# Patient Record
Sex: MALE | Race: WHITE | NOT HISPANIC OR LATINO | Employment: OTHER | URBAN - METROPOLITAN AREA
[De-identification: names, ages, dates, MRNs, and addresses within clinical notes are randomized per-mention and may not be internally consistent; named-entity substitution may affect disease eponyms.]

---

## 2017-03-31 ENCOUNTER — HOSPITAL ENCOUNTER (OUTPATIENT)
Dept: PREADMISSION TESTING | Facility: HOSPITAL | Age: 67
Discharge: HOME | End: 2017-03-31
Attending: ORTHOPAEDIC SURGERY | Admitting: ORTHOPAEDIC SURGERY

## 2017-03-31 LAB
ANION GAP SERPL CALC-SCNC: 14.9 MMOL/L (ref 10–20)
BASOPHILS # BLD AUTO: 0.1 10*3/UL (ref 0–0.2)
BASOPHILS NFR BLD AUTO: 1 % (ref 0–2)
BUN SERPL-MCNC: 19 MG/DL (ref 8–20)
BUN/CREAT SERPL: 17.3 (ref 6.2–20.3)
CALCIUM SERPL-MCNC: 9.6 MG/DL (ref 8.9–10.3)
CHLORIDE SERPL-SCNC: 106 MMOL/L (ref 101–111)
CONV CO2: 25 MMOL/L (ref 22–32)
CREAT UR-MCNC: 1.1 MG/DL (ref 0.7–1.2)
DIFFERENTIAL METHOD BLD: (no result)
EOSINOPHIL # BLD AUTO: 0.1 10*3/UL (ref 0–0.3)
EOSINOPHIL # BLD AUTO: 1 % (ref 0–3)
ERYTHROCYTE [DISTWIDTH] IN BLOOD BY AUTOMATED COUNT: 13 % (ref 11.5–14.5)
GLUCOSE SERPL-MCNC: 95 MG/DL (ref 65–99)
HCT VFR BLD AUTO: 39.7 % (ref 40–54)
HGB BLD-MCNC: 13.7 G/DL (ref 14–18)
LYMPHOCYTES # BLD AUTO: 1.5 10*3/UL (ref 0.8–4.8)
LYMPHOCYTES NFR BLD AUTO: 23 % (ref 18–42)
MCH RBC QN AUTO: 32.5 PG (ref 26–32)
MCHC RBC AUTO-ENTMCNC: 34.6 G/DL (ref 32–36)
MCV RBC AUTO: 94.1 FL (ref 80–94)
MONOCYTES # BLD AUTO: 0.4 10*3/UL (ref 0.1–1.3)
MONOCYTES NFR BLD AUTO: 6 % (ref 2–11)
NEUTROPHILS # BLD AUTO: 4.6 10*3/UL (ref 2.3–8.6)
NEUTROPHILS NFR BLD AUTO: 69 % (ref 50–75)
NRBC BLD AUTO-RTO: 0 /100{WBCS}
NRBC/RBC NFR BLD MANUAL: 0 10*3/UL
PLATELET # BLD AUTO: 161 10*3/UL (ref 150–450)
PMV BLD AUTO: 8 FL (ref 7.4–10.4)
POTASSIUM SERPL-SCNC: 4.9 MMOL/L (ref 3.6–5.1)
RBC # BLD AUTO: 4.22 10*6/UL (ref 4.6–6)
SODIUM SERPL-SCNC: 141 MMOL/L (ref 136–144)
WBC # BLD AUTO: 6.6 10*3/UL (ref 4.5–11.5)

## 2017-05-04 ENCOUNTER — HOSPITAL ENCOUNTER (OUTPATIENT)
Dept: PREADMISSION TESTING | Facility: HOSPITAL | Age: 67
Discharge: HOME | End: 2017-05-04
Attending: ORTHOPAEDIC SURGERY | Admitting: ORTHOPAEDIC SURGERY

## 2017-05-04 LAB
ANION GAP SERPL CALC-SCNC: 13.3 MMOL/L (ref 10–20)
BASOPHILS # BLD AUTO: 0 10*3/UL (ref 0–0.2)
BASOPHILS NFR BLD AUTO: 1 % (ref 0–2)
BUN SERPL-MCNC: 22 MG/DL (ref 8–20)
BUN/CREAT SERPL: 20 (ref 6.2–20.3)
CALCIUM SERPL-MCNC: 9.5 MG/DL (ref 8.9–10.3)
CHLORIDE SERPL-SCNC: 108 MMOL/L (ref 101–111)
CONV CO2: 23 MMOL/L (ref 22–32)
CREAT UR-MCNC: 1.1 MG/DL (ref 0.7–1.2)
DIFFERENTIAL METHOD BLD: (no result)
EOSINOPHIL # BLD AUTO: 0.1 10*3/UL (ref 0–0.3)
EOSINOPHIL # BLD AUTO: 2 % (ref 0–3)
ERYTHROCYTE [DISTWIDTH] IN BLOOD BY AUTOMATED COUNT: 12.8 % (ref 11.5–14.5)
GLUCOSE SERPL-MCNC: 96 MG/DL (ref 65–99)
HCT VFR BLD AUTO: 40.2 % (ref 40–54)
HGB BLD-MCNC: 13.7 G/DL (ref 14–18)
LYMPHOCYTES # BLD AUTO: 1.6 10*3/UL (ref 0.8–4.8)
LYMPHOCYTES NFR BLD AUTO: 27 % (ref 18–42)
MCH RBC QN AUTO: 32 PG (ref 26–32)
MCHC RBC AUTO-ENTMCNC: 34 G/DL (ref 32–36)
MCV RBC AUTO: 94 FL (ref 80–94)
MONOCYTES # BLD AUTO: 0.5 10*3/UL (ref 0.1–1.3)
MONOCYTES NFR BLD AUTO: 8 % (ref 2–11)
NEUTROPHILS # BLD AUTO: 3.7 10*3/UL (ref 2.3–8.6)
NEUTROPHILS NFR BLD AUTO: 62 % (ref 50–75)
NRBC BLD AUTO-RTO: 0 /100{WBCS}
NRBC/RBC NFR BLD MANUAL: 0 10*3/UL
PLATELET # BLD AUTO: 185 10*3/UL (ref 150–450)
PMV BLD AUTO: 7.7 FL (ref 7.4–10.4)
POTASSIUM SERPL-SCNC: 4.3 MMOL/L (ref 3.6–5.1)
RBC # BLD AUTO: 4.28 10*6/UL (ref 4.6–6)
SODIUM SERPL-SCNC: 140 MMOL/L (ref 136–144)
WBC # BLD AUTO: 5.9 10*3/UL (ref 4.5–11.5)

## 2017-05-12 ENCOUNTER — HOSPITAL ENCOUNTER (OUTPATIENT)
Dept: OTHER | Facility: HOSPITAL | Age: 67
Setting detail: SPECIMEN
Discharge: HOME | End: 2017-05-12
Attending: ORTHOPAEDIC SURGERY | Admitting: ORTHOPAEDIC SURGERY

## 2017-06-19 ENCOUNTER — HOSPITAL ENCOUNTER (OUTPATIENT)
Dept: FAMILY MEDICINE CLINIC | Facility: CLINIC | Age: 67
Setting detail: SPECIMEN
Discharge: HOME | End: 2017-06-19
Attending: FAMILY MEDICINE | Admitting: FAMILY MEDICINE

## 2017-07-03 ENCOUNTER — HOSPITAL ENCOUNTER (OUTPATIENT)
Dept: LAB | Facility: HOSPITAL | Age: 67
Discharge: HOME | End: 2017-07-03
Attending: FAMILY MEDICINE | Admitting: FAMILY MEDICINE

## 2017-07-03 LAB
CHOLEST SERPL-MCNC: 212 MG/DL
CHOLEST/HDLC SERPL: 3.9 {RATIO}
CONV LDL CHOLESTEROL DIRECT: 134 MG/DL (ref 0–100)
HDLC SERPL-MCNC: 54 MG/DL
LDLC/HDLC SERPL: 2.5 {RATIO}
LIPID INTERPRETATION: ABNORMAL
TRIGL SERPL-MCNC: 97 MG/DL
TSH SERPL-ACNC: 0.93 UIU/ML (ref 0.34–5.6)
VLDLC SERPL CALC-MCNC: 23.8 MG/DL

## 2017-10-18 ENCOUNTER — HOSPITAL ENCOUNTER (OUTPATIENT)
Dept: MRI IMAGING | Facility: HOSPITAL | Age: 67
Discharge: HOME | End: 2017-10-18
Attending: ORTHOPAEDIC SURGERY | Admitting: ORTHOPAEDIC SURGERY

## 2018-06-25 ENCOUNTER — HOSPITAL ENCOUNTER (OUTPATIENT)
Dept: FAMILY MEDICINE CLINIC | Facility: CLINIC | Age: 68
Setting detail: SPECIMEN
Discharge: HOME | End: 2018-06-25
Attending: FAMILY MEDICINE | Admitting: FAMILY MEDICINE

## 2018-06-25 LAB
CHOLEST SERPL-MCNC: 261 MG/DL
CHOLEST/HDLC SERPL: 4 {RATIO}
CONV LDL CHOLESTEROL DIRECT: 173 MG/DL (ref 0–100)
HDLC SERPL-MCNC: 65 MG/DL
LDLC/HDLC SERPL: 2.7 {RATIO}
LIPID INTERPRETATION: ABNORMAL
TRIGL SERPL-MCNC: 68 MG/DL
VLDLC SERPL CALC-MCNC: 22.8 MG/DL

## 2018-07-26 ENCOUNTER — HOSPITAL ENCOUNTER (OUTPATIENT)
Dept: OTHER | Facility: HOSPITAL | Age: 68
Discharge: HOME | End: 2018-07-26
Attending: ORTHOPAEDIC SURGERY | Admitting: ORTHOPAEDIC SURGERY

## 2018-07-26 LAB
ANION GAP SERPL CALC-SCNC: 9.1 MMOL/L (ref 10–20)
BASOPHILS # BLD AUTO: 0.1 10*3/UL (ref 0–0.2)
BASOPHILS NFR BLD AUTO: 1 % (ref 0–2)
BUN SERPL-MCNC: 15 MG/DL (ref 8–20)
BUN/CREAT SERPL: 12.5 (ref 6.2–20.3)
CALCIUM SERPL-MCNC: 9.5 MG/DL (ref 8.9–10.3)
CHLORIDE SERPL-SCNC: 107 MMOL/L (ref 101–111)
CONV CO2: 28 MMOL/L (ref 22–32)
CREAT UR-MCNC: 1.2 MG/DL (ref 0.7–1.2)
DIFFERENTIAL METHOD BLD: (no result)
EOSINOPHIL # BLD AUTO: 0.1 10*3/UL (ref 0–0.3)
EOSINOPHIL # BLD AUTO: 3 % (ref 0–3)
ERYTHROCYTE [DISTWIDTH] IN BLOOD BY AUTOMATED COUNT: 12.8 % (ref 11.5–14.5)
GLUCOSE SERPL-MCNC: 90 MG/DL (ref 65–99)
HCT VFR BLD AUTO: 40 % (ref 40–54)
HGB BLD-MCNC: 13.6 G/DL (ref 14–18)
LYMPHOCYTES # BLD AUTO: 1.3 10*3/UL (ref 0.8–4.8)
LYMPHOCYTES NFR BLD AUTO: 25 % (ref 18–42)
MCH RBC QN AUTO: 32.1 PG (ref 26–32)
MCHC RBC AUTO-ENTMCNC: 34.1 G/DL (ref 32–36)
MCV RBC AUTO: 94 FL (ref 80–94)
MONOCYTES # BLD AUTO: 0.4 10*3/UL (ref 0.1–1.3)
MONOCYTES NFR BLD AUTO: 8 % (ref 2–11)
NEUTROPHILS # BLD AUTO: 3.2 10*3/UL (ref 2.3–8.6)
NEUTROPHILS NFR BLD AUTO: 63 % (ref 50–75)
NRBC BLD AUTO-RTO: 0 /100{WBCS}
NRBC/RBC NFR BLD MANUAL: 0 10*3/UL
PLATELET # BLD AUTO: 201 10*3/UL (ref 150–450)
PMV BLD AUTO: 7.4 FL (ref 7.4–10.4)
POTASSIUM SERPL-SCNC: 4.1 MMOL/L (ref 3.6–5.1)
RBC # BLD AUTO: 4.26 10*6/UL (ref 4.6–6)
SODIUM SERPL-SCNC: 140 MMOL/L (ref 136–144)
WBC # BLD AUTO: 5.1 10*3/UL (ref 4.5–11.5)

## 2018-12-27 ENCOUNTER — HOSPITAL ENCOUNTER (OUTPATIENT)
Dept: MRI IMAGING | Facility: HOSPITAL | Age: 68
Discharge: HOME | End: 2018-12-27
Attending: NEUROLOGICAL SURGERY | Admitting: NEUROLOGICAL SURGERY

## 2019-06-10 ENCOUNTER — HOSPITAL ENCOUNTER (OUTPATIENT)
Dept: FAMILY MEDICINE CLINIC | Facility: CLINIC | Age: 69
Setting detail: SPECIMEN
Discharge: HOME | End: 2019-06-10
Attending: FAMILY MEDICINE | Admitting: FAMILY MEDICINE

## 2019-06-10 ENCOUNTER — CONVERSION ENCOUNTER (OUTPATIENT)
Dept: FAMILY MEDICINE CLINIC | Facility: CLINIC | Age: 69
End: 2019-06-10

## 2019-06-10 LAB
CHOLEST SERPL-MCNC: 218 MG/DL
CHOLEST/HDLC SERPL: 3.5 {RATIO}
CONV LDL CHOLESTEROL DIRECT: 147 MG/DL (ref 0–100)
HDLC SERPL-MCNC: 62 MG/DL
LDLC/HDLC SERPL: 2.4 {RATIO}
LIPID INTERPRETATION: ABNORMAL
TRIGL SERPL-MCNC: 58 MG/DL
TSH SERPL-ACNC: 1.21 UIU/ML (ref 0.34–5.6)
VLDLC SERPL CALC-MCNC: 8.9 MG/DL

## 2019-06-12 VITALS
OXYGEN SATURATION: 96 % | BODY MASS INDEX: 31.1 KG/M2 | RESPIRATION RATE: 16 BRPM | WEIGHT: 229.6 LBS | HEART RATE: 66 BPM | SYSTOLIC BLOOD PRESSURE: 128 MMHG | DIASTOLIC BLOOD PRESSURE: 79 MMHG | HEIGHT: 72 IN
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
## 2019-06-24 PROBLEM — M25.519 ACROMIOCLAVICULAR JOINT PAIN: Status: ACTIVE | Noted: 2018-06-25

## 2019-06-24 PROBLEM — M65.819 OTHER SYNOVITIS AND TENOSYNOVITIS, UNSPECIFIED SHOULDER: Status: ACTIVE | Noted: 2017-12-18

## 2019-06-24 PROBLEM — D23.9 DERMATOFIBROMA: Status: ACTIVE | Noted: 2019-06-10

## 2019-06-24 PROBLEM — M54.12 CERVICAL RADICULITIS: Status: ACTIVE | Noted: 2018-12-17

## 2019-06-24 PROBLEM — M75.01 ADHESIVE CAPSULITIS OF RIGHT SHOULDER: Status: ACTIVE | Noted: 2018-04-16

## 2019-06-24 RX ORDER — SILDENAFIL 100 MG/1
TABLET, FILM COATED ORAL
Refills: 5 | COMMUNITY
Start: 2019-03-18 | End: 2020-02-04

## 2019-06-24 RX ORDER — BUDESONIDE AND FORMOTEROL FUMARATE DIHYDRATE 160; 4.5 UG/1; UG/1
2 AEROSOL RESPIRATORY (INHALATION) 2 TIMES DAILY
Refills: 3 | COMMUNITY
Start: 2019-05-03 | End: 2019-07-18 | Stop reason: SDUPTHER

## 2019-06-24 RX ORDER — MONTELUKAST SODIUM 10 MG/1
10 TABLET ORAL DAILY
Refills: 2 | COMMUNITY
Start: 2019-05-03 | End: 2019-11-30 | Stop reason: SDUPTHER

## 2019-06-24 RX ORDER — NAPROXEN 500 MG/1
TABLET ORAL EVERY 12 HOURS
COMMUNITY
Start: 2018-06-25 | End: 2019-09-25 | Stop reason: SDUPTHER

## 2019-06-24 RX ORDER — ALBUTEROL SULFATE 90 UG/1
AEROSOL, METERED RESPIRATORY (INHALATION)
COMMUNITY
Start: 2012-06-12 | End: 2021-03-12 | Stop reason: SDUPTHER

## 2019-06-25 ENCOUNTER — OFFICE VISIT (OUTPATIENT)
Dept: SURGERY | Facility: CLINIC | Age: 69
End: 2019-06-25

## 2019-06-25 VITALS
OXYGEN SATURATION: 98 % | DIASTOLIC BLOOD PRESSURE: 82 MMHG | SYSTOLIC BLOOD PRESSURE: 130 MMHG | HEART RATE: 66 BPM | TEMPERATURE: 97.8 F | HEIGHT: 72 IN | BODY MASS INDEX: 30.23 KG/M2 | WEIGHT: 223.2 LBS

## 2019-06-25 DIAGNOSIS — L98.9 DISORDER OF SKIN OR SUBCUTANEOUS TISSUE: Primary | ICD-10-CM

## 2019-06-25 PROCEDURE — 99202 OFFICE O/P NEW SF 15 MIN: CPT | Performed by: SURGERY

## 2019-06-25 RX ORDER — SODIUM CHLORIDE 9 MG/ML
100 INJECTION, SOLUTION INTRAVENOUS CONTINUOUS
Status: CANCELLED | OUTPATIENT
Start: 2019-06-25

## 2019-06-25 NOTE — PROGRESS NOTES
"Subjective   Jim Malik is a 69 y.o. male.   Chief Complaint   Patient presents with   • Skin Lesion     scalp and left lower extremity     /82 (BP Location: Right arm, Patient Position: Sitting, Cuff Size: Small Adult)   Pulse 66   Temp 97.8 °F (36.6 °C) (Oral)   Ht 182.9 cm (72\")   Wt 101 kg (223 lb 3.2 oz)   SpO2 98%   BMI 30.27 kg/m²      HISTORY OF PRESENT ILLNESS:  69-year-old gentleman who has been referred to me from his primary care provider to evaluate and remove several skin lesions.  He said that on his left lower extremity he had a 2-week period where a new mole developed it turned red gradually improved but now there is a new skin lesion where there was none before.  He says this is never occurred previously.  He has no known history of skin cancer.  He was also found to have 2 spots on his posterior scalp which were suspicious for skin cancer.  Because of these reasons he has been referred to me.  He denies any symptoms at this location.      Outpatient Encounter Medications as of 6/25/2019   Medication Sig Dispense Refill   • montelukast (SINGULAIR) 10 MG tablet Take 10 mg by mouth Daily.  2   • SYMBICORT 160-4.5 MCG/ACT inhaler Inhale 2 puffs 2 (Two) Times a Day.  3   • albuterol sulfate HFA (PROVENTIL HFA) 108 (90 Base) MCG/ACT inhaler PROVENTIL  (90 Base) MCG/ACT AERS     • naproxen (NAPROSYN) 500 MG tablet Every 12 (Twelve) Hours.     • sildenafil (VIAGRA) 100 MG tablet TAKE 1/2 - 1 TABLET BY MOUTH 1 HOUR PRIOR TO INTERCOURSE  5     No facility-administered encounter medications on file as of 6/25/2019.          The following portions of the patient's history were reviewed and updated as appropriate: allergies, current medications, past family history, past medical history, past social history, past surgical history and problem list.    A complete review of systems has been obtained is positive for changes in moles but otherwise negative  Objective   Physical Exam "   Constitutional: He is oriented to person, place, and time. He appears well-developed and well-nourished.   HENT:   Head: Normocephalic and atraumatic.   Eyes: Conjunctivae and EOM are normal.   Neck: No tracheal deviation present.   Cardiovascular: Normal rate and regular rhythm.   Pulmonary/Chest: Effort normal and breath sounds normal. No stridor.   Abdominal: Soft. He exhibits no distension. There is no tenderness. There is no rebound and no guarding.   Musculoskeletal: He exhibits no edema or tenderness.   Lymphadenopathy:     He has no cervical adenopathy.   Neurological: He is oriented to person, place, and time. No cranial nerve deficit.   Skin: Skin is warm and dry.   On the superior posterior scalp there are 2 skin lesions approximately 5 mm in greatest dimension which appear slightly ulcerated.  On the left lower extremity there is about a 7 mm hyperpigmented skin lesion which he says is new and started out as a red raised spot.   Psychiatric: He has a normal mood and affect. His behavior is normal.         Assessment/Plan   Jim was seen today for skin lesion.    Diagnoses and all orders for this visit:    Disorder of skin or subcutaneous tissue  -     Case Request; Standing  -     sodium chloride 0.9 % infusion  -     ceFAZolin (ANCEF) 2 g in sodium chloride 0.9 % 100 mL IVPB  -     Case Request    Other orders  -     Follow Anesthesia Guidelines / Standing Orders; Future  -     Provide NPO Instructions to Patient; Future  -     Follow Anesthesia Guidelines / Standing Orders; Standing  -     Verify NPO Status; Standing  -     Obtain Informed Consent; Standing  -     Clip Operative Site; Standing  -     Have Patient Void Prior to Procedure; Standing  -     Instructions on Coughing, Deep Breathing & Incentive Spirometry; Standing  -     Oxygen Therapy- Nasal Cannula; Titrate for SPO2: 90% - 95%; Standing  -     Notify Physician - Standard; Standing      Patient referred for evaluation of several skin  lesions.  He was trying to see dermatology for removal but he could not see them for 4 months.  I discussed with him the risks and benefits of surgical removal in the operating room including the 2 posterior scalp lesions in the left lower extremity lesion. after this discussion he does understand and wishes to proceed.    Stephen Zamora MD  6/25/2019  8:43 AM

## 2019-06-26 ENCOUNTER — APPOINTMENT (OUTPATIENT)
Dept: PREADMISSION TESTING | Facility: HOSPITAL | Age: 69
End: 2019-06-26

## 2019-06-26 VITALS
BODY MASS INDEX: 31.48 KG/M2 | HEART RATE: 87 BPM | DIASTOLIC BLOOD PRESSURE: 77 MMHG | HEIGHT: 72 IN | OXYGEN SATURATION: 95 % | WEIGHT: 232.38 LBS | SYSTOLIC BLOOD PRESSURE: 120 MMHG

## 2019-06-26 LAB
BASOPHILS # BLD AUTO: 0.1 10*3/MM3 (ref 0–0.2)
BASOPHILS NFR BLD AUTO: 1 % (ref 0–1.5)
DEPRECATED RDW RBC AUTO: 42.9 FL (ref 37–54)
EOSINOPHIL # BLD AUTO: 0.1 10*3/MM3 (ref 0–0.4)
EOSINOPHIL NFR BLD AUTO: 2.1 % (ref 0.3–6.2)
ERYTHROCYTE [DISTWIDTH] IN BLOOD BY AUTOMATED COUNT: 12.8 % (ref 12.3–15.4)
HCT VFR BLD AUTO: 40.5 % (ref 37.5–51)
HGB BLD-MCNC: 13.8 G/DL (ref 13–17.7)
LYMPHOCYTES # BLD AUTO: 1.7 10*3/MM3 (ref 0.7–3.1)
LYMPHOCYTES NFR BLD AUTO: 29.8 % (ref 19.6–45.3)
MCH RBC QN AUTO: 32.8 PG (ref 26.6–33)
MCHC RBC AUTO-ENTMCNC: 34.1 G/DL (ref 31.5–35.7)
MCV RBC AUTO: 96.3 FL (ref 79–97)
MONOCYTES # BLD AUTO: 0.5 10*3/MM3 (ref 0.1–0.9)
MONOCYTES NFR BLD AUTO: 8.8 % (ref 5–12)
NEUTROPHILS # BLD AUTO: 3.2 10*3/MM3 (ref 1.7–7)
NEUTROPHILS NFR BLD AUTO: 58.3 % (ref 42.7–76)
NRBC BLD AUTO-RTO: 0.1 /100 WBC (ref 0–0.2)
PLATELET # BLD AUTO: 196 10*3/MM3 (ref 140–450)
PMV BLD AUTO: 7.8 FL (ref 6–12)
RBC # BLD AUTO: 4.21 10*6/MM3 (ref 4.14–5.8)
WBC NRBC COR # BLD: 5.6 10*3/MM3 (ref 3.4–10.8)

## 2019-06-26 PROCEDURE — 93010 ELECTROCARDIOGRAM REPORT: CPT | Performed by: INTERNAL MEDICINE

## 2019-06-26 PROCEDURE — 85025 COMPLETE CBC W/AUTO DIFF WBC: CPT | Performed by: SURGERY

## 2019-06-26 PROCEDURE — 93005 ELECTROCARDIOGRAM TRACING: CPT

## 2019-06-26 PROCEDURE — 36415 COLL VENOUS BLD VENIPUNCTURE: CPT

## 2019-07-08 ENCOUNTER — ANESTHESIA (OUTPATIENT)
Dept: PERIOP | Facility: HOSPITAL | Age: 69
End: 2019-07-08

## 2019-07-08 ENCOUNTER — ANESTHESIA EVENT (OUTPATIENT)
Dept: PERIOP | Facility: HOSPITAL | Age: 69
End: 2019-07-08

## 2019-07-08 ENCOUNTER — HOSPITAL ENCOUNTER (OUTPATIENT)
Facility: HOSPITAL | Age: 69
Setting detail: HOSPITAL OUTPATIENT SURGERY
Discharge: HOME | End: 2019-07-08
Attending: SURGERY | Admitting: SURGERY

## 2019-07-08 VITALS
BODY MASS INDEX: 30.52 KG/M2 | DIASTOLIC BLOOD PRESSURE: 89 MMHG | HEART RATE: 57 BPM | WEIGHT: 225.31 LBS | OXYGEN SATURATION: 100 % | RESPIRATION RATE: 18 BRPM | SYSTOLIC BLOOD PRESSURE: 139 MMHG | HEIGHT: 72 IN | TEMPERATURE: 97.5 F

## 2019-07-08 DIAGNOSIS — L98.9 DISORDER OF SKIN OR SUBCUTANEOUS TISSUE: ICD-10-CM

## 2019-07-08 PROCEDURE — 11402 EXC TR-EXT B9+MARG 1.1-2 CM: CPT | Performed by: SURGERY

## 2019-07-08 PROCEDURE — 25010000002 PROPOFOL 200 MG/20ML EMULSION: Performed by: ANESTHESIOLOGY

## 2019-07-08 PROCEDURE — 11421 EXC H-F-NK-SP B9+MARG 0.6-1: CPT | Performed by: SURGERY

## 2019-07-08 PROCEDURE — 25010000002 KETOROLAC TROMETHAMINE PER 15 MG: Performed by: ANESTHESIOLOGY

## 2019-07-08 PROCEDURE — 88305 TISSUE EXAM BY PATHOLOGIST: CPT | Performed by: SURGERY

## 2019-07-08 PROCEDURE — 94799 UNLISTED PULMONARY SVC/PX: CPT

## 2019-07-08 PROCEDURE — 25010000002 ONDANSETRON PER 1 MG: Performed by: ANESTHESIOLOGY

## 2019-07-08 PROCEDURE — 11621 EXC S/N/H/F/G MAL+MRG 0.6-1: CPT | Performed by: SURGERY

## 2019-07-08 PROCEDURE — 25010000002 PROPOFOL 500 MG/50ML EMULSION: Performed by: ANESTHESIOLOGY

## 2019-07-08 PROCEDURE — 25010000002 FENTANYL CITRATE (PF) 100 MCG/2ML SOLUTION: Performed by: ANESTHESIOLOGY

## 2019-07-08 RX ORDER — HYDROMORPHONE HCL 110MG/55ML
0.5 PATIENT CONTROLLED ANALGESIA SYRINGE INTRAVENOUS
Status: DISCONTINUED | OUTPATIENT
Start: 2019-07-08 | End: 2019-07-08 | Stop reason: HOSPADM

## 2019-07-08 RX ORDER — ACETAMINOPHEN 325 MG/1
650 TABLET ORAL ONCE AS NEEDED
Status: DISCONTINUED | OUTPATIENT
Start: 2019-07-08 | End: 2019-07-08 | Stop reason: HOSPADM

## 2019-07-08 RX ORDER — ONDANSETRON 2 MG/ML
4 INJECTION INTRAMUSCULAR; INTRAVENOUS ONCE AS NEEDED
Status: DISCONTINUED | OUTPATIENT
Start: 2019-07-08 | End: 2019-07-08 | Stop reason: HOSPADM

## 2019-07-08 RX ORDER — MIDAZOLAM HYDROCHLORIDE 1 MG/ML
2 INJECTION INTRAMUSCULAR; INTRAVENOUS
Status: DISCONTINUED | OUTPATIENT
Start: 2019-07-08 | End: 2019-07-08 | Stop reason: HOSPADM

## 2019-07-08 RX ORDER — ACETAMINOPHEN 650 MG/1
650 SUPPOSITORY RECTAL ONCE AS NEEDED
Status: DISCONTINUED | OUTPATIENT
Start: 2019-07-08 | End: 2019-07-08 | Stop reason: HOSPADM

## 2019-07-08 RX ORDER — EPHEDRINE SULFATE 50 MG/ML
5 INJECTION, SOLUTION INTRAVENOUS ONCE AS NEEDED
Status: DISCONTINUED | OUTPATIENT
Start: 2019-07-08 | End: 2019-07-08 | Stop reason: HOSPADM

## 2019-07-08 RX ORDER — LIDOCAINE HYDROCHLORIDE 10 MG/ML
0.5 INJECTION, SOLUTION EPIDURAL; INFILTRATION; INTRACAUDAL; PERINEURAL ONCE AS NEEDED
Status: DISCONTINUED | OUTPATIENT
Start: 2019-07-08 | End: 2019-07-08 | Stop reason: HOSPADM

## 2019-07-08 RX ORDER — NALOXONE HCL 0.4 MG/ML
0.2 VIAL (ML) INJECTION AS NEEDED
Status: DISCONTINUED | OUTPATIENT
Start: 2019-07-08 | End: 2019-07-08 | Stop reason: HOSPADM

## 2019-07-08 RX ORDER — HYDRALAZINE HYDROCHLORIDE 20 MG/ML
5 INJECTION INTRAMUSCULAR; INTRAVENOUS
Status: DISCONTINUED | OUTPATIENT
Start: 2019-07-08 | End: 2019-07-08 | Stop reason: HOSPADM

## 2019-07-08 RX ORDER — FENTANYL CITRATE 50 UG/ML
50 INJECTION, SOLUTION INTRAMUSCULAR; INTRAVENOUS
Status: DISCONTINUED | OUTPATIENT
Start: 2019-07-08 | End: 2019-07-08 | Stop reason: HOSPADM

## 2019-07-08 RX ORDER — PROPOFOL 10 MG/ML
INJECTION, EMULSION INTRAVENOUS CONTINUOUS PRN
Status: DISCONTINUED | OUTPATIENT
Start: 2019-07-08 | End: 2019-07-08 | Stop reason: SURG

## 2019-07-08 RX ORDER — DIPHENHYDRAMINE HCL 25 MG
25 TABLET ORAL
Status: DISCONTINUED | OUTPATIENT
Start: 2019-07-08 | End: 2019-07-08 | Stop reason: HOSPADM

## 2019-07-08 RX ORDER — SODIUM CHLORIDE 9 MG/ML
100 INJECTION, SOLUTION INTRAVENOUS CONTINUOUS
Status: DISCONTINUED | OUTPATIENT
Start: 2019-07-08 | End: 2019-07-08 | Stop reason: HOSPADM

## 2019-07-08 RX ORDER — LABETALOL HYDROCHLORIDE 5 MG/ML
5 INJECTION, SOLUTION INTRAVENOUS
Status: DISCONTINUED | OUTPATIENT
Start: 2019-07-08 | End: 2019-07-08 | Stop reason: HOSPADM

## 2019-07-08 RX ORDER — SODIUM CHLORIDE, SODIUM LACTATE, POTASSIUM CHLORIDE, CALCIUM CHLORIDE 600; 310; 30; 20 MG/100ML; MG/100ML; MG/100ML; MG/100ML
9 INJECTION, SOLUTION INTRAVENOUS CONTINUOUS
Status: DISCONTINUED | OUTPATIENT
Start: 2019-07-08 | End: 2019-07-08 | Stop reason: HOSPADM

## 2019-07-08 RX ORDER — SODIUM CHLORIDE 0.9 % (FLUSH) 0.9 %
1-10 SYRINGE (ML) INJECTION AS NEEDED
Status: DISCONTINUED | OUTPATIENT
Start: 2019-07-08 | End: 2019-07-08 | Stop reason: HOSPADM

## 2019-07-08 RX ORDER — PROMETHAZINE HYDROCHLORIDE 25 MG/1
25 TABLET ORAL ONCE AS NEEDED
Status: DISCONTINUED | OUTPATIENT
Start: 2019-07-08 | End: 2019-07-08 | Stop reason: HOSPADM

## 2019-07-08 RX ORDER — FLUMAZENIL 0.1 MG/ML
0.2 INJECTION INTRAVENOUS AS NEEDED
Status: DISCONTINUED | OUTPATIENT
Start: 2019-07-08 | End: 2019-07-08 | Stop reason: HOSPADM

## 2019-07-08 RX ORDER — FENTANYL CITRATE 50 UG/ML
INJECTION, SOLUTION INTRAMUSCULAR; INTRAVENOUS AS NEEDED
Status: DISCONTINUED | OUTPATIENT
Start: 2019-07-08 | End: 2019-07-08 | Stop reason: SURG

## 2019-07-08 RX ORDER — KETOROLAC TROMETHAMINE 15 MG/ML
INJECTION, SOLUTION INTRAMUSCULAR; INTRAVENOUS AS NEEDED
Status: DISCONTINUED | OUTPATIENT
Start: 2019-07-08 | End: 2019-07-08 | Stop reason: SURG

## 2019-07-08 RX ORDER — PROMETHAZINE HYDROCHLORIDE 25 MG/ML
12.5 INJECTION, SOLUTION INTRAMUSCULAR; INTRAVENOUS ONCE AS NEEDED
Status: DISCONTINUED | OUTPATIENT
Start: 2019-07-08 | End: 2019-07-08 | Stop reason: HOSPADM

## 2019-07-08 RX ORDER — PROMETHAZINE HYDROCHLORIDE 25 MG/1
25 SUPPOSITORY RECTAL ONCE AS NEEDED
Status: DISCONTINUED | OUTPATIENT
Start: 2019-07-08 | End: 2019-07-08 | Stop reason: HOSPADM

## 2019-07-08 RX ORDER — PROPOFOL 10 MG/ML
INJECTION, EMULSION INTRAVENOUS AS NEEDED
Status: DISCONTINUED | OUTPATIENT
Start: 2019-07-08 | End: 2019-07-08 | Stop reason: SURG

## 2019-07-08 RX ORDER — LIDOCAINE HYDROCHLORIDE AND EPINEPHRINE 10; 10 MG/ML; UG/ML
INJECTION, SOLUTION INFILTRATION; PERINEURAL AS NEEDED
Status: DISCONTINUED | OUTPATIENT
Start: 2019-07-08 | End: 2019-07-08 | Stop reason: HOSPADM

## 2019-07-08 RX ORDER — DIPHENHYDRAMINE HYDROCHLORIDE 50 MG/ML
12.5 INJECTION INTRAMUSCULAR; INTRAVENOUS
Status: DISCONTINUED | OUTPATIENT
Start: 2019-07-08 | End: 2019-07-08 | Stop reason: HOSPADM

## 2019-07-08 RX ORDER — ONDANSETRON 2 MG/ML
INJECTION INTRAMUSCULAR; INTRAVENOUS AS NEEDED
Status: DISCONTINUED | OUTPATIENT
Start: 2019-07-08 | End: 2019-07-08 | Stop reason: SURG

## 2019-07-08 RX ORDER — HYDROCODONE BITARTRATE AND ACETAMINOPHEN 5; 325 MG/1; MG/1
1 TABLET ORAL EVERY 4 HOURS PRN
Qty: 6 TABLET | Refills: 0 | Status: SHIPPED | OUTPATIENT
Start: 2019-07-08 | End: 2019-07-15 | Stop reason: HOSPADM

## 2019-07-08 RX ADMIN — PROPOFOL 50 MG: 10 INJECTION, EMULSION INTRAVENOUS at 11:10

## 2019-07-08 RX ADMIN — CEFAZOLIN SODIUM 2 G: 1 INJECTION, POWDER, FOR SOLUTION INTRAMUSCULAR; INTRAVENOUS at 11:10

## 2019-07-08 RX ADMIN — ONDANSETRON 4 MG: 2 INJECTION INTRAMUSCULAR; INTRAVENOUS at 11:25

## 2019-07-08 RX ADMIN — FENTANYL CITRATE 50 MCG: 50 INJECTION, SOLUTION INTRAMUSCULAR; INTRAVENOUS at 11:10

## 2019-07-08 RX ADMIN — SODIUM CHLORIDE, SODIUM LACTATE, POTASSIUM CHLORIDE, AND CALCIUM CHLORIDE 9 ML/HR: 600; 310; 30; 20 INJECTION, SOLUTION INTRAVENOUS at 09:24

## 2019-07-08 RX ADMIN — FENTANYL CITRATE 25 MCG: 50 INJECTION, SOLUTION INTRAMUSCULAR; INTRAVENOUS at 11:20

## 2019-07-08 RX ADMIN — FENTANYL CITRATE 25 MCG: 50 INJECTION, SOLUTION INTRAMUSCULAR; INTRAVENOUS at 11:15

## 2019-07-08 RX ADMIN — PROPOFOL 50 MCG/KG/MIN: 10 INJECTION, EMULSION INTRAVENOUS at 11:10

## 2019-07-08 RX ADMIN — KETOROLAC TROMETHAMINE 15 MG: 15 INJECTION, SOLUTION INTRAMUSCULAR; INTRAVENOUS at 11:25

## 2019-07-08 NOTE — ANESTHESIA PREPROCEDURE EVALUATION
Anesthesia Evaluation     Patient summary reviewed   NPO Solid Status: > 8 hours  NPO Liquid Status: > 8 hours           Airway   Mallampati: II  TM distance: >3 FB  Neck ROM: full  No difficulty expected  Dental      Pulmonary - normal exam   (+) asthma,   Cardiovascular - normal exam        Neuro/Psych  GI/Hepatic/Renal/Endo    (+) obesity,       Musculoskeletal     Abdominal   (+) obese,    Substance History      OB/GYN          Other        ROS/Med Hx Other: NPO  Recent labs reviewed  EKG NSR                  Anesthesia Plan    ASA 2     MAC     intravenous induction   Anesthetic plan, all risks, benefits, and alternatives have been provided, discussed and informed consent has been obtained with: patient.

## 2019-07-08 NOTE — OP NOTE
Operative Report:    Patient Name:  Jim Malik  YOB: 1950    Date of Surgery:  7/8/2019     Indications: 69-year-old gentleman referred to me for removal of several suspicious skin lesions after evaluation by his primary care provider.  We discussed the risks and benefits of removal he understood and was willing to proceed.    Pre-op Diagnosis:   Disorder of skin or subcutaneous tissue [L98.9]       Post-Op Diagnosis Codes:     * Disorder of skin or subcutaneous tissue [L98.9]    Procedure/CPT® Codes:      Procedure(s):  Excision of left lower extremity atypical nevus total excised diameter 17 mm  Excision of left posterior scalp lesion total excised diameter 9 mm  Excision of central posterior scalp lesion total excised diameter 7 mm    Staff:  Surgeon(s):  Stephen Zamora MD         Anesthesia: Sedation    Estimated Blood Loss: 10    Implants:    Nothing was implanted during the procedure    Specimen:          Specimens     ID Source Type Tests Collected By Collected At Frozen?      A Leg, Left Tissue · TISSUE PATHOLOGY EXAM   Stephen Zamora MD 7/8/19 1117 No     Description: skin lesion left lower leg    B Scalp Skin · TISSUE PATHOLOGY EXAM   Stephen Zamora MD 7/8/19 1129 No     Description: left posterior scalp lesion    C Scalp Skin · TISSUE PATHOLOGY EXAM   Stephen Zamora MD 7/8/19 1130 No     Description: posterior center scalp lesion              Findings: Left lower extremity atypical nevus 7 mm greatest dimension 17 mm total excised diameter.  Left posterior scalp lesion 5 mm greatest diameter 9 mm total excised diameter.  Central posterior scalp lesion 3 mm greatest dimension 7 mm total excised diameter    Complications: Patient reached up under the drapes and contaminated the skin during the operation.  the skin was re-cleansed using a chlorhexidine scrub.     Description of Procedure: Patient was taken to the operating room and placed on the table in the right lateral decubitus  position under monitored sedation.  His left lower extremity and posterior scalp were prepped and draped normal sterile fashion.  A timeout was performed identifying the correct patient procedure site of operation.  I began the operation by marking a border around the left lower extremity skin lesion to achieve a 5 mm margin.  The skin was infiltrated using 1% lidocaine with epinephrine and a elliptical incision was made with a 15 blade scalpel.  The specimen was removed and sent to pathology for review and the wound was closed using interrupted 3-0 Prolene sutures.  I then turned my attention to the scalp where the left posterior scalp lesion was 5 mm in greatest dimension.  This was infiltrated using 1% lidocaine with epinephrine and the 15 blade scalpel was used to excise this lesion with a 2 mm margin total excised diameter 9 mm.  I then turned my attention to the posterior central scalp where there was a broad area approximately 4 to 5 cm of scaling but no clearly defined skin lesion.  I then proceeded to perform a 7 mm wide incisional biopsy at the area that had the most scaling.  This was previously discussed with him as the plan.  The specimens were sent to pathology for review.   I then closed the skin of the scalp wounds using interrupted 3-0 Prolene sutures with good hemostasis.  Covaderm dressings were placed on the wounds.       Date: 7/8/2019  Time: 11:58 AM

## 2019-07-08 NOTE — ANESTHESIA POSTPROCEDURE EVALUATION
Patient: Jim Malik    Procedure Summary     Date:  07/08/19 Room / Location:  [facility] OR 13 / [facility] MAIN OR    Anesthesia Start:  1102 Anesthesia Stop:  1156    Procedure:  SKIN LESION EXCISION  Posterior scalp x2 and left lower extremity (N/A ) Diagnosis:       Disorder of skin or subcutaneous tissue      (Disorder of skin or subcutaneous tissue [L98.9])    Surgeon:  Stephen Zamora MD Provider:  Clifford Hernandez MD    Anesthesia Type:  MAC ASA Status:  2          Anesthesia Type: MAC  Last vitals  BP   139/89 (07/08/19 1224)   Temp   97.5 °F (36.4 °C) (07/08/19 1224)   Pulse   57 (07/08/19 1224)   Resp   18 (07/08/19 1224)     SpO2   100 % (07/08/19 1224)     Post Anesthesia Care and Evaluation    Patient location during evaluation: PHASE II  Patient participation: complete - patient participated  Level of consciousness: awake  Pain scale: See nurse's notes for pain score.  Pain management: adequate  Airway patency: patent  Anesthetic complications: No anesthetic complications  PONV Status: none  Cardiovascular status: acceptable  Respiratory status: acceptable  Hydration status: acceptable    Comments: Patient seen and examined postoperatively; vital signs stable; SpO2 greater than or equal to 90%; cardiopulmonary status stable; nausea/vomiting adequately controlled; pain adequately controlled; no apparent anesthesia complications; patient discharged from anesthesia care when discharge criteria were met

## 2019-07-09 LAB
LAB AP CASE REPORT: NORMAL
LAB AP DIAGNOSIS COMMENT: NORMAL
PATH REPORT.FINAL DX SPEC: NORMAL
PATH REPORT.GROSS SPEC: NORMAL

## 2019-07-10 ENCOUNTER — PREP FOR SURGERY (OUTPATIENT)
Dept: OTHER | Facility: HOSPITAL | Age: 69
End: 2019-07-10

## 2019-07-10 DIAGNOSIS — C44.41 BASAL CELL CARCINOMA, SCALP/NECK: Primary | ICD-10-CM

## 2019-07-10 RX ORDER — SODIUM CHLORIDE 0.9 % (FLUSH) 0.9 %
3-10 SYRINGE (ML) INJECTION AS NEEDED
Status: CANCELLED | OUTPATIENT
Start: 2019-07-10

## 2019-07-10 RX ORDER — SODIUM CHLORIDE 9 MG/ML
100 INJECTION, SOLUTION INTRAVENOUS CONTINUOUS
Status: CANCELLED | OUTPATIENT
Start: 2019-07-10

## 2019-07-10 RX ORDER — SODIUM CHLORIDE 0.9 % (FLUSH) 0.9 %
3 SYRINGE (ML) INJECTION EVERY 12 HOURS SCHEDULED
Status: CANCELLED | OUTPATIENT
Start: 2019-07-10

## 2019-07-11 PROBLEM — C44.41 BASAL CELL CARCINOMA, SCALP/NECK: Status: ACTIVE | Noted: 2019-07-11

## 2019-07-12 ENCOUNTER — ANESTHESIA EVENT (OUTPATIENT)
Dept: PERIOP | Facility: HOSPITAL | Age: 69
End: 2019-07-12

## 2019-07-12 ENCOUNTER — APPOINTMENT (OUTPATIENT)
Dept: PREADMISSION TESTING | Facility: HOSPITAL | Age: 69
End: 2019-07-12

## 2019-07-15 ENCOUNTER — ANESTHESIA (OUTPATIENT)
Dept: PERIOP | Facility: HOSPITAL | Age: 69
End: 2019-07-15

## 2019-07-15 ENCOUNTER — HOSPITAL ENCOUNTER (OUTPATIENT)
Facility: HOSPITAL | Age: 69
Setting detail: HOSPITAL OUTPATIENT SURGERY
Discharge: HOME | End: 2019-07-15
Attending: SURGERY | Admitting: SURGERY

## 2019-07-15 VITALS
RESPIRATION RATE: 18 BRPM | DIASTOLIC BLOOD PRESSURE: 88 MMHG | OXYGEN SATURATION: 96 % | SYSTOLIC BLOOD PRESSURE: 138 MMHG | HEART RATE: 71 BPM | TEMPERATURE: 97.5 F

## 2019-07-15 DIAGNOSIS — C44.41 BASAL CELL CARCINOMA, SCALP/NECK: ICD-10-CM

## 2019-07-15 PROCEDURE — 11624 EXC S/N/H/F/G MAL+MRG 3.1-4: CPT | Performed by: SURGERY

## 2019-07-15 PROCEDURE — 88331 PATH CONSLTJ SURG 1 BLK 1SPC: CPT | Performed by: SURGERY

## 2019-07-15 PROCEDURE — 25010000002 PROPOFOL 200 MG/20ML EMULSION: Performed by: ANESTHESIOLOGIST ASSISTANT

## 2019-07-15 PROCEDURE — 25010000002 DEXAMETHASONE PER 1 MG: Performed by: ANESTHESIOLOGIST ASSISTANT

## 2019-07-15 PROCEDURE — 88305 TISSUE EXAM BY PATHOLOGIST: CPT | Performed by: SURGERY

## 2019-07-15 PROCEDURE — 25010000002 KETOROLAC TROMETHAMINE PER 15 MG: Performed by: ANESTHESIOLOGIST ASSISTANT

## 2019-07-15 PROCEDURE — 25010000002 FENTANYL CITRATE (PF) 100 MCG/2ML SOLUTION: Performed by: ANESTHESIOLOGIST ASSISTANT

## 2019-07-15 PROCEDURE — 25010000002 ONDANSETRON PER 1 MG: Performed by: ANESTHESIOLOGIST ASSISTANT

## 2019-07-15 RX ORDER — PROPOFOL 10 MG/ML
INJECTION, EMULSION INTRAVENOUS AS NEEDED
Status: DISCONTINUED | OUTPATIENT
Start: 2019-07-15 | End: 2019-07-15 | Stop reason: SURG

## 2019-07-15 RX ORDER — LORAZEPAM 2 MG/ML
1 INJECTION INTRAMUSCULAR
Status: DISCONTINUED | OUTPATIENT
Start: 2019-07-15 | End: 2019-07-15 | Stop reason: HOSPADM

## 2019-07-15 RX ORDER — BUPIVACAINE HYDROCHLORIDE AND EPINEPHRINE 2.5; 5 MG/ML; UG/ML
INJECTION, SOLUTION EPIDURAL; INFILTRATION; INTRACAUDAL; PERINEURAL AS NEEDED
Status: DISCONTINUED | OUTPATIENT
Start: 2019-07-15 | End: 2019-07-15 | Stop reason: HOSPADM

## 2019-07-15 RX ORDER — ONDANSETRON 2 MG/ML
INJECTION INTRAMUSCULAR; INTRAVENOUS AS NEEDED
Status: DISCONTINUED | OUTPATIENT
Start: 2019-07-15 | End: 2019-07-15 | Stop reason: SURG

## 2019-07-15 RX ORDER — HYDROCODONE BITARTRATE AND ACETAMINOPHEN 5; 325 MG/1; MG/1
1 TABLET ORAL ONCE AS NEEDED
Qty: 6 TABLET | Refills: 0 | Status: SHIPPED | OUTPATIENT
Start: 2019-07-15 | End: 2020-06-15

## 2019-07-15 RX ORDER — SODIUM CHLORIDE 0.9 % (FLUSH) 0.9 %
3-10 SYRINGE (ML) INJECTION AS NEEDED
Status: DISCONTINUED | OUTPATIENT
Start: 2019-07-15 | End: 2019-07-15 | Stop reason: HOSPADM

## 2019-07-15 RX ORDER — IPRATROPIUM BROMIDE AND ALBUTEROL SULFATE 2.5; .5 MG/3ML; MG/3ML
3 SOLUTION RESPIRATORY (INHALATION) ONCE AS NEEDED
Status: DISCONTINUED | OUTPATIENT
Start: 2019-07-15 | End: 2019-07-15 | Stop reason: HOSPADM

## 2019-07-15 RX ORDER — PROMETHAZINE HYDROCHLORIDE 25 MG/1
25 SUPPOSITORY RECTAL ONCE AS NEEDED
Status: DISCONTINUED | OUTPATIENT
Start: 2019-07-15 | End: 2019-07-15 | Stop reason: HOSPADM

## 2019-07-15 RX ORDER — NALOXONE HCL 0.4 MG/ML
0.4 VIAL (ML) INJECTION AS NEEDED
Status: DISCONTINUED | OUTPATIENT
Start: 2019-07-15 | End: 2019-07-15 | Stop reason: HOSPADM

## 2019-07-15 RX ORDER — KETOROLAC TROMETHAMINE 30 MG/ML
INJECTION, SOLUTION INTRAMUSCULAR; INTRAVENOUS AS NEEDED
Status: DISCONTINUED | OUTPATIENT
Start: 2019-07-15 | End: 2019-07-15 | Stop reason: SURG

## 2019-07-15 RX ORDER — HYDROCODONE BITARTRATE AND ACETAMINOPHEN 5; 325 MG/1; MG/1
1 TABLET ORAL ONCE AS NEEDED
Status: DISCONTINUED | OUTPATIENT
Start: 2019-07-15 | End: 2019-07-15 | Stop reason: HOSPADM

## 2019-07-15 RX ORDER — PHENYLEPHRINE HCL IN 0.9% NACL 0.5 MG/5ML
SYRINGE (ML) INTRAVENOUS AS NEEDED
Status: DISCONTINUED | OUTPATIENT
Start: 2019-07-15 | End: 2019-07-15 | Stop reason: SURG

## 2019-07-15 RX ORDER — GINSENG 100 MG
CAPSULE ORAL AS NEEDED
Status: DISCONTINUED | OUTPATIENT
Start: 2019-07-15 | End: 2019-07-15 | Stop reason: HOSPADM

## 2019-07-15 RX ORDER — MEPERIDINE HYDROCHLORIDE 25 MG/ML
12.5 INJECTION INTRAMUSCULAR; INTRAVENOUS; SUBCUTANEOUS
Status: DISCONTINUED | OUTPATIENT
Start: 2019-07-15 | End: 2019-07-15 | Stop reason: HOSPADM

## 2019-07-15 RX ORDER — LABETALOL HYDROCHLORIDE 5 MG/ML
5 INJECTION, SOLUTION INTRAVENOUS
Status: DISCONTINUED | OUTPATIENT
Start: 2019-07-15 | End: 2019-07-15 | Stop reason: HOSPADM

## 2019-07-15 RX ORDER — PROMETHAZINE HYDROCHLORIDE 25 MG/1
25 TABLET ORAL ONCE AS NEEDED
Status: DISCONTINUED | OUTPATIENT
Start: 2019-07-15 | End: 2019-07-15 | Stop reason: HOSPADM

## 2019-07-15 RX ORDER — DEXAMETHASONE SODIUM PHOSPHATE 4 MG/ML
INJECTION, SOLUTION INTRA-ARTICULAR; INTRALESIONAL; INTRAMUSCULAR; INTRAVENOUS; SOFT TISSUE AS NEEDED
Status: DISCONTINUED | OUTPATIENT
Start: 2019-07-15 | End: 2019-07-15 | Stop reason: SURG

## 2019-07-15 RX ORDER — SODIUM CHLORIDE 0.9 % (FLUSH) 0.9 %
3 SYRINGE (ML) INJECTION EVERY 12 HOURS SCHEDULED
Status: DISCONTINUED | OUTPATIENT
Start: 2019-07-15 | End: 2019-07-15 | Stop reason: HOSPADM

## 2019-07-15 RX ORDER — EPHEDRINE SULFATE/0.9% NACL/PF 25 MG/5 ML
SYRINGE (ML) INTRAVENOUS AS NEEDED
Status: DISCONTINUED | OUTPATIENT
Start: 2019-07-15 | End: 2019-07-15 | Stop reason: SURG

## 2019-07-15 RX ORDER — HYDRALAZINE HYDROCHLORIDE 20 MG/ML
5 INJECTION INTRAMUSCULAR; INTRAVENOUS
Status: DISCONTINUED | OUTPATIENT
Start: 2019-07-15 | End: 2019-07-15 | Stop reason: HOSPADM

## 2019-07-15 RX ORDER — LIDOCAINE HYDROCHLORIDE 10 MG/ML
INJECTION, SOLUTION EPIDURAL; INFILTRATION; INTRACAUDAL; PERINEURAL AS NEEDED
Status: DISCONTINUED | OUTPATIENT
Start: 2019-07-15 | End: 2019-07-15 | Stop reason: SURG

## 2019-07-15 RX ORDER — HYDROMORPHONE HCL 110MG/55ML
0.5 PATIENT CONTROLLED ANALGESIA SYRINGE INTRAVENOUS
Status: DISCONTINUED | OUTPATIENT
Start: 2019-07-15 | End: 2019-07-15 | Stop reason: HOSPADM

## 2019-07-15 RX ORDER — FENTANYL CITRATE 50 UG/ML
INJECTION, SOLUTION INTRAMUSCULAR; INTRAVENOUS AS NEEDED
Status: DISCONTINUED | OUTPATIENT
Start: 2019-07-15 | End: 2019-07-15 | Stop reason: SURG

## 2019-07-15 RX ORDER — SODIUM CHLORIDE, SODIUM LACTATE, POTASSIUM CHLORIDE, CALCIUM CHLORIDE 600; 310; 30; 20 MG/100ML; MG/100ML; MG/100ML; MG/100ML
9 INJECTION, SOLUTION INTRAVENOUS CONTINUOUS PRN
Status: DISCONTINUED | OUTPATIENT
Start: 2019-07-15 | End: 2019-07-15 | Stop reason: HOSPADM

## 2019-07-15 RX ORDER — PROMETHAZINE HYDROCHLORIDE 25 MG/ML
6.25 INJECTION, SOLUTION INTRAMUSCULAR; INTRAVENOUS ONCE AS NEEDED
Status: DISCONTINUED | OUTPATIENT
Start: 2019-07-15 | End: 2019-07-15 | Stop reason: HOSPADM

## 2019-07-15 RX ORDER — ONDANSETRON 2 MG/ML
4 INJECTION INTRAMUSCULAR; INTRAVENOUS ONCE AS NEEDED
Status: DISCONTINUED | OUTPATIENT
Start: 2019-07-15 | End: 2019-07-15 | Stop reason: HOSPADM

## 2019-07-15 RX ORDER — FLUMAZENIL 0.1 MG/ML
0.1 INJECTION INTRAVENOUS AS NEEDED
Status: DISCONTINUED | OUTPATIENT
Start: 2019-07-15 | End: 2019-07-15 | Stop reason: HOSPADM

## 2019-07-15 RX ORDER — SODIUM CHLORIDE 9 MG/ML
100 INJECTION, SOLUTION INTRAVENOUS CONTINUOUS
Status: DISCONTINUED | OUTPATIENT
Start: 2019-07-15 | End: 2019-07-15 | Stop reason: HOSPADM

## 2019-07-15 RX ORDER — DIPHENHYDRAMINE HYDROCHLORIDE 50 MG/ML
12.5 INJECTION INTRAMUSCULAR; INTRAVENOUS
Status: DISCONTINUED | OUTPATIENT
Start: 2019-07-15 | End: 2019-07-15 | Stop reason: HOSPADM

## 2019-07-15 RX ADMIN — KETOROLAC TROMETHAMINE 30 MG: 30 INJECTION, SOLUTION INTRAMUSCULAR at 14:48

## 2019-07-15 RX ADMIN — DEXAMETHASONE SODIUM PHOSPHATE 4 MG: 4 INJECTION, SOLUTION INTRAMUSCULAR; INTRAVENOUS at 14:30

## 2019-07-15 RX ADMIN — PHENYLEPHRINE HYDROCHLORIDE 100 MCG: 10 INJECTION INTRAVENOUS at 14:41

## 2019-07-15 RX ADMIN — SODIUM CHLORIDE, SODIUM LACTATE, POTASSIUM CHLORIDE, AND CALCIUM CHLORIDE 9 ML/HR: 600; 310; 30; 20 INJECTION, SOLUTION INTRAVENOUS at 12:33

## 2019-07-15 RX ADMIN — PHENYLEPHRINE HYDROCHLORIDE 100 MCG: 10 INJECTION INTRAVENOUS at 14:52

## 2019-07-15 RX ADMIN — PHENYLEPHRINE HYDROCHLORIDE 100 MCG: 10 INJECTION INTRAVENOUS at 14:57

## 2019-07-15 RX ADMIN — FENTANYL CITRATE 50 MCG: 50 INJECTION, SOLUTION INTRAMUSCULAR; INTRAVENOUS at 14:24

## 2019-07-15 RX ADMIN — LIDOCAINE HYDROCHLORIDE 40 MG: 10 INJECTION, SOLUTION EPIDURAL; INFILTRATION; INTRACAUDAL; PERINEURAL at 14:24

## 2019-07-15 RX ADMIN — PHENYLEPHRINE HYDROCHLORIDE 100 MCG: 10 INJECTION INTRAVENOUS at 14:48

## 2019-07-15 RX ADMIN — CEFAZOLIN SODIUM 2 G: 1 INJECTION, POWDER, FOR SOLUTION INTRAMUSCULAR; INTRAVENOUS at 14:28

## 2019-07-15 RX ADMIN — Medication 5 MG: at 14:57

## 2019-07-15 RX ADMIN — ONDANSETRON 4 MG: 2 INJECTION INTRAMUSCULAR; INTRAVENOUS at 14:48

## 2019-07-15 RX ADMIN — FENTANYL CITRATE 50 MCG: 50 INJECTION, SOLUTION INTRAMUSCULAR; INTRAVENOUS at 14:39

## 2019-07-15 RX ADMIN — PROPOFOL 120 MG: 10 INJECTION, EMULSION INTRAVENOUS at 14:24

## 2019-07-15 NOTE — ANESTHESIA PREPROCEDURE EVALUATION
Anesthesia Evaluation     Patient summary reviewed and Nursing notes reviewed   no history of anesthetic complications:  NPO Solid Status: > 8 hours  NPO Liquid Status: > 8 hours           Airway   Mallampati: II  TM distance: >3 FB  Neck ROM: full  No difficulty expected  Dental - normal exam     Pulmonary - normal exam   (+) asthma,   Cardiovascular - normal exam    (+) hyperlipidemia,       Neuro/Psych  GI/Hepatic/Renal/Endo    (+) obesity,       Musculoskeletal     Abdominal    Substance History      OB/GYN          Other   (+) arthritis   history of cancer    ROS/Med Hx Other: Allergies, skin cancer, neck pain, goiter                  Anesthesia Plan    ASA 2     general   (Patient identified; pre-operative vital signs, all relevant labs/studies, complete medical/surgical/anesthetic history, full medication list, full allergy list, and NPO status obtained/reviewed; physical assessment performed; anesthetic options, side effects, potential complications, risks, and benefits discussed; questions answered; written anesthesia consent obtained; patient cleared for procedure; anesthesia machine and equipment checked and functioning)  intravenous induction   Anesthetic plan, all risks, benefits, and alternatives have been provided, discussed and informed consent has been obtained with: patient.    Plan discussed with CAA.

## 2019-07-15 NOTE — INTERVAL H&P NOTE
The left posterior lateral scalp excision did turned out to be a basal cell carcinoma.  There were cancer cells extending to the undesignated margin.  I counseled him about the risks and benefits of proceeding back to the operating room for a reexcision.  He understood and was willing to proceed.  We talked about the possible closure techniques including primary closure healing by secondary intention or more complicated closure techniques like grafting if this turns out to require a large excision.  I am going to send a frozen section to ensure we get adequate margins at this operation.

## 2019-07-15 NOTE — ANESTHESIA POSTPROCEDURE EVALUATION
Patient: Jim Malik    Procedure Summary     Date:  07/15/19 Room / Location:  [facility] OR  / [facility] MAIN OR    Anesthesia Start:  1421 Anesthesia Stop:  1516    Procedure:  SKIN LESION EXCISION (Left ) Diagnosis:       Basal cell carcinoma, scalp/neck      (Basal cell carcinoma, scalp/neck [C44.41])    Surgeon:  Stephen Zamora MD Provider:  Wilberto Naidu MD    Anesthesia Type:  general ASA Status:  2          Anesthesia Type: general  Last vitals  BP   131/81 (07/15/19 1544)   Temp   97.3 °F (36.3 °C) (07/15/19 1513)   Pulse   68 (07/15/19 1544)   Resp   10 (07/15/19 1544)     SpO2   97 % (07/15/19 1544)     Post Anesthesia Care and Evaluation    Patient location during evaluation: PACU  Patient participation: complete - patient participated  Level of consciousness: awake  Pain scale: See nurse's notes for pain score.  Pain management: adequate  Airway patency: patent  Anesthetic complications: No anesthetic complications  PONV Status: none  Cardiovascular status: acceptable  Respiratory status: acceptable  Hydration status: acceptable    Comments: Patient seen and examined postoperatively; vital signs stable; SpO2 greater than or equal to 90%; cardiopulmonary status stable; nausea/vomiting adequately controlled; pain adequately controlled; no apparent anesthesia complications; patient discharged from anesthesia care when discharge criteria were met

## 2019-07-15 NOTE — OP NOTE
Operative Report:    Patient Name:  Jim Malik  YOB: 1950    Date of Surgery:  7/15/2019     Indications: 69-year-old man who recently underwent excision of multiple skin lesions last week including the left posterior scalp left central scalp and left lower extremity.  The left posterior scalp lesion did turn out to be a basal cell carcinoma and did have cancer cells at the peripheral margin.  I counseled him about the plan for a reexcision.  He understood and was willing to proceed.    Pre-op Diagnosis:   Basal cell carcinoma, scalp/neck [C44.41]       Post-Op Diagnosis Codes:     * Basal cell carcinoma, scalp/neck [C44.41]    Procedure/CPT® Codes:      Procedure(s):  Excision of left scalp basal cell carcinoma total reexcision diameter 3.5 cm    Staff:  Surgeon(s):  Stephen Zamora MD         Anesthesia: General    Estimated Blood Loss: 10 mL    Implants:    Nothing was implanted during the procedure    Specimen:          Specimens     ID Source Type Tests Collected By Collected At Frozen?      A Scalp Skin · TISSUE PATHOLOGY EXAM   Stephen Zamora MD 7/15/19 6521 Yes     Description: left posterior scalp re-excision basil cell carcinoma, short stitch anterior, long stitch left lateral    Comment: extention [id number], room 10               Findings: Intraoperative frozen section for margins were negative.      Complications: None    Description of Procedure: Patient was taken to the operating room placed on the table in the right lateral decubitus position under general anesthesia.  His left scalp was prepped and draped normal sterile fashion.  A timeout was performed identifying the correct patient procedure site of operation.  I began the operation by marking a 5 mm border around the previous incision that had healed quite well.  There was no skin abnormality to suggest residual basal cell carcinoma.  I then infiltrated the skin and subtendinous tissues with local anesthetic approximately 5 cc were  used.  I then made an elliptical incision with a 15 blade scalpel and dissected through the subtenons tissues with the Bovie.  The specimen was removed and had a total excised diameter of 3.5 cm.  It was labeled with the short stitch anterior long stitch left lateral and sent to pathology for frozen section review for margins.  I then used a Bovie to stop bleeding.  Subcutaneous flaps were raised medially and laterally approximately 1 cm.  I then used skin staples to close the wound.      Stephen Zamora MD     Date: 7/15/2019  Time: 3:18 PM

## 2019-07-16 LAB
LAB AP CASE REPORT: NORMAL
LAB AP CLINICAL INFORMATION: NORMAL
LAB AP DIAGNOSIS COMMENT: NORMAL
Lab: NORMAL
PATH REPORT.FINAL DX SPEC: NORMAL
PATH REPORT.GROSS SPEC: NORMAL

## 2019-07-19 RX ORDER — BUDESONIDE AND FORMOTEROL FUMARATE DIHYDRATE 160; 4.5 UG/1; UG/1
AEROSOL RESPIRATORY (INHALATION)
Qty: 10.2 INHALER | Refills: 3 | Status: SHIPPED | OUTPATIENT
Start: 2019-07-19 | End: 2020-04-14

## 2019-07-25 ENCOUNTER — OFFICE VISIT (OUTPATIENT)
Dept: SURGERY | Facility: CLINIC | Age: 69
End: 2019-07-25

## 2019-07-25 VITALS
BODY MASS INDEX: 31.13 KG/M2 | DIASTOLIC BLOOD PRESSURE: 77 MMHG | HEIGHT: 72 IN | HEART RATE: 78 BPM | TEMPERATURE: 97.5 F | SYSTOLIC BLOOD PRESSURE: 127 MMHG | OXYGEN SATURATION: 97 % | WEIGHT: 229.8 LBS

## 2019-07-25 DIAGNOSIS — C44.41 BASAL CELL CARCINOMA, SCALP/NECK: Primary | ICD-10-CM

## 2019-07-25 PROCEDURE — 99024 POSTOP FOLLOW-UP VISIT: CPT | Performed by: SURGERY

## 2019-07-25 NOTE — PROGRESS NOTES
"Subjective   Jim Malik is a 69 y.o. male.   69-year-old woman who is status post excision of basal cell cancer of his left scalp.  He had a positive margin initially and so had go back for a reexcision.  The new margin is completely excised.  He denies any issues with significant pain or drainage in the incision.    Objective   /77 (BP Location: Right arm, Patient Position: Sitting, Cuff Size: Adult)   Pulse 78   Temp 97.5 °F (36.4 °C) (Oral)   Ht 182.9 cm (72\")   Wt 104 kg (229 lb 12.8 oz)   SpO2 97%   BMI 31.17 kg/m²   Physical Exam   Constitutional: He appears well-developed and well-nourished.   HENT:   And left scalp there is a healing incision without erythema or drainage.  Staples are in place.  No evidence of infection.     Pathology reviewed.  Completely excised basal cell cancer from the left scalp.  Assessment/Plan   Jim was seen today for post-op.    Diagnoses and all orders for this visit:    Basal cell carcinoma, scalp/neck    Staples removed in office.  Can follow-up as needed.  He will need ongoing skin checks through his primary care doctor or a neurologist.    Stephen Zamora MD  7/25/2019  2:50 PM    "

## 2019-09-25 ENCOUNTER — TELEPHONE (OUTPATIENT)
Dept: FAMILY MEDICINE CLINIC | Facility: CLINIC | Age: 69
End: 2019-09-25

## 2019-09-25 RX ORDER — NAPROXEN 500 MG/1
500 TABLET ORAL 2 TIMES DAILY WITH MEALS
Qty: 180 TABLET | Refills: 1 | Status: SHIPPED | OUTPATIENT
Start: 2019-09-25 | End: 2020-10-26 | Stop reason: SDUPTHER

## 2019-11-30 RX ORDER — MONTELUKAST SODIUM 10 MG/1
TABLET ORAL
Qty: 30 TABLET | Refills: 8 | Status: SHIPPED | OUTPATIENT
Start: 2019-11-30 | End: 2021-01-08 | Stop reason: SDUPTHER

## 2019-12-11 ENCOUNTER — OFFICE VISIT (OUTPATIENT)
Dept: FAMILY MEDICINE CLINIC | Facility: CLINIC | Age: 69
End: 2019-12-11

## 2019-12-11 VITALS
HEART RATE: 71 BPM | RESPIRATION RATE: 16 BRPM | OXYGEN SATURATION: 98 % | WEIGHT: 229.4 LBS | HEIGHT: 72 IN | SYSTOLIC BLOOD PRESSURE: 146 MMHG | TEMPERATURE: 97.8 F | DIASTOLIC BLOOD PRESSURE: 83 MMHG | BODY MASS INDEX: 31.07 KG/M2

## 2019-12-11 DIAGNOSIS — Z00.00 ENCOUNTER FOR MEDICARE ANNUAL WELLNESS EXAM: Primary | ICD-10-CM

## 2019-12-11 PROBLEM — M75.01 ADHESIVE CAPSULITIS OF RIGHT SHOULDER: Status: RESOLVED | Noted: 2018-04-16 | Resolved: 2019-12-11

## 2019-12-11 PROBLEM — M65.819 OTHER SYNOVITIS AND TENOSYNOVITIS, UNSPECIFIED SHOULDER: Status: RESOLVED | Noted: 2017-12-18 | Resolved: 2019-12-11

## 2019-12-11 PROBLEM — D23.9 DERMATOFIBROMA: Status: RESOLVED | Noted: 2019-06-10 | Resolved: 2019-12-11

## 2019-12-11 PROBLEM — L98.9 DISORDER OF SKIN OR SUBCUTANEOUS TISSUE: Status: RESOLVED | Noted: 2019-06-25 | Resolved: 2019-12-11

## 2019-12-11 PROBLEM — M54.12 CERVICAL RADICULITIS: Status: RESOLVED | Noted: 2018-12-17 | Resolved: 2019-12-11

## 2019-12-11 PROBLEM — M25.519 ACROMIOCLAVICULAR JOINT PAIN: Status: RESOLVED | Noted: 2018-06-25 | Resolved: 2019-12-11

## 2019-12-11 PROCEDURE — G0438 PPPS, INITIAL VISIT: HCPCS | Performed by: FAMILY MEDICINE

## 2019-12-11 NOTE — PROGRESS NOTES
The ABCs of the Annual Wellness Visit  Initial Medicare Wellness Visit    Chief Complaint   Patient presents with   • Medicare Wellness-Initial Visit       Subjective   History of Present Illness:  Jim Malik is a 69 y.o. male who presents for an Initial Medicare Wellness Visit.    HEALTH RISK ASSESSMENT    Recent Hospitalizations:  No hospitalization(s) within the last year.    Current Medical Providers:  Patient Care Team:  Naeem Mehta Jr., MD as PCP - General (Family Medicine)  Naeem Mehta Jr., MD as PCP - Claims Attributed    Smoking Status:  Social History     Tobacco Use   Smoking Status Never Smoker   Smokeless Tobacco Never Used       Alcohol Consumption:  Social History     Substance and Sexual Activity   Alcohol Use Yes   • Alcohol/week: 1.0 standard drinks   • Types: 1 Cans of beer per week   • Frequency: Never       Depression Screen:   PHQ-2/PHQ-9 Depression Screening 12/11/2019   Little interest or pleasure in doing things 0   Feeling down, depressed, or hopeless 0   Trouble falling or staying asleep, or sleeping too much 0   Feeling tired or having little energy 0   Poor appetite or overeating 0   Feeling bad about yourself - or that you are a failure or have let yourself or your family down 0   Trouble concentrating on things, such as reading the newspaper or watching television 0   Moving or speaking so slowly that other people could have noticed. Or the opposite - being so fidgety or restless that you have been moving around a lot more than usual 0   Thoughts that you would be better off dead, or of hurting yourself in some way 0   Total Score 0       Fall Risk Screen:  STEADI Fall Risk Assessment was completed, and patient is at LOW risk for falls.Assessment completed on:12/11/2019    Health Habits and Functional and Cognitive Screening:  Functional & Cognitive Status 12/11/2019   Do you have difficulty preparing food and eating? No   Do you have difficulty bathing yourself, getting  dressed or grooming yourself? No   Do you have difficulty using the toilet? No   Do you have difficulty moving around from place to place? No   Do you have trouble with steps or getting out of a bed or a chair? No   Current Diet Well Balanced Diet   Dental Exam Up to date   Eye Exam Up to date   Exercise (times per week) 7 times per week   Current Exercise Activities Include Yard Work   Do you need help using the phone?  No   Are you deaf or do you have serious difficulty hearing?  No   Do you need help with transportation? No   Do you need help shopping? No   Do you need help preparing meals?  No   Do you need help with housework?  No   Do you need help with laundry? No   Do you need help taking your medications? No   Do you need help managing money? No   Do you ever drive or ride in a car without wearing a seat belt? No   Have you felt unusual stress, anger or loneliness in the last month? No   Who do you live with? Spouse   If you need help, do you have trouble finding someone available to you? No   Have you been bothered in the last four weeks by sexual problems? No   Do you have difficulty concentrating, remembering or making decisions? No         Does the patient have evidence of cognitive impairment? No    Asprin use counseling:Does not need ASA (and currently is not on it)    Age-appropriate Screening Schedule:  Refer to the list below for future screening recommendations based on patient's age, sex and/or medical conditions. Orders for these recommended tests are listed in the plan section. The patient has been provided with a written plan.    Health Maintenance   Topic Date Due   • TDAP/TD VACCINES (1 - Tdap) 02/09/1962   • ZOSTER VACCINE (1 of 2) 02/09/2001   • PNEUMOCOCCAL VACCINES (65+ LOW/MEDIUM RISK) (1 of 2 - PCV13) 02/09/2016   • COLONOSCOPY  06/13/2019   • INFLUENZA VACCINE  08/01/2019   • LIPID PANEL  06/10/2020          The following portions of the patient's history were reviewed and updated as  "appropriate: allergies, current medications, past family history, past medical history, past social history, past surgical history and problem list.    Outpatient Medications Prior to Visit   Medication Sig Dispense Refill   • albuterol sulfate HFA (PROVENTIL HFA) 108 (90 Base) MCG/ACT inhaler PROVENTIL  (90 Base) MCG/ACT AERS     • HYDROcodone-acetaminophen (NORCO) 5-325 MG per tablet Take 1 tablet by mouth 1 (One) Time As Needed for Moderate Pain  for up to 1 dose. 6 tablet 0   • montelukast (SINGULAIR) 10 MG tablet TAKE 1 TABLET BY MOUTH DAILY 30 tablet 8   • naproxen (NAPROSYN) 500 MG tablet Take 1 tablet by mouth 2 (Two) Times a Day With Meals. 180 tablet 1   • sildenafil (VIAGRA) 100 MG tablet TAKE 1/2 - 1 TABLET BY MOUTH 1 HOUR PRIOR TO INTERCOURSE  5   • SYMBICORT 160-4.5 MCG/ACT inhaler INHALE 2 PUFFS BY MOUTH TWICE A DAY 10.2 inhaler 3     No facility-administered medications prior to visit.        Patient Active Problem List   Diagnosis   • Multinodular goiter   • Hyperlipidemia   • Asthma   • Allergic rhinitis   • Basal cell carcinoma, scalp/neck       Advanced Care Planning:  Patient does not have an advance directive - information provided to the patient today    Review of Systems    Compared to one year ago, the patient feels his physical health is the same.  Compared to one year ago, the patient feels his mental health is the same.    Reviewed chart for potential of high risk medication in the elderly: yes  Reviewed chart for potential of harmful drug interactions in the elderly:yes    Objective         Vitals:    12/11/19 0922   BP: 146/83   BP Location: Left arm   Patient Position: Sitting   Cuff Size: Adult   Pulse: 71   Resp: 16   Temp: 97.8 °F (36.6 °C)   TempSrc: Oral   SpO2: 98%   Weight: 104 kg (229 lb 6.4 oz)   Height: 182.9 cm (72\")       Body mass index is 31.11 kg/m².  Discussed the patient's BMI with him. The BMI is in the acceptable range.    Physical Exam   Constitutional: He is " oriented to person, place, and time. He appears well-developed and well-nourished.   HENT:   Head: Normocephalic and atraumatic.   Eyes: Pupils are equal, round, and reactive to light. EOM are normal.   Neck: Neck supple.   Cardiovascular: Normal rate, regular rhythm, normal heart sounds and intact distal pulses.   Pulmonary/Chest: Effort normal and breath sounds normal.   Abdominal: Soft. Bowel sounds are normal.   Musculoskeletal: Normal range of motion.   Neurological: He is oriented to person, place, and time.   Skin: Skin is warm and dry.   Psychiatric: He has a normal mood and affect. His behavior is normal. Judgment and thought content normal.   Nursing note and vitals reviewed.            Assessment/Plan   Medicare Risks and Personalized Health Plan  CMS Preventative Services Quick Reference  Advance Directive Discussion  Immunizations Discussed/Encouraged (specific immunizations; Td, Influenza, Pneumococcal 23, Prevnar and Shingrix )    The above risks/problems have been discussed with the patient.  Pertinent information has been shared with the patient in the After Visit Summary.  Follow up plans and orders are seen below in the Assessment/Plan Section.    Diagnoses and all orders for this visit:    1. Encounter for Medicare annual wellness exam (Primary)      Follow Up:  Return in about 6 months (around 6/11/2020) for Recheck.     An After Visit Summary and PPPS were given to the patient.

## 2019-12-11 NOTE — PATIENT INSTRUCTIONS
Continue your current healthy lifestyle.    Follow up in the office in 6 months.    Have the immunizations done.    Create a living will.

## 2019-12-11 NOTE — PROGRESS NOTES
"Subjective   Jim Malik is a 69 y.o. male.     Chief Complaint   Patient presents with   • Medicare Wellness-Initial Visit       HPI  ***        The following portions of the patient's history were reviewed and updated as appropriate: allergies, current medications, past family history, past medical history, past social history, past surgical history and problem list.    Review of Systems    Objective     /83 (BP Location: Left arm, Patient Position: Sitting, Cuff Size: Adult)   Pulse 71   Temp 97.8 °F (36.6 °C) (Oral)   Resp 16   Ht 182.9 cm (72\")   Wt 104 kg (229 lb 6.4 oz)   SpO2 98%   BMI 31.11 kg/m²     Physical Exam      Assessment/Plan   There are no diagnoses linked to this encounter.  There are no Patient Instructions on file for this visit.    Naeem Mehta Jr., MD    12/11/19  "

## 2020-02-04 RX ORDER — SILDENAFIL 100 MG/1
TABLET, FILM COATED ORAL
Qty: 12 TABLET | Refills: 7 | Status: SHIPPED | OUTPATIENT
Start: 2020-02-04 | End: 2021-05-11

## 2020-04-14 RX ORDER — BUDESONIDE AND FORMOTEROL FUMARATE DIHYDRATE 160; 4.5 UG/1; UG/1
AEROSOL RESPIRATORY (INHALATION)
Qty: 10.2 INHALER | Refills: 3 | Status: SHIPPED | OUTPATIENT
Start: 2020-04-14 | End: 2022-01-18 | Stop reason: SDUPTHER

## 2020-05-07 ENCOUNTER — OFFICE VISIT (OUTPATIENT)
Dept: FAMILY MEDICINE CLINIC | Facility: CLINIC | Age: 70
End: 2020-05-07

## 2020-05-07 VITALS
BODY MASS INDEX: 31.15 KG/M2 | HEIGHT: 72 IN | DIASTOLIC BLOOD PRESSURE: 70 MMHG | HEART RATE: 87 BPM | SYSTOLIC BLOOD PRESSURE: 114 MMHG | RESPIRATION RATE: 20 BRPM | OXYGEN SATURATION: 98 % | WEIGHT: 230 LBS

## 2020-05-07 DIAGNOSIS — M65.322 TRIGGER INDEX FINGER OF LEFT HAND: ICD-10-CM

## 2020-05-07 DIAGNOSIS — M65.351 TRIGGER LITTLE FINGER OF RIGHT HAND: Primary | ICD-10-CM

## 2020-05-07 PROBLEM — M65.30 TRIGGER FINGER: Status: ACTIVE | Noted: 2020-05-07

## 2020-05-07 PROCEDURE — 99212 OFFICE O/P EST SF 10 MIN: CPT | Performed by: FAMILY MEDICINE

## 2020-05-07 PROCEDURE — 96372 THER/PROPH/DIAG INJ SC/IM: CPT | Performed by: FAMILY MEDICINE

## 2020-05-07 RX ORDER — TRIAMCINOLONE ACETONIDE 40 MG/ML
40 INJECTION, SUSPENSION INTRA-ARTICULAR; INTRAMUSCULAR
Status: COMPLETED | OUTPATIENT
Start: 2020-05-07 | End: 2020-05-07

## 2020-05-07 RX ADMIN — TRIAMCINOLONE ACETONIDE 40 MG: 40 INJECTION, SUSPENSION INTRA-ARTICULAR; INTRAMUSCULAR at 16:11

## 2020-05-07 RX ADMIN — TRIAMCINOLONE ACETONIDE 40 MG: 40 INJECTION, SUSPENSION INTRA-ARTICULAR; INTRAMUSCULAR at 16:09

## 2020-05-07 NOTE — PROGRESS NOTES
Procedure   Injection Tendon or Ligament  Date/Time: 5/7/2020 4:11 PM  Performed by: Naeem Mehta Jr., MD  Authorized by: Naeem Mehta Jr., MD   Preparation: Patient was prepped and draped in the usual sterile fashion.  Local anesthesia used: no    Anesthesia:  Local anesthesia used: no    Sedation:  Patient sedated: no    Patient tolerance: Patient tolerated the procedure well with no immediate complications  Comments: I injected the left index finger with 20 gm of Kenalog; he tolerated the procedure  Medications administered: 40 mg triamcinolone acetonide 40 MG/ML

## 2020-05-07 NOTE — PROGRESS NOTES
Procedure   Injection Tendon or Ligament  Date/Time: 5/7/2020 4:09 PM  Performed by: Naeem Mehta Jr., MD  Authorized by: Naeem Mehta Jr., MD   Preparation: Patient was prepped and draped in the usual sterile fashion.  Local anesthesia used: no    Anesthesia:  Local anesthesia used: no    Sedation:  Patient sedated: no    Patient tolerance: Patient tolerated the procedure well with no immediate complications  Comments: I injected the right little finger with 20 mgm Kenalog  Medications administered: 40 mg triamcinolone acetonide 40 MG/ML

## 2020-05-27 ENCOUNTER — TRANSCRIBE ORDERS (OUTPATIENT)
Dept: ADMINISTRATIVE | Facility: HOSPITAL | Age: 70
End: 2020-05-27

## 2020-05-27 DIAGNOSIS — M25.562 ACUTE PAIN OF LEFT KNEE: Primary | ICD-10-CM

## 2020-05-30 ENCOUNTER — HOSPITAL ENCOUNTER (OUTPATIENT)
Dept: MRI IMAGING | Facility: HOSPITAL | Age: 70
Discharge: HOME | End: 2020-05-30
Admitting: ORTHOPAEDIC SURGERY

## 2020-05-30 DIAGNOSIS — M25.562 ACUTE PAIN OF LEFT KNEE: ICD-10-CM

## 2020-05-30 PROCEDURE — 73721 MRI JNT OF LWR EXTRE W/O DYE: CPT

## 2020-06-02 ENCOUNTER — LAB (OUTPATIENT)
Dept: LAB | Facility: HOSPITAL | Age: 70
End: 2020-06-02

## 2020-06-02 ENCOUNTER — TRANSCRIBE ORDERS (OUTPATIENT)
Dept: ADMINISTRATIVE | Facility: HOSPITAL | Age: 70
End: 2020-06-02

## 2020-06-02 ENCOUNTER — HOSPITAL ENCOUNTER (OUTPATIENT)
Dept: CARDIOLOGY | Facility: HOSPITAL | Age: 70
Discharge: HOME | End: 2020-06-02
Admitting: ORTHOPAEDIC SURGERY

## 2020-06-02 ENCOUNTER — HOSPITAL ENCOUNTER (OUTPATIENT)
Dept: GENERAL RADIOLOGY | Facility: HOSPITAL | Age: 70
Discharge: HOME | End: 2020-06-02

## 2020-06-02 DIAGNOSIS — S83.207A TEAR OF MENISCUS OF LEFT KNEE, UNSPECIFIED MENISCUS, UNSPECIFIED TEAR TYPE, UNSPECIFIED WHETHER OLD OR CURRENT TEAR: ICD-10-CM

## 2020-06-02 DIAGNOSIS — S83.207A TEAR OF MENISCUS OF LEFT KNEE, UNSPECIFIED MENISCUS, UNSPECIFIED TEAR TYPE, UNSPECIFIED WHETHER OLD OR CURRENT TEAR: Primary | ICD-10-CM

## 2020-06-02 LAB
ANION GAP SERPL CALCULATED.3IONS-SCNC: 9.2 MMOL/L (ref 5–15)
BASOPHILS # BLD AUTO: 0.05 10*3/MM3 (ref 0–0.2)
BASOPHILS NFR BLD AUTO: 0.8 % (ref 0–1.5)
BUN BLD-MCNC: 20 MG/DL (ref 8–23)
BUN/CREAT SERPL: 15.6 (ref 7–25)
CALCIUM SPEC-SCNC: 9.6 MG/DL (ref 8.6–10.5)
CHLORIDE SERPL-SCNC: 103 MMOL/L (ref 98–107)
CO2 SERPL-SCNC: 28.8 MMOL/L (ref 22–29)
CREAT BLD-MCNC: 1.28 MG/DL (ref 0.76–1.27)
DEPRECATED RDW RBC AUTO: 42 FL (ref 37–54)
EOSINOPHIL # BLD AUTO: 0.24 10*3/MM3 (ref 0–0.4)
EOSINOPHIL NFR BLD AUTO: 3.8 % (ref 0.3–6.2)
ERYTHROCYTE [DISTWIDTH] IN BLOOD BY AUTOMATED COUNT: 12.2 % (ref 12.3–15.4)
GFR SERPL CREATININE-BSD FRML MDRD: 56 ML/MIN/1.73
GLUCOSE BLD-MCNC: 90 MG/DL (ref 65–99)
HCT VFR BLD AUTO: 41.8 % (ref 37.5–51)
HGB BLD-MCNC: 14 G/DL (ref 13–17.7)
IMM GRANULOCYTES # BLD AUTO: 0.01 10*3/MM3 (ref 0–0.05)
IMM GRANULOCYTES NFR BLD AUTO: 0.2 % (ref 0–0.5)
LYMPHOCYTES # BLD AUTO: 1.56 10*3/MM3 (ref 0.7–3.1)
LYMPHOCYTES NFR BLD AUTO: 24.6 % (ref 19.6–45.3)
MCH RBC QN AUTO: 31.6 PG (ref 26.6–33)
MCHC RBC AUTO-ENTMCNC: 33.5 G/DL (ref 31.5–35.7)
MCV RBC AUTO: 94.4 FL (ref 79–97)
MONOCYTES # BLD AUTO: 0.46 10*3/MM3 (ref 0.1–0.9)
MONOCYTES NFR BLD AUTO: 7.2 % (ref 5–12)
NEUTROPHILS # BLD AUTO: 4.03 10*3/MM3 (ref 1.7–7)
NEUTROPHILS NFR BLD AUTO: 63.4 % (ref 42.7–76)
NRBC BLD AUTO-RTO: 0 /100 WBC (ref 0–0.2)
PLATELET # BLD AUTO: 180 10*3/MM3 (ref 140–450)
PMV BLD AUTO: 9.6 FL (ref 6–12)
POTASSIUM BLD-SCNC: 4.4 MMOL/L (ref 3.5–5.2)
RBC # BLD AUTO: 4.43 10*6/MM3 (ref 4.14–5.8)
SODIUM BLD-SCNC: 141 MMOL/L (ref 136–145)
WBC NRBC COR # BLD: 6.35 10*3/MM3 (ref 3.4–10.8)

## 2020-06-02 PROCEDURE — 80048 BASIC METABOLIC PNL TOTAL CA: CPT

## 2020-06-02 PROCEDURE — 85025 COMPLETE CBC W/AUTO DIFF WBC: CPT

## 2020-06-02 PROCEDURE — 36415 COLL VENOUS BLD VENIPUNCTURE: CPT

## 2020-06-02 PROCEDURE — 71046 X-RAY EXAM CHEST 2 VIEWS: CPT

## 2020-06-02 PROCEDURE — 93005 ELECTROCARDIOGRAM TRACING: CPT | Performed by: ORTHOPAEDIC SURGERY

## 2020-06-03 PROCEDURE — 93010 ELECTROCARDIOGRAM REPORT: CPT | Performed by: INTERNAL MEDICINE

## 2020-06-15 ENCOUNTER — LAB (OUTPATIENT)
Dept: LAB | Facility: HOSPITAL | Age: 70
End: 2020-06-15

## 2020-06-15 ENCOUNTER — OFFICE VISIT (OUTPATIENT)
Dept: FAMILY MEDICINE CLINIC | Facility: CLINIC | Age: 70
End: 2020-06-15

## 2020-06-15 VITALS
SYSTOLIC BLOOD PRESSURE: 129 MMHG | DIASTOLIC BLOOD PRESSURE: 78 MMHG | RESPIRATION RATE: 20 BRPM | OXYGEN SATURATION: 98 % | HEART RATE: 72 BPM | HEIGHT: 72 IN | BODY MASS INDEX: 31.23 KG/M2 | WEIGHT: 230.6 LBS

## 2020-06-15 DIAGNOSIS — E78.5 HYPERLIPIDEMIA, UNSPECIFIED HYPERLIPIDEMIA TYPE: ICD-10-CM

## 2020-06-15 DIAGNOSIS — J45.20 MILD INTERMITTENT ASTHMA WITHOUT COMPLICATION: Primary | ICD-10-CM

## 2020-06-15 DIAGNOSIS — J45.20 MILD INTERMITTENT ASTHMA WITHOUT COMPLICATION: ICD-10-CM

## 2020-06-15 PROBLEM — M65.30 TRIGGER FINGER: Status: RESOLVED | Noted: 2020-05-07 | Resolved: 2020-06-15

## 2020-06-15 LAB
ALBUMIN SERPL-MCNC: 4.2 G/DL (ref 3.5–5.2)
ALBUMIN/GLOB SERPL: 1.5 G/DL
ALP SERPL-CCNC: 68 U/L (ref 39–117)
ALT SERPL W P-5'-P-CCNC: 16 U/L (ref 1–41)
ANION GAP SERPL CALCULATED.3IONS-SCNC: 9.1 MMOL/L (ref 5–15)
AST SERPL-CCNC: 17 U/L (ref 1–40)
BASOPHILS # BLD AUTO: 0.04 10*3/MM3 (ref 0–0.2)
BASOPHILS NFR BLD AUTO: 0.7 % (ref 0–1.5)
BILIRUB SERPL-MCNC: 0.6 MG/DL (ref 0.2–1.2)
BUN BLD-MCNC: 21 MG/DL (ref 8–23)
BUN/CREAT SERPL: 17.5 (ref 7–25)
CALCIUM SPEC-SCNC: 9.1 MG/DL (ref 8.6–10.5)
CHLORIDE SERPL-SCNC: 102 MMOL/L (ref 98–107)
CHOLEST SERPL-MCNC: 197 MG/DL (ref 0–200)
CO2 SERPL-SCNC: 26.9 MMOL/L (ref 22–29)
CREAT BLD-MCNC: 1.2 MG/DL (ref 0.76–1.27)
DEPRECATED RDW RBC AUTO: 43 FL (ref 37–54)
EOSINOPHIL # BLD AUTO: 0.22 10*3/MM3 (ref 0–0.4)
EOSINOPHIL NFR BLD AUTO: 3.8 % (ref 0.3–6.2)
ERYTHROCYTE [DISTWIDTH] IN BLOOD BY AUTOMATED COUNT: 12.6 % (ref 12.3–15.4)
GFR SERPL CREATININE-BSD FRML MDRD: 60 ML/MIN/1.73
GLOBULIN UR ELPH-MCNC: 2.8 GM/DL
GLUCOSE BLD-MCNC: 94 MG/DL (ref 65–99)
HCT VFR BLD AUTO: 40.3 % (ref 37.5–51)
HDLC SERPL-MCNC: 64 MG/DL (ref 40–60)
HGB BLD-MCNC: 13.9 G/DL (ref 13–17.7)
IMM GRANULOCYTES # BLD AUTO: 0.03 10*3/MM3 (ref 0–0.05)
IMM GRANULOCYTES NFR BLD AUTO: 0.5 % (ref 0–0.5)
LDLC SERPL CALC-MCNC: 121 MG/DL (ref 0–100)
LDLC/HDLC SERPL: 1.9 {RATIO}
LYMPHOCYTES # BLD AUTO: 1.22 10*3/MM3 (ref 0.7–3.1)
LYMPHOCYTES NFR BLD AUTO: 21 % (ref 19.6–45.3)
MCH RBC QN AUTO: 32.3 PG (ref 26.6–33)
MCHC RBC AUTO-ENTMCNC: 34.5 G/DL (ref 31.5–35.7)
MCV RBC AUTO: 93.7 FL (ref 79–97)
MONOCYTES # BLD AUTO: 0.41 10*3/MM3 (ref 0.1–0.9)
MONOCYTES NFR BLD AUTO: 7.1 % (ref 5–12)
NEUTROPHILS # BLD AUTO: 3.89 10*3/MM3 (ref 1.7–7)
NEUTROPHILS NFR BLD AUTO: 66.9 % (ref 42.7–76)
NRBC BLD AUTO-RTO: 0.2 /100 WBC (ref 0–0.2)
PLATELET # BLD AUTO: 187 10*3/MM3 (ref 140–450)
PMV BLD AUTO: 9.4 FL (ref 6–12)
POTASSIUM BLD-SCNC: 4.6 MMOL/L (ref 3.5–5.2)
PROT SERPL-MCNC: 7 G/DL (ref 6–8.5)
RBC # BLD AUTO: 4.3 10*6/MM3 (ref 4.14–5.8)
SODIUM BLD-SCNC: 138 MMOL/L (ref 136–145)
TRIGL SERPL-MCNC: 58 MG/DL (ref 0–150)
VLDLC SERPL-MCNC: 11.6 MG/DL (ref 5–40)
WBC NRBC COR # BLD: 5.81 10*3/MM3 (ref 3.4–10.8)

## 2020-06-15 PROCEDURE — 99213 OFFICE O/P EST LOW 20 MIN: CPT | Performed by: FAMILY MEDICINE

## 2020-06-15 PROCEDURE — 85025 COMPLETE CBC W/AUTO DIFF WBC: CPT

## 2020-06-15 PROCEDURE — 80061 LIPID PANEL: CPT

## 2020-06-15 PROCEDURE — 36415 COLL VENOUS BLD VENIPUNCTURE: CPT

## 2020-06-15 PROCEDURE — 80053 COMPREHEN METABOLIC PANEL: CPT

## 2020-06-15 NOTE — PROGRESS NOTES
"Subjective   Jim Malik is a 70 y.o. male.     Chief Complaint   Patient presents with   • Hyperlipidemia     6 month follow up   • Asthma   • Allergic Rhinitis       HPI  Chief complaint: Allergic rhinitis, hyperlipidemia    The patient is a 70-year-old white male comes in for follow-up and maintenance of his current problems which include    1.  Allergic rhinitis/asthma-stable-patient is on Singulair 10 mg daily rescue inhaler and Symbicort.  He denied recent cough shortness of breath wheezing or sputum production.    2.  Hyperlipidemia-stable-patient is currently on a diet.    The patient recently had arthroscopy of the left knee.        The following portions of the patient's history were reviewed and updated as appropriate: allergies, current medications, past family history, past medical history, past social history, past surgical history and problem list.    Review of Systems    Objective     /78 (BP Location: Left arm, Patient Position: Sitting, Cuff Size: Large Adult)   Pulse 72   Resp 20   Ht 182.9 cm (72\")   Wt 105 kg (230 lb 9.6 oz)   SpO2 98%   BMI 31.27 kg/m²     Physical Exam   Constitutional: He is oriented to person, place, and time. He appears well-developed and well-nourished.   HENT:   Head: Normocephalic and atraumatic.   Eyes: Pupils are equal, round, and reactive to light. Conjunctivae and EOM are normal.   Neck: Normal range of motion. Neck supple.   Cardiovascular: Normal rate, regular rhythm, normal heart sounds and intact distal pulses.   Pulmonary/Chest: Effort normal and breath sounds normal.   Abdominal: Soft. Bowel sounds are normal.   Musculoskeletal: Normal range of motion.   Neurological: He is alert and oriented to person, place, and time.   Skin: Skin is warm and dry.   Psychiatric: He has a normal mood and affect. His behavior is normal.   Nursing note and vitals reviewed.        Assessment/Plan   Jim was seen today for hyperlipidemia, asthma and allergic " rhinitis.    Diagnoses and all orders for this visit:    Mild intermittent asthma without complication  -     CBC & Differential; Future  -     Comprehensive Metabolic Panel; Future    Hyperlipidemia, unspecified hyperlipidemia type  -     Lipid Panel; Future      Patient Instructions   Continue your current medications and treatment.    Have the follow up labs done and call for results.    Follow up in the office in 6 months.          Naeem Mehta Jr., MD    06/15/20

## 2020-10-26 RX ORDER — NAPROXEN 500 MG/1
500 TABLET ORAL 2 TIMES DAILY WITH MEALS
Qty: 180 TABLET | Refills: 1 | Status: SHIPPED | OUTPATIENT
Start: 2020-10-26 | End: 2022-10-15

## 2021-01-08 RX ORDER — MONTELUKAST SODIUM 10 MG/1
10 TABLET ORAL DAILY
Qty: 90 TABLET | Refills: 8 | Status: SHIPPED | OUTPATIENT
Start: 2021-01-08 | End: 2022-05-08

## 2021-03-12 ENCOUNTER — OFFICE VISIT (OUTPATIENT)
Dept: FAMILY MEDICINE CLINIC | Facility: CLINIC | Age: 71
End: 2021-03-12

## 2021-03-12 VITALS
RESPIRATION RATE: 20 BRPM | SYSTOLIC BLOOD PRESSURE: 144 MMHG | OXYGEN SATURATION: 97 % | HEART RATE: 77 BPM | TEMPERATURE: 96.4 F | HEIGHT: 72 IN | WEIGHT: 233.4 LBS | DIASTOLIC BLOOD PRESSURE: 85 MMHG | BODY MASS INDEX: 31.61 KG/M2

## 2021-03-12 DIAGNOSIS — Z00.00 ENCOUNTER FOR ANNUAL WELLNESS EXAM IN MEDICARE PATIENT: Primary | ICD-10-CM

## 2021-03-12 PROCEDURE — G0439 PPPS, SUBSEQ VISIT: HCPCS | Performed by: FAMILY MEDICINE

## 2021-03-12 RX ORDER — ALBUTEROL SULFATE 90 UG/1
2 AEROSOL, METERED RESPIRATORY (INHALATION) EVERY 4 HOURS PRN
Qty: 8 G | Refills: 5 | Status: SHIPPED | OUTPATIENT
Start: 2021-03-12 | End: 2022-03-11 | Stop reason: SDUPTHER

## 2021-03-12 NOTE — PROGRESS NOTES
The ABCs of the Annual Wellness Visit  Subsequent Medicare Wellness Visit    Chief Complaint   Patient presents with   • Medicare Wellness-subsequent   • Hyperlipidemia   • Asthma     The patient is a 71-year-old white male comes in for Medicare wellness exam.    Patient has history of asthma arthritis and multinodular goiter.  Patient is  he is very active and enjoys restoring old cars.    Subjective   History of Present Illness:  Jim Malik is a 71 y.o. male who presents for a Subsequent Medicare Wellness Visit.    HEALTH RISK ASSESSMENT    Recent Hospitalizations:  No hospitalization(s) within the last year.    Current Medical Providers:  Patient Care Team:  Naeem Mehta Jr., MD as PCP - General (Family Medicine)    Smoking Status:  Social History     Tobacco Use   Smoking Status Never Smoker   Smokeless Tobacco Never Used       Alcohol Consumption:  Social History     Substance and Sexual Activity   Alcohol Use Yes   • Alcohol/week: 1.0 standard drinks   • Types: 1 Cans of beer per week       Depression Screen:   PHQ-2/PHQ-9 Depression Screening 3/12/2021   Little interest or pleasure in doing things 0   Feeling down, depressed, or hopeless 0   Trouble falling or staying asleep, or sleeping too much -   Feeling tired or having little energy -   Poor appetite or overeating -   Feeling bad about yourself - or that you are a failure or have let yourself or your family down -   Trouble concentrating on things, such as reading the newspaper or watching television -   Moving or speaking so slowly that other people could have noticed. Or the opposite - being so fidgety or restless that you have been moving around a lot more than usual -   Thoughts that you would be better off dead, or of hurting yourself in some way -   Total Score 0       Fall Risk Screen:  STEADI Fall Risk Assessment was completed, and patient is at LOW risk for falls.Assessment completed on:3/12/2021    Health Habits and Functional and  Cognitive Screening:  Functional & Cognitive Status 3/12/2021   Do you have difficulty preparing food and eating? No   Do you have difficulty bathing yourself, getting dressed or grooming yourself? No   Do you have difficulty using the toilet? No   Do you have difficulty moving around from place to place? No   Do you have trouble with steps or getting out of a bed or a chair? No   Current Diet Well Balanced Diet   Dental Exam Up to date   Eye Exam Up to date   Exercise (times per week) 7 times per week   Current Exercise Activities Include Yoga   Do you need help using the phone?  No   Are you deaf or do you have serious difficulty hearing?  No   Do you need help with transportation? No   Do you need help shopping? No   Do you need help preparing meals?  No   Do you need help with housework?  No   Do you need help with laundry? No   Do you need help taking your medications? No   Do you need help managing money? No   Do you ever drive or ride in a car without wearing a seat belt? No   Have you felt unusual stress, anger or loneliness in the last month? No   Who do you live with? Spouse   If you need help, do you have trouble finding someone available to you? No   Have you been bothered in the last four weeks by sexual problems? No   Do you have difficulty concentrating, remembering or making decisions? No         Does the patient have evidence of cognitive impairment? No    Asprin use counseling:Does not need ASA (and currently is not on it)    Age-appropriate Screening Schedule:  Refer to the list below for future screening recommendations based on patient's age, sex and/or medical conditions. Orders for these recommended tests are listed in the plan section. The patient has been provided with a written plan.    Health Maintenance   Topic Date Due   • TDAP/TD VACCINES (1 - Tdap) 03/12/2021 (Originally 2/9/1969)   • ZOSTER VACCINE (1 of 2) 06/15/2021 (Originally 2/9/2001)   • LIPID PANEL  06/15/2021   • COLONOSCOPY   "03/18/2025   • INFLUENZA VACCINE  Completed          The following portions of the patient's history were reviewed and updated as appropriate: allergies, current medications, past family history, past medical history, past social history, past surgical history and problem list.    Outpatient Medications Prior to Visit   Medication Sig Dispense Refill   • montelukast (SINGULAIR) 10 MG tablet Take 1 tablet by mouth Daily. 90 tablet 8   • naproxen (Naprosyn) 500 MG tablet Take 1 tablet by mouth 2 (Two) Times a Day With Meals. 180 tablet 1   • sildenafil (VIAGRA) 100 MG tablet TAKE 1/2 - 1 TABLET BY MOUTH 1 HOUR PRIOR TO INTERCOURSE 12 tablet 7   • SYMBICORT 160-4.5 MCG/ACT inhaler INHALE 2 PUFFS BY MOUTH TWICE A DAY 10.2 inhaler 3   • albuterol sulfate HFA (PROVENTIL HFA) 108 (90 Base) MCG/ACT inhaler PROVENTIL  (90 Base) MCG/ACT AERS       No facility-administered medications prior to visit.       Patient Active Problem List   Diagnosis   • Multinodular goiter   • Hyperlipidemia   • Asthma   • Allergic rhinitis   • Basal cell carcinoma, scalp/neck       Advanced Care Planning:  ACP discussion was held with the patient during this visit. Patient does not have an advance directive, information provided.    Review of Systems    Compared to one year ago, the patient feels his physical health is the same.  Compared to one year ago, the patient feels his mental health is the same.    Reviewed chart for potential of high risk medication in the elderly: yes  Reviewed chart for potential of harmful drug interactions in the elderly:yes    Objective         Vitals:    03/12/21 0852   BP: 144/85   BP Location: Left arm   Patient Position: Sitting   Cuff Size: Large Adult   Pulse: 77   Resp: 20   Temp: 96.4 °F (35.8 °C)   TempSrc: Infrared   SpO2: 97%   Weight: 106 kg (233 lb 6.4 oz)   Height: 182.9 cm (72\")       Body mass index is 31.65 kg/m².  Discussed the patient's BMI with him. The BMI is in the acceptable " range.    Physical Exam  Vitals and nursing note reviewed.   Constitutional:       Appearance: He is well-developed and normal weight.   HENT:      Head: Normocephalic and atraumatic.      Nose: Nose normal.      Mouth/Throat:      Mouth: Mucous membranes are dry.      Pharynx: Oropharynx is clear.   Eyes:      Extraocular Movements: Extraocular movements intact.      Conjunctiva/sclera: Conjunctivae normal.      Pupils: Pupils are equal, round, and reactive to light.   Cardiovascular:      Rate and Rhythm: Normal rate and regular rhythm.      Pulses: Normal pulses.      Heart sounds: Normal heart sounds.   Pulmonary:      Effort: Pulmonary effort is normal.      Breath sounds: Normal breath sounds.   Abdominal:      General: Abdomen is flat. Bowel sounds are normal.      Palpations: Abdomen is soft.   Musculoskeletal:         General: Normal range of motion.      Cervical back: Normal range of motion and neck supple.   Skin:     General: Skin is warm and dry.   Neurological:      Mental Status: He is alert and oriented to person, place, and time.   Psychiatric:         Behavior: Behavior normal.         Thought Content: Thought content normal.         Judgment: Judgment normal.               Assessment/Plan   Medicare Risks and Personalized Health Plan  CMS Preventative Services Quick Reference  Advance Directive Discussion  Immunizations Discussed/Encouraged (specific immunizations; Td, Influenza, Pneumococcal 23 and Shingrix )    The above risks/problems have been discussed with the patient.  Pertinent information has been shared with the patient in the After Visit Summary.  Follow up plans and orders are seen below in the Assessment/Plan Section.    Diagnoses and all orders for this visit:    1. Encounter for annual wellness exam in Medicare patient (Primary)    Other orders  -     albuterol sulfate HFA (Proventil HFA) 108 (90 Base) MCG/ACT inhaler; Inhale 2 puffs Every 4 (Four) Hours As Needed for Wheezing.   Dispense: 8 g; Refill: 5      Follow Up:  Return in about 6 months (around 9/12/2021).     An After Visit Summary and PPPS were given to the patient.

## 2021-03-12 NOTE — PATIENT INSTRUCTIONS
Continue your current medications and treatment.    Follow up in the office in 6 months.    Advance Directive    Advance directives are legal documents that let you make choices ahead of time about your health care and medical treatment in case you become unable to communicate for yourself. Advance directives are a way for you to make known your wishes to family, friends, and health care providers. This can let others know about your end-of-life care if you become unable to communicate.  Discussing and writing advance directives should happen over time rather than all at once. Advance directives can be changed depending on your situation and what you want, even after you have signed the advance directives.  There are different types of advance directives, such as:  · Medical power of .  · Living will.  · Do not resuscitate (DNR) or do not attempt resuscitation (DNAR) order.  Health care proxy and medical power of   A health care proxy is also called a health care agent. This is a person who is appointed to make medical decisions for you in cases where you are unable to make the decisions yourself. Generally, people choose someone they know well and trust to represent their preferences. Make sure to ask this person for an agreement to act as your proxy. A proxy may have to exercise judgment in the event of a medical decision for which your wishes are not known.  A medical power of  is a legal document that names your health care proxy. Depending on the laws in your state, after the document is written, it may also need to be:  · Signed.  · Notarized.  · Dated.  · Copied.  · Witnessed.  · Incorporated into your medical record.  You may also want to appoint someone to manage your money in a situation in which you are unable to do so. This is called a durable power of  for finances. It is a separate legal document from the durable power of  for health care. You may choose the same  person or someone different from your health care proxy to act as your agent in money matters.  If you do not appoint a proxy, or if there is a concern that the proxy is not acting in your best interests, a court may appoint a guardian to act on your behalf.  Living will  A living will is a set of instructions that state your wishes about medical care when you cannot express them yourself. Health care providers should keep a copy of your living will in your medical record. You may want to give a copy to family members or friends. To alert caregivers in case of an emergency, you can place a card in your wallet to let them know that you have a living will and where they can find it. A living will is used if you become:  · Terminally ill.  · Disabled.  · Unable to communicate or make decisions.  Items to consider in your living will include:  · To use or not to use life-support equipment, such as dialysis machines and breathing machines (ventilators).  · A DNR or DNAR order. This tells health care providers not to use cardiopulmonary resuscitation (CPR) if breathing or heartbeat stops.  · To use or not to use tube feeding.  · To be given or not to be given food and fluids.  · Comfort (palliative) care when the goal becomes comfort rather than a cure.  · Donation of organs and tissues.  A living will does not give instructions for distributing your money and property if you should pass away.  DNR or DNAR  A DNR or DNAR order is a request not to have CPR in the event that your heart stops beating or you stop breathing. If a DNR or DNAR order has not been made and shared, a health care provider will try to help any patient whose heart has stopped or who has stopped breathing. If you plan to have surgery, talk with your health care provider about how your DNR or DNAR order will be followed if problems occur.  What if I do not have an advance directive?  If you do not have an advance directive, some states assign family  decision makers to act on your behalf based on how closely you are related to them. Each state has its own laws about advance directives. You may want to check with your health care provider, , or state representative about the laws in your state.  Summary  · Advance directives are the legal documents that allow you to make choices ahead of time about your health care and medical treatment in case you become unable to tell others about your care.  · The process of discussing and writing advance directives should happen over time. You can change the advance directives, even after you have signed them.  · Advance directives include DNR or DNAR orders, living armas, and designating an agent as your medical power of .  This information is not intended to replace advice given to you by your health care provider. Make sure you discuss any questions you have with your health care provider.  Document Revised: 07/16/2020 Document Reviewed: 07/16/2020  Elsevier Patient Education © 2020 [employer]

## 2021-04-13 ENCOUNTER — TRANSCRIBE ORDERS (OUTPATIENT)
Dept: ADMINISTRATIVE | Facility: HOSPITAL | Age: 71
End: 2021-04-13

## 2021-04-13 ENCOUNTER — LAB (OUTPATIENT)
Dept: LAB | Facility: HOSPITAL | Age: 71
End: 2021-04-13

## 2021-04-13 DIAGNOSIS — Z00.5 EXAMINATION OF POTENTIAL DONOR OF ORGAN AND TISSUE: Primary | ICD-10-CM

## 2021-04-13 DIAGNOSIS — Z00.5 EXAMINATION OF POTENTIAL DONOR OF ORGAN AND TISSUE: ICD-10-CM

## 2021-04-13 LAB
ABO GROUP BLD: NORMAL
ALBUMIN SERPL-MCNC: 4.3 G/DL (ref 3.5–5.2)
ALBUMIN/GLOB SERPL: 1.5 G/DL
ALP SERPL-CCNC: 67 U/L (ref 39–117)
ALT SERPL W P-5'-P-CCNC: 14 U/L (ref 1–41)
ANION GAP SERPL CALCULATED.3IONS-SCNC: 11.3 MMOL/L (ref 5–15)
AST SERPL-CCNC: 22 U/L (ref 1–40)
BACTERIA UR QL AUTO: NORMAL /HPF
BASOPHILS # BLD AUTO: 0.05 10*3/MM3 (ref 0–0.2)
BASOPHILS NFR BLD AUTO: 1.1 % (ref 0–1.5)
BILIRUB SERPL-MCNC: 0.6 MG/DL (ref 0–1.2)
BILIRUB UR QL STRIP: NEGATIVE
BUN SERPL-MCNC: 19 MG/DL (ref 8–23)
BUN/CREAT SERPL: 18.6 (ref 7–25)
CALCIUM SPEC-SCNC: 9 MG/DL (ref 8.6–10.5)
CHLORIDE SERPL-SCNC: 104 MMOL/L (ref 98–107)
CK SERPL-CCNC: 135 U/L (ref 20–200)
CLARITY UR: CLEAR
CO2 SERPL-SCNC: 24.7 MMOL/L (ref 22–29)
COLOR UR: YELLOW
CREAT SERPL-MCNC: 1.02 MG/DL (ref 0.76–1.27)
D-LACTATE SERPL-SCNC: 0.7 MMOL/L (ref 0.5–2)
DEPRECATED RDW RBC AUTO: 41.7 FL (ref 37–54)
EOSINOPHIL # BLD AUTO: 0.23 10*3/MM3 (ref 0–0.4)
EOSINOPHIL NFR BLD AUTO: 5.3 % (ref 0.3–6.2)
ERYTHROCYTE [DISTWIDTH] IN BLOOD BY AUTOMATED COUNT: 12 % (ref 12.3–15.4)
GFR SERPL CREATININE-BSD FRML MDRD: 72 ML/MIN/1.73
GGT SERPL-CCNC: 9 U/L (ref 8–61)
GLOBULIN UR ELPH-MCNC: 2.9 GM/DL
GLUCOSE SERPL-MCNC: 93 MG/DL (ref 65–99)
GLUCOSE UR STRIP-MCNC: NEGATIVE MG/DL
HBA1C MFR BLD: 5.4 % (ref 3.5–5.6)
HCT VFR BLD AUTO: 40.2 % (ref 37.5–51)
HGB BLD-MCNC: 14 G/DL (ref 13–17.7)
HGB UR QL STRIP.AUTO: ABNORMAL
HYALINE CASTS UR QL AUTO: NORMAL /LPF
IMM GRANULOCYTES # BLD AUTO: 0.01 10*3/MM3 (ref 0–0.05)
IMM GRANULOCYTES NFR BLD AUTO: 0.2 % (ref 0–0.5)
KETONES UR QL STRIP: NEGATIVE
LEUKOCYTE ESTERASE UR QL STRIP.AUTO: NEGATIVE
LYMPHOCYTES # BLD AUTO: 1.33 10*3/MM3 (ref 0.7–3.1)
LYMPHOCYTES NFR BLD AUTO: 30.4 % (ref 19.6–45.3)
MCH RBC QN AUTO: 32.5 PG (ref 26.6–33)
MCHC RBC AUTO-ENTMCNC: 34.8 G/DL (ref 31.5–35.7)
MCV RBC AUTO: 93.3 FL (ref 79–97)
MONOCYTES # BLD AUTO: 0.44 10*3/MM3 (ref 0.1–0.9)
MONOCYTES NFR BLD AUTO: 10.1 % (ref 5–12)
NEUTROPHILS NFR BLD AUTO: 2.31 10*3/MM3 (ref 1.7–7)
NEUTROPHILS NFR BLD AUTO: 52.9 % (ref 42.7–76)
NITRITE UR QL STRIP: NEGATIVE
NRBC BLD AUTO-RTO: 0 /100 WBC (ref 0–0.2)
PH UR STRIP.AUTO: 6 [PH] (ref 5–8)
PHOSPHATE SERPL-MCNC: 3.5 MG/DL (ref 2.5–4.5)
PLATELET # BLD AUTO: 167 10*3/MM3 (ref 140–450)
PMV BLD AUTO: 9.5 FL (ref 6–12)
POTASSIUM SERPL-SCNC: 4.4 MMOL/L (ref 3.5–5.2)
PROT SERPL-MCNC: 7.2 G/DL (ref 6–8.5)
PROT UR QL STRIP: NEGATIVE
RBC # BLD AUTO: 4.31 10*6/MM3 (ref 4.14–5.8)
RBC # UR: NORMAL /HPF
REF LAB TEST METHOD: NORMAL
RH BLD: NEGATIVE
SODIUM SERPL-SCNC: 140 MMOL/L (ref 136–145)
SP GR UR STRIP: 1.02 (ref 1–1.03)
SQUAMOUS #/AREA URNS HPF: NORMAL /HPF
URATE SERPL-MCNC: 6.3 MG/DL (ref 3.4–7)
UROBILINOGEN UR QL STRIP: ABNORMAL
WBC # BLD AUTO: 4.37 10*3/MM3 (ref 3.4–10.8)
WBC UR QL AUTO: NORMAL /HPF

## 2021-04-13 PROCEDURE — 36415 COLL VENOUS BLD VENIPUNCTURE: CPT

## 2021-04-13 PROCEDURE — 83036 HEMOGLOBIN GLYCOSYLATED A1C: CPT

## 2021-04-13 PROCEDURE — 83605 ASSAY OF LACTIC ACID: CPT

## 2021-04-13 PROCEDURE — 85025 COMPLETE CBC W/AUTO DIFF WBC: CPT

## 2021-04-13 PROCEDURE — 86901 BLOOD TYPING SEROLOGIC RH(D): CPT

## 2021-04-13 PROCEDURE — 81001 URINALYSIS AUTO W/SCOPE: CPT

## 2021-04-13 PROCEDURE — 86900 BLOOD TYPING SEROLOGIC ABO: CPT

## 2021-04-13 PROCEDURE — 82977 ASSAY OF GGT: CPT

## 2021-04-13 PROCEDURE — 80053 COMPREHEN METABOLIC PANEL: CPT

## 2021-04-13 PROCEDURE — 84550 ASSAY OF BLOOD/URIC ACID: CPT

## 2021-04-13 PROCEDURE — 82550 ASSAY OF CK (CPK): CPT

## 2021-04-13 PROCEDURE — 84100 ASSAY OF PHOSPHORUS: CPT

## 2021-04-14 ENCOUNTER — LAB (OUTPATIENT)
Dept: LAB | Facility: HOSPITAL | Age: 71
End: 2021-04-14

## 2021-04-14 ENCOUNTER — TRANSCRIBE ORDERS (OUTPATIENT)
Dept: ADMINISTRATIVE | Facility: HOSPITAL | Age: 71
End: 2021-04-14

## 2021-04-14 DIAGNOSIS — Z00.5 EXAMINATION OF POTENTIAL DONOR OF ORGAN AND TISSUE: Primary | ICD-10-CM

## 2021-04-14 DIAGNOSIS — Z00.5 EXAMINATION OF POTENTIAL DONOR OF ORGAN AND TISSUE: ICD-10-CM

## 2021-04-14 LAB
COLLECT DURATION TIME UR: 21 HRS
CREAT CL 24H UR+SERPL-VRATE: 117.5 L/24 HR (ref 139.7–197.3)
CREAT CL 24H UR+SERPL-VRATE: 81.6 ML/MIN (ref 97–137)
CREAT UR-MCNC: 143.6 MG/DL
CREATINE 24H UR-MRATE: 1.58 G/24 HR (ref 1–2.4)
PROT 24H UR-MRATE: 104.5 MG/24HOURS (ref 0–150)
SPECIMEN VOL 24H UR: 1100 ML

## 2021-04-14 PROCEDURE — 84156 ASSAY OF PROTEIN URINE: CPT

## 2021-04-14 PROCEDURE — 81050 URINALYSIS VOLUME MEASURE: CPT

## 2021-04-14 PROCEDURE — 82575 CREATININE CLEARANCE TEST: CPT

## 2021-04-27 ENCOUNTER — LAB (OUTPATIENT)
Dept: LAB | Facility: HOSPITAL | Age: 71
End: 2021-04-27

## 2021-04-27 ENCOUNTER — TRANSCRIBE ORDERS (OUTPATIENT)
Dept: ADMINISTRATIVE | Facility: HOSPITAL | Age: 71
End: 2021-04-27

## 2021-04-27 DIAGNOSIS — Z00.5 EXAMINATION OF POTENTIAL DONOR OF ORGAN AND TISSUE: Primary | ICD-10-CM

## 2021-04-27 DIAGNOSIS — Z00.5 EXAMINATION OF POTENTIAL DONOR OF ORGAN AND TISSUE: ICD-10-CM

## 2021-04-27 LAB
ALBUMIN SERPL-MCNC: 4.4 G/DL (ref 3.5–5.2)
ALBUMIN/GLOB SERPL: 1.6 G/DL
ALP SERPL-CCNC: 71 U/L (ref 39–117)
ALT SERPL W P-5'-P-CCNC: 16 U/L (ref 1–41)
ANION GAP SERPL CALCULATED.3IONS-SCNC: 9.9 MMOL/L (ref 5–15)
AST SERPL-CCNC: 21 U/L (ref 1–40)
BILIRUB SERPL-MCNC: 0.9 MG/DL (ref 0–1.2)
BUN SERPL-MCNC: 18 MG/DL (ref 8–23)
BUN/CREAT SERPL: 15.9 (ref 7–25)
CALCIUM SPEC-SCNC: 9.5 MG/DL (ref 8.6–10.5)
CHLORIDE SERPL-SCNC: 102 MMOL/L (ref 98–107)
CO2 SERPL-SCNC: 27.1 MMOL/L (ref 22–29)
COLLECT DURATION TIME UR: 24 HRS
CREAT SERPL-MCNC: 1.13 MG/DL (ref 0.76–1.27)
CREAT UR-MCNC: 155.3 MG/DL
CREATINE 24H UR-MRATE: 1.71 G/24 HR (ref 1–2.4)
GFR SERPL CREATININE-BSD FRML MDRD: 64 ML/MIN/1.73
GLOBULIN UR ELPH-MCNC: 2.7 GM/DL
GLUCOSE SERPL-MCNC: 89 MG/DL (ref 65–99)
POTASSIUM SERPL-SCNC: 4.4 MMOL/L (ref 3.5–5.2)
PROT 24H UR-MRATE: 121 MG/24HOURS (ref 0–150)
PROT SERPL-MCNC: 7.1 G/DL (ref 6–8.5)
SODIUM SERPL-SCNC: 139 MMOL/L (ref 136–145)
SPECIMEN VOL 24H UR: 1100 ML

## 2021-04-27 PROCEDURE — 80053 COMPREHEN METABOLIC PANEL: CPT

## 2021-04-27 PROCEDURE — 36415 COLL VENOUS BLD VENIPUNCTURE: CPT

## 2021-04-27 PROCEDURE — 82570 ASSAY OF URINE CREATININE: CPT

## 2021-04-27 PROCEDURE — 82610 CYSTATIN C: CPT

## 2021-04-27 PROCEDURE — 81050 URINALYSIS VOLUME MEASURE: CPT

## 2021-04-27 PROCEDURE — 84156 ASSAY OF PROTEIN URINE: CPT

## 2021-04-29 LAB — CYSTATIN C SERPL-MCNC: 0.96 MG/L (ref 0.62–1.16)

## 2021-05-11 RX ORDER — SILDENAFIL 100 MG/1
TABLET, FILM COATED ORAL
Qty: 12 TABLET | Refills: 7 | Status: SHIPPED | OUTPATIENT
Start: 2021-05-11 | End: 2022-06-29

## 2021-09-02 ENCOUNTER — TELEPHONE (OUTPATIENT)
Dept: FAMILY MEDICINE CLINIC | Facility: CLINIC | Age: 71
End: 2021-09-02

## 2021-09-15 ENCOUNTER — OFFICE VISIT (OUTPATIENT)
Dept: FAMILY MEDICINE CLINIC | Facility: CLINIC | Age: 71
End: 2021-09-15

## 2021-09-15 VITALS
HEART RATE: 82 BPM | HEIGHT: 72 IN | WEIGHT: 228 LBS | OXYGEN SATURATION: 97 % | BODY MASS INDEX: 30.88 KG/M2 | SYSTOLIC BLOOD PRESSURE: 132 MMHG | RESPIRATION RATE: 16 BRPM | DIASTOLIC BLOOD PRESSURE: 80 MMHG | TEMPERATURE: 96.8 F

## 2021-09-15 DIAGNOSIS — J45.20 MILD INTERMITTENT ASTHMA WITHOUT COMPLICATION: Primary | Chronic | ICD-10-CM

## 2021-09-15 DIAGNOSIS — J30.89 NON-SEASONAL ALLERGIC RHINITIS DUE TO FUNGAL SPORES: Chronic | ICD-10-CM

## 2021-09-15 DIAGNOSIS — E78.5 HYPERLIPIDEMIA, UNSPECIFIED HYPERLIPIDEMIA TYPE: Chronic | ICD-10-CM

## 2021-09-15 DIAGNOSIS — Z12.5 PROSTATE CANCER SCREENING: ICD-10-CM

## 2021-09-15 DIAGNOSIS — M15.9 PRIMARY OSTEOARTHRITIS INVOLVING MULTIPLE JOINTS: ICD-10-CM

## 2021-09-15 PROBLEM — M15.0 PRIMARY OSTEOARTHRITIS INVOLVING MULTIPLE JOINTS: Status: ACTIVE | Noted: 2021-09-15

## 2021-09-15 PROCEDURE — 99213 OFFICE O/P EST LOW 20 MIN: CPT | Performed by: FAMILY MEDICINE

## 2021-09-15 NOTE — PROGRESS NOTES
"Subjective   Jim Malik is a 71 y.o. male.     Chief Complaint   Patient presents with   • Hyperlipidemia     6 month followup       HPI  Chief complaint: Asthma, hyperlipidemia    Patient is a 71-year-old white male comes in for follow-up and maintenance of his current problems which includes    1. Asthma/allergic rhinitis-stable-patient is currently not using any medication on a regular basis. Patient does use a rescue inhaler as needed. He does use Symbicort 160/4.52 puffs twice a day and Singulair 10 mg once a day as needed.    2. Hyperlipidemia-stable-patient is current on a diet.    3. Osteoarthritis-stable-patient has history of osteoarthritis involving multiple joints but primarily his back. He states he is having increased pain in his lower back and numbness in the lower extremities. Patient denied weakness. Denied bowel or bladder problems. He does not want to have evaluation done at this time.      The following portions of the patient's history were reviewed and updated as appropriate: allergies, current medications, past family history, past medical history, past social history, past surgical history and problem list.    Review of Systems    Objective     /80 (BP Location: Left arm, Patient Position: Sitting, Cuff Size: Adult)   Pulse 82   Temp 96.8 °F (36 °C) (Infrared)   Resp 16   Ht 182.9 cm (72\")   Wt 103 kg (228 lb)   SpO2 97%   BMI 30.92 kg/m²     Physical Exam  Vitals and nursing note reviewed.   Constitutional:       Appearance: He is well-developed.   HENT:      Head: Normocephalic and atraumatic.      Nose: Nose normal.      Mouth/Throat:      Mouth: Mucous membranes are moist.      Pharynx: Oropharynx is clear.   Eyes:      Extraocular Movements: Extraocular movements intact.      Conjunctiva/sclera: Conjunctivae normal.      Pupils: Pupils are equal, round, and reactive to light.   Cardiovascular:      Rate and Rhythm: Normal rate and regular rhythm.      Heart sounds: Normal " heart sounds.   Pulmonary:      Effort: Pulmonary effort is normal.      Breath sounds: Normal breath sounds.   Abdominal:      General: Abdomen is flat. Bowel sounds are normal.      Palpations: Abdomen is soft.   Musculoskeletal:         General: Normal range of motion.      Cervical back: Neck supple.      Comments: - SLR test  Reflexes symmetrical  No tenderness    Full range of motion of the hips   Skin:     General: Skin is warm and dry.   Neurological:      Mental Status: He is alert and oriented to person, place, and time.   Psychiatric:         Behavior: Behavior normal.         Thought Content: Thought content normal.         Judgment: Judgment normal.           Assessment/Plan   Diagnoses and all orders for this visit:    1. Mild intermittent asthma without complication (Primary)    2. Hyperlipidemia, unspecified hyperlipidemia type    3. Non-seasonal allergic rhinitis due to fungal spores    4. Primary osteoarthritis involving multiple joints-we discussed evaluation and treatment options which include x-rays and further laboratory testing.  Patient is to have a PSA.  Patient otherwise will call if his pain gets worse.  He was advised of the red flags to watch for including fever weakness of the lower extremities bowel or bladder problems.    5. Prostate cancer screening  -     PSA Screen; Future      Patient Instructions   Continue your current medications and treatment.    Follow up in the office in 6 months.    Laboratory testing at that time.    Get a colonoscopy.      Naeem Mehta Jr., MD    09/15/21

## 2021-09-15 NOTE — PATIENT INSTRUCTIONS
Continue your current medications and treatment.    Follow up in the office in 6 months.    Laboratory testing at that time.    Get a colonoscopy.

## 2021-09-23 ENCOUNTER — TRANSCRIBE ORDERS (OUTPATIENT)
Dept: ADMINISTRATIVE | Facility: HOSPITAL | Age: 71
End: 2021-09-23

## 2021-09-23 ENCOUNTER — HOSPITAL ENCOUNTER (OUTPATIENT)
Dept: CARDIOLOGY | Facility: HOSPITAL | Age: 71
Discharge: HOME | End: 2021-09-23

## 2021-09-23 ENCOUNTER — LAB (OUTPATIENT)
Dept: LAB | Facility: HOSPITAL | Age: 71
End: 2021-09-23

## 2021-09-23 DIAGNOSIS — Z52.4 ENCOUNTER FOR DONATION OF KIDNEY: ICD-10-CM

## 2021-09-23 DIAGNOSIS — Z52.9 ORGAN DONATION: Primary | ICD-10-CM

## 2021-09-23 DIAGNOSIS — Z12.5 PROSTATE CANCER SCREENING: ICD-10-CM

## 2021-09-23 LAB — PSA SERPL-MCNC: 0.42 NG/ML (ref 0–4)

## 2021-09-23 PROCEDURE — G0103 PSA SCREENING: HCPCS

## 2021-09-23 PROCEDURE — 36415 COLL VENOUS BLD VENIPUNCTURE: CPT

## 2021-09-23 PROCEDURE — 93010 ELECTROCARDIOGRAM REPORT: CPT | Performed by: INTERNAL MEDICINE

## 2021-09-23 PROCEDURE — 93005 ELECTROCARDIOGRAM TRACING: CPT | Performed by: TRANSPLANT SURGERY

## 2021-09-24 DIAGNOSIS — M15.9 PRIMARY OSTEOARTHRITIS INVOLVING MULTIPLE JOINTS: Primary | ICD-10-CM

## 2021-09-24 DIAGNOSIS — M54.16 LUMBAR RADICULOPATHY: ICD-10-CM

## 2021-09-24 LAB — QT INTERVAL: 366 MS

## 2021-09-29 ENCOUNTER — HOSPITAL ENCOUNTER (OUTPATIENT)
Dept: CARDIOLOGY | Facility: HOSPITAL | Age: 71
Discharge: HOME | End: 2021-09-29

## 2021-09-29 ENCOUNTER — HOSPITAL ENCOUNTER (OUTPATIENT)
Dept: GENERAL RADIOLOGY | Facility: HOSPITAL | Age: 71
Discharge: HOME | End: 2021-09-29

## 2021-09-29 VITALS
SYSTOLIC BLOOD PRESSURE: 136 MMHG | WEIGHT: 228 LBS | DIASTOLIC BLOOD PRESSURE: 74 MMHG | BODY MASS INDEX: 30.88 KG/M2 | HEIGHT: 72 IN | HEART RATE: 115 BPM

## 2021-09-29 DIAGNOSIS — Z52.4 ENCOUNTER FOR DONATION OF KIDNEY: ICD-10-CM

## 2021-09-29 DIAGNOSIS — M15.9 PRIMARY OSTEOARTHRITIS INVOLVING MULTIPLE JOINTS: ICD-10-CM

## 2021-09-29 DIAGNOSIS — M54.16 LUMBAR RADICULOPATHY: ICD-10-CM

## 2021-09-29 LAB
BH CV ECHO MEAS - ACS: 2.4 CM
BH CV ECHO MEAS - AO MAX PG (FULL): 2.1 MMHG
BH CV ECHO MEAS - AO MAX PG: 6 MMHG
BH CV ECHO MEAS - AO MEAN PG (FULL): 1.6 MMHG
BH CV ECHO MEAS - AO MEAN PG: 3.1 MMHG
BH CV ECHO MEAS - AO ROOT AREA (BSA CORRECTED): 1.4
BH CV ECHO MEAS - AO ROOT AREA: 7.9 CM^2
BH CV ECHO MEAS - AO ROOT DIAM: 3.2 CM
BH CV ECHO MEAS - AO V2 MAX: 122.8 CM/SEC
BH CV ECHO MEAS - AO V2 MEAN: 83.1 CM/SEC
BH CV ECHO MEAS - AO V2 VTI: 26.2 CM
BH CV ECHO MEAS - ASC AORTA: 3.5 CM
BH CV ECHO MEAS - AVA(I,A): 3.1 CM^2
BH CV ECHO MEAS - AVA(I,D): 3.1 CM^2
BH CV ECHO MEAS - AVA(V,A): 3 CM^2
BH CV ECHO MEAS - AVA(V,D): 3 CM^2
BH CV ECHO MEAS - BSA(HAYCOCK): 2.3 M^2
BH CV ECHO MEAS - BSA: 2.3 M^2
BH CV ECHO MEAS - BZI_BMI: 30.9 KILOGRAMS/M^2
BH CV ECHO MEAS - BZI_METRIC_HEIGHT: 182.9 CM
BH CV ECHO MEAS - BZI_METRIC_WEIGHT: 103.4 KG
BH CV ECHO MEAS - EDV(CUBED): 112.1 ML
BH CV ECHO MEAS - EDV(MOD-SP4): 82.8 ML
BH CV ECHO MEAS - EDV(TEICH): 108.6 ML
BH CV ECHO MEAS - EF(CUBED): 65.7 %
BH CV ECHO MEAS - EF(MOD-BP): 58 %
BH CV ECHO MEAS - EF(MOD-SP4): 57.5 %
BH CV ECHO MEAS - EF(TEICH): 57.1 %
BH CV ECHO MEAS - ESV(CUBED): 38.4 ML
BH CV ECHO MEAS - ESV(MOD-SP4): 35.2 ML
BH CV ECHO MEAS - ESV(TEICH): 46.6 ML
BH CV ECHO MEAS - FS: 30 %
BH CV ECHO MEAS - IVS/LVPW: 1.2
BH CV ECHO MEAS - IVSD: 1.5 CM
BH CV ECHO MEAS - LA DIMENSION(2D): 4.9 CM
BH CV ECHO MEAS - LA DIMENSION: 4.5 CM
BH CV ECHO MEAS - LA/AO: 1.4
BH CV ECHO MEAS - LV DIASTOLIC VOL/BSA (35-75): 36.8 ML/M^2
BH CV ECHO MEAS - LV MASS(C)D: 284.4 GRAMS
BH CV ECHO MEAS - LV MASS(C)DI: 126.3 GRAMS/M^2
BH CV ECHO MEAS - LV MAX PG: 3.9 MMHG
BH CV ECHO MEAS - LV MEAN PG: 1.6 MMHG
BH CV ECHO MEAS - LV SYSTOLIC VOL/BSA (12-30): 15.6 ML/M^2
BH CV ECHO MEAS - LV V1 MAX: 99.3 CM/SEC
BH CV ECHO MEAS - LV V1 MEAN: 55.5 CM/SEC
BH CV ECHO MEAS - LV V1 VTI: 22.1 CM
BH CV ECHO MEAS - LVIDD: 4.8 CM
BH CV ECHO MEAS - LVIDS: 3.4 CM
BH CV ECHO MEAS - LVOT AREA: 3.7 CM^2
BH CV ECHO MEAS - LVOT DIAM: 2.2 CM
BH CV ECHO MEAS - LVPWD: 1.3 CM
BH CV ECHO MEAS - MV A MAX VEL: 63.2 CM/SEC
BH CV ECHO MEAS - MV DEC SLOPE: 389.5 CM/SEC^2
BH CV ECHO MEAS - MV DEC TIME: 0.19 SEC
BH CV ECHO MEAS - MV E MAX VEL: 74 CM/SEC
BH CV ECHO MEAS - MV E/A: 1.2
BH CV ECHO MEAS - MV MAX PG: 2.6 MMHG
BH CV ECHO MEAS - MV MEAN PG: 1.2 MMHG
BH CV ECHO MEAS - MV V2 MAX: 80.8 CM/SEC
BH CV ECHO MEAS - MV V2 MEAN: 50.5 CM/SEC
BH CV ECHO MEAS - MV V2 VTI: 22.9 CM
BH CV ECHO MEAS - MVA(VTI): 3.5 CM^2
BH CV ECHO MEAS - PA ACC TIME: 0.12 SEC
BH CV ECHO MEAS - PA MAX PG (FULL): 3 MMHG
BH CV ECHO MEAS - PA MAX PG: 4.8 MMHG
BH CV ECHO MEAS - PA MEAN PG (FULL): 1.6 MMHG
BH CV ECHO MEAS - PA MEAN PG: 2.4 MMHG
BH CV ECHO MEAS - PA PR(ACCEL): 26.1 MMHG
BH CV ECHO MEAS - PA V2 MAX: 109.4 CM/SEC
BH CV ECHO MEAS - PA V2 MEAN: 71.4 CM/SEC
BH CV ECHO MEAS - PA V2 VTI: 23.7 CM
BH CV ECHO MEAS - PULM A REVS DUR: 0.06 SEC
BH CV ECHO MEAS - PULM A REVS VEL: 36.1 CM/SEC
BH CV ECHO MEAS - PULM DIAS VEL: 36.2 CM/SEC
BH CV ECHO MEAS - PULM S/D: 2
BH CV ECHO MEAS - PULM SYS VEL: 73.1 CM/SEC
BH CV ECHO MEAS - RV MAX PG: 1.8 MMHG
BH CV ECHO MEAS - RV MEAN PG: 0.81 MMHG
BH CV ECHO MEAS - RV V1 MAX: 66.2 CM/SEC
BH CV ECHO MEAS - RV V1 MEAN: 41.6 CM/SEC
BH CV ECHO MEAS - RV V1 VTI: 14 CM
BH CV ECHO MEAS - RVDD: 3.2 CM
BH CV ECHO MEAS - SI(AO): 91.4 ML/M^2
BH CV ECHO MEAS - SI(CUBED): 32.7 ML/M^2
BH CV ECHO MEAS - SI(LVOT): 35.8 ML/M^2
BH CV ECHO MEAS - SI(MOD-SP4): 21.1 ML/M^2
BH CV ECHO MEAS - SI(TEICH): 27.6 ML/M^2
BH CV ECHO MEAS - SV(AO): 205.8 ML
BH CV ECHO MEAS - SV(CUBED): 73.7 ML
BH CV ECHO MEAS - SV(LVOT): 80.6 ML
BH CV ECHO MEAS - SV(MOD-SP4): 47.6 ML
BH CV ECHO MEAS - SV(TEICH): 62.1 ML

## 2021-09-29 PROCEDURE — 72110 X-RAY EXAM L-2 SPINE 4/>VWS: CPT

## 2021-09-29 PROCEDURE — 93306 TTE W/DOPPLER COMPLETE: CPT | Performed by: INTERNAL MEDICINE

## 2021-09-29 PROCEDURE — 93306 TTE W/DOPPLER COMPLETE: CPT

## 2021-10-07 ENCOUNTER — LAB (OUTPATIENT)
Dept: LAB | Facility: HOSPITAL | Age: 71
End: 2021-10-07

## 2021-10-07 ENCOUNTER — TRANSCRIBE ORDERS (OUTPATIENT)
Dept: ADMINISTRATIVE | Facility: HOSPITAL | Age: 71
End: 2021-10-07

## 2021-10-07 DIAGNOSIS — Z00.5 EXAMINATION OF POTENTIAL DONOR OF ORGAN AND TISSUE: ICD-10-CM

## 2021-10-07 DIAGNOSIS — Z00.5 EXAMINATION OF POTENTIAL DONOR OF ORGAN AND TISSUE: Primary | ICD-10-CM

## 2021-10-07 LAB
BILIRUB UR QL STRIP: NEGATIVE
CLARITY UR: CLEAR
COLOR UR: YELLOW
GLUCOSE UR STRIP-MCNC: NEGATIVE MG/DL
HGB UR QL STRIP.AUTO: NEGATIVE
KETONES UR QL STRIP: NEGATIVE
LEUKOCYTE ESTERASE UR QL STRIP.AUTO: NEGATIVE
NITRITE UR QL STRIP: NEGATIVE
PH UR STRIP.AUTO: 6 [PH] (ref 5–8)
PROT UR QL STRIP: NEGATIVE
SP GR UR STRIP: 1.02 (ref 1–1.03)
UROBILINOGEN UR QL STRIP: NORMAL

## 2021-10-07 PROCEDURE — 81003 URINALYSIS AUTO W/O SCOPE: CPT

## 2021-10-11 ENCOUNTER — ON CAMPUS - OUTPATIENT (OUTPATIENT)
Dept: URBAN - METROPOLITAN AREA HOSPITAL 85 | Facility: HOSPITAL | Age: 71
End: 2021-10-11
Payer: COMMERCIAL

## 2021-10-11 ENCOUNTER — HOSPITAL ENCOUNTER (OUTPATIENT)
Facility: HOSPITAL | Age: 71
Setting detail: HOSPITAL OUTPATIENT SURGERY
Discharge: HOME | End: 2021-10-11
Attending: INTERNAL MEDICINE | Admitting: INTERNAL MEDICINE

## 2021-10-11 ENCOUNTER — ANESTHESIA EVENT (OUTPATIENT)
Dept: GASTROENTEROLOGY | Facility: HOSPITAL | Age: 71
End: 2021-10-11

## 2021-10-11 ENCOUNTER — ANESTHESIA (OUTPATIENT)
Dept: GASTROENTEROLOGY | Facility: HOSPITAL | Age: 71
End: 2021-10-11

## 2021-10-11 VITALS — HEART RATE: 75 BPM | SYSTOLIC BLOOD PRESSURE: 86 MMHG | DIASTOLIC BLOOD PRESSURE: 59 MMHG | OXYGEN SATURATION: 96 %

## 2021-10-11 VITALS
DIASTOLIC BLOOD PRESSURE: 87 MMHG | SYSTOLIC BLOOD PRESSURE: 120 MMHG | BODY MASS INDEX: 31.03 KG/M2 | OXYGEN SATURATION: 95 % | WEIGHT: 229.06 LBS | RESPIRATION RATE: 10 BRPM | HEIGHT: 72 IN | TEMPERATURE: 98.7 F | HEART RATE: 70 BPM

## 2021-10-11 DIAGNOSIS — Z12.11 ENCOUNTER FOR SCREENING FOR MALIGNANT NEOPLASM OF COLON: ICD-10-CM

## 2021-10-11 DIAGNOSIS — Z12.11 SCREENING FOR COLON CANCER: ICD-10-CM

## 2021-10-11 DIAGNOSIS — Z00.5: ICD-10-CM

## 2021-10-11 DIAGNOSIS — K62.1 RECTAL POLYP: ICD-10-CM

## 2021-10-11 DIAGNOSIS — K57.30 DIVERTICULOSIS OF LARGE INTESTINE WITHOUT PERFORATION OR ABS: ICD-10-CM

## 2021-10-11 PROCEDURE — 88305 TISSUE EXAM BY PATHOLOGIST: CPT | Performed by: INTERNAL MEDICINE

## 2021-10-11 PROCEDURE — 45385 COLONOSCOPY W/LESION REMOVAL: CPT | Mod: 33 | Performed by: INTERNAL MEDICINE

## 2021-10-11 PROCEDURE — 25010000002 PROPOFOL 10 MG/ML EMULSION

## 2021-10-11 RX ORDER — PROMETHAZINE HYDROCHLORIDE 25 MG/1
25 TABLET ORAL ONCE AS NEEDED
Status: DISCONTINUED | OUTPATIENT
Start: 2021-10-11 | End: 2021-10-11 | Stop reason: HOSPADM

## 2021-10-11 RX ORDER — ONDANSETRON 2 MG/ML
4 INJECTION INTRAMUSCULAR; INTRAVENOUS ONCE AS NEEDED
Status: DISCONTINUED | OUTPATIENT
Start: 2021-10-11 | End: 2021-10-11 | Stop reason: HOSPADM

## 2021-10-11 RX ORDER — FLUMAZENIL 0.1 MG/ML
0.2 INJECTION INTRAVENOUS AS NEEDED
Status: DISCONTINUED | OUTPATIENT
Start: 2021-10-11 | End: 2021-10-11 | Stop reason: HOSPADM

## 2021-10-11 RX ORDER — DIPHENHYDRAMINE HYDROCHLORIDE 50 MG/ML
12.5 INJECTION INTRAMUSCULAR; INTRAVENOUS
Status: DISCONTINUED | OUTPATIENT
Start: 2021-10-11 | End: 2021-10-11 | Stop reason: HOSPADM

## 2021-10-11 RX ORDER — ACETAMINOPHEN 650 MG/1
650 SUPPOSITORY RECTAL ONCE AS NEEDED
Status: DISCONTINUED | OUTPATIENT
Start: 2021-10-11 | End: 2021-10-11 | Stop reason: HOSPADM

## 2021-10-11 RX ORDER — DEXAMETHASONE SODIUM PHOSPHATE 4 MG/ML
8 INJECTION, SOLUTION INTRA-ARTICULAR; INTRALESIONAL; INTRAMUSCULAR; INTRAVENOUS; SOFT TISSUE ONCE AS NEEDED
Status: DISCONTINUED | OUTPATIENT
Start: 2021-10-11 | End: 2021-10-11 | Stop reason: HOSPADM

## 2021-10-11 RX ORDER — SODIUM CHLORIDE 9 MG/ML
100 INJECTION, SOLUTION INTRAVENOUS CONTINUOUS
Status: DISCONTINUED | OUTPATIENT
Start: 2021-10-11 | End: 2021-10-11 | Stop reason: HOSPADM

## 2021-10-11 RX ORDER — SODIUM CHLORIDE 9 MG/ML
INJECTION, SOLUTION INTRAVENOUS CONTINUOUS PRN
Status: DISCONTINUED | OUTPATIENT
Start: 2021-10-11 | End: 2021-10-11 | Stop reason: SURG

## 2021-10-11 RX ORDER — PROPOFOL 10 MG/ML
VIAL (ML) INTRAVENOUS AS NEEDED
Status: DISCONTINUED | OUTPATIENT
Start: 2021-10-11 | End: 2021-10-11 | Stop reason: SURG

## 2021-10-11 RX ORDER — ACETAMINOPHEN 325 MG/1
650 TABLET ORAL ONCE AS NEEDED
Status: DISCONTINUED | OUTPATIENT
Start: 2021-10-11 | End: 2021-10-11 | Stop reason: HOSPADM

## 2021-10-11 RX ORDER — LIDOCAINE HYDROCHLORIDE 10 MG/ML
INJECTION, SOLUTION EPIDURAL; INFILTRATION; INTRACAUDAL; PERINEURAL AS NEEDED
Status: DISCONTINUED | OUTPATIENT
Start: 2021-10-11 | End: 2021-10-11 | Stop reason: SURG

## 2021-10-11 RX ORDER — PROMETHAZINE HYDROCHLORIDE 25 MG/1
25 SUPPOSITORY RECTAL ONCE AS NEEDED
Status: DISCONTINUED | OUTPATIENT
Start: 2021-10-11 | End: 2021-10-11 | Stop reason: HOSPADM

## 2021-10-11 RX ORDER — NALOXONE HCL 0.4 MG/ML
0.4 VIAL (ML) INJECTION AS NEEDED
Status: DISCONTINUED | OUTPATIENT
Start: 2021-10-11 | End: 2021-10-11 | Stop reason: HOSPADM

## 2021-10-11 RX ADMIN — PROPOFOL 80 MG: 10 INJECTION, EMULSION INTRAVENOUS at 10:13

## 2021-10-11 RX ADMIN — SODIUM CHLORIDE 100 ML/HR: 9 INJECTION, SOLUTION INTRAVENOUS at 09:51

## 2021-10-11 RX ADMIN — PROPOFOL 100 MCG/KG/MIN: 10 INJECTION, EMULSION INTRAVENOUS at 10:13

## 2021-10-11 RX ADMIN — LIDOCAINE HYDROCHLORIDE 60 MG: 10 INJECTION, SOLUTION EPIDURAL; INFILTRATION; INTRACAUDAL; PERINEURAL at 10:13

## 2021-10-11 RX ADMIN — SODIUM CHLORIDE: 0.9 INJECTION, SOLUTION INTRAVENOUS at 10:12

## 2021-10-11 NOTE — OP NOTE
COLONOSCOPY Procedure Report    Patient Name:  Jim Malik  YOB: 1950    Date of Surgery:  10/11/2021     Pre-Op Diagnosis:  Screening for colon cancer [Z12.11] #3  Preop eval for donation of kidney to his sister for renal transplant    Post-Op Diagnosis:     Post-Op Diagnosis Codes:     * Screening for colon cancer [Z12.11] #3  Mild L diverticulosis  Small rectal polyps X 2 (3-4 mm, cold snared for path): HP  Normal colonoscopy and ileoscopy otherwise  Colon repeat 7 years  OK with kidney transplantation donation to sister    Procedure/CPT® Codes:      Procedure(s):  COLONOSCOPY with snare polypectomy    Staff:  Surgeon(s):  Radha Leon MD         Anesthesia: Monitored Anesthesia Care    Implants:    Nothing was implanted during the procedure    Specimen:        See below    Complications:  NONE    EBL = NONE    Description of Procedure:  Informed consent was obtained for the procedure, including sedation.  Risks of perforation, hemorrhage, adverse drug reaction and aspiration were discussed.  The patient was brought into the endoscopy suite. Continuous cardiopulmonary monitoring was performed.  The patient was placed in the left lateral decubitus position. After adequate sedation was attained, the digital rectal exam was performed which was NORMAL.  Subsequently, the Olympus colonoscope was inserted into the patient's rectum and advanced to the level of the cecum and terminal ileum NORMAL without difficulty.  The bowel prep was GOOD.  Circumferential examination of the patient's colon was performed on scope withdrawal.  The cecum, ascending colon, and hepatic flexure were examined twice.  The transverse colon, splenic flexure, descending colon, and rectum were examined.  A retroflex exam was performed in the rectum which was NORMAL.  The bowel was decompressed, the scope was withdrawn from the patient, and the patient tolerated the procedure well. There were no immediate post-operative  complications.     Findings:   Terminal ileum: NORMAL  Colon:    * Screening for colon cancer [Z12.11] #3  Mild L diverticulosis  Small rectal polyps X 2 (3-4 mm, cold snared for path): HP  Normal colonoscopy and ileoscopy otherwise  Colon repeat 7 years  OK with kidney transplantation donation to sister    Impression:     * Screening for colon cancer [Z12.11] #3  Mild L diverticulosis  Small rectal polyps X 2 (3-4 mm, cold snared for path): HP  Normal colonoscopy and ileoscopy otherwise  Colon repeat 7 years  OK with kidney transplantation donation to sister    Recommendations:  Colon 7 years  Proceed with renal donation to his sister transplantation    Kim MD     Date: 10/11/2021  Time: 10:11 EDT

## 2021-10-11 NOTE — ANESTHESIA POSTPROCEDURE EVALUATION
Patient: Jim Malik    Procedure Summary     Date: 10/11/21 Room / Location: [facility] ENDOSCOPY 4 / [facility] ENDOSCOPY    Anesthesia Start: 1012 Anesthesia Stop: 1041    Procedure: COLONOSCOPY (N/A ) Diagnosis:       Screening for colon cancer      (Screening for colon cancer [Z12.11])    Surgeons: Radha Leon MD Provider: George Cruz MD    Anesthesia Type: MAC ASA Status: 2          Anesthesia Type: MAC    Vitals  Vitals Value Taken Time   BP 86/59 10/11/21 1033   Temp     Pulse 75 10/11/21 1035   Resp     SpO2 96 % 10/11/21 1035           Post Anesthesia Care and Evaluation    Patient location during evaluation: PACU  Patient participation: complete - patient participated  Level of consciousness: awake  Pain scale: See nurse's notes for pain score.  Pain management: adequate  Airway patency: patent  Anesthetic complications: No anesthetic complications  PONV Status: none  Cardiovascular status: acceptable  Respiratory status: acceptable  Hydration status: acceptable    Comments: Patient seen and examined postoperatively; vital signs stable; SpO2 greater than or equal to 90%; cardiopulmonary status stable; nausea/vomiting adequately controlled; pain adequately controlled; no apparent anesthesia complications; patient discharged from anesthesia care when discharge criteria were met

## 2021-10-11 NOTE — ANESTHESIA PREPROCEDURE EVALUATION
Anesthesia Evaluation     Patient summary reviewed and Nursing notes reviewed   NPO Solid Status: > 8 hours  NPO Liquid Status: > 2 hours           Airway   Mallampati: II  TM distance: >3 FB  Neck ROM: full  No difficulty expected  Dental - normal exam     Pulmonary    (+) asthma,  Cardiovascular     (+) hyperlipidemia,       Neuro/Psych  GI/Hepatic/Renal/Endo      Musculoskeletal     Abdominal    Substance History      OB/GYN          Other   arthritis,    history of cancer                    Anesthesia Plan    ASA 2     MAC   total IV anesthesia  intravenous induction     Anesthetic plan, all risks, benefits, and alternatives have been provided, discussed and informed consent has been obtained with: patient.    Plan discussed with CRNA and CAA.

## 2021-10-11 NOTE — H&P
" LOS: 0 days   Patient Care Team:  Naeem Mehta Jr., MD as PCP - General (Family Medicine)      Subjective     Interval History:     Subjective: Outpt SCREENING COLON scope  No GI complaints  No chest pains or SOA  No fever or chills  No pain complaints.  No contraindications for MAC anesthesia    ROS:   No chest pain, shortness of breath, or cough. Agreeable to scope and anesthesia  No change since scanned H&P       Medication Review:     Current Facility-Administered Medications:   •  acetaminophen (TYLENOL) tablet 650 mg, 650 mg, Oral, Once PRN **OR** acetaminophen (TYLENOL) suppository 650 mg, 650 mg, Rectal, Once PRN, Czupil, Srinath, AA  •  dexamethasone (DECADRON) injection 8 mg, 8 mg, Intravenous, Once PRN, Czupil, Srinath, AA  •  diphenhydrAMINE (BENADRYL) injection 12.5 mg, 12.5 mg, Intravenous, Q15 Min PRN, Czupil, Srinath, AA  •  flumazenil (ROMAZICON) injection 0.2 mg, 0.2 mg, Intravenous, PRN, Czupil, Srinath, AA  •  lidocaine (cardiac) (XYLOCAINE) injection 100 mg, 100 mg, Intravenous, Q5 Min PRN, Czupil, Srinath, AA  •  naloxone (NARCAN) injection 0.4 mg, 0.4 mg, Intravenous, PRN, Czupil, Srinath, AA  •  ondansetron (ZOFRAN) injection 4 mg, 4 mg, Intravenous, Once PRN, Czupil, Srinath, AA  •  promethazine (PHENERGAN) suppository 25 mg, 25 mg, Rectal, Once PRN **OR** promethazine (PHENERGAN) tablet 25 mg, 25 mg, Oral, Once PRN, Czupil, Srinath, AA  •  sodium chloride 0.9 % infusion, 100 mL/hr, Intravenous, Continuous, Czupil, Srinath, AA      Objective     Vital Signs  Vitals:    10/08/21 1143 10/11/21 0937   BP:  141/88   BP Location:  Left arm   Patient Position:  Sitting   Pulse:  84   Resp:  15   Temp:  98.7 °F (37.1 °C)   TempSrc:  Oral   SpO2:  97%   Weight: 103 kg (228 lb) 104 kg (229 lb 0.9 oz)   Height: 182.9 cm (72\") 182.9 cm (72\")       Physical Exam:    General Appearance:    Awake and alert, in no acute distress   Head:    Normocephalic, without obvious abnormality   Eyes:          Conjunctivae " normal, anicteric sclera   Throat:   No oral lesions, no thrush, oral mucosa moist   Neck:   No adenopathy, supple, no JVD   Lungs:     respirations regular, even and unlabored   Abdomen:     Soft, non-tender, no rebound or guarding, non-distended, no hepatosplenomegaly   Rectal:     Deferred   Extremities:   No edema, no cyanosis   Skin:   No bruising or rash, no jaundice        Results Review:    Lab Results (last 24 hours)     ** No results found for the last 24 hours. **          Imaging Results (Last 24 Hours)     ** No results found for the last 24 hours. **            Assessment/Plan   Proceed with scope and MAC anesthesia    No change from office records    Kim MD  10/11/21  09:51 EDT

## 2021-10-11 NOTE — DISCHARGE INSTRUCTIONS
A responsible adult should stay with you and you should rest quietly for the rest of the day.    Do not drink alcohol, drive, operate any heavy machinery or power tools or make any legal/important decisions for the next 24 hours.     Progress your diet as tolerated.  If you begin to experience severe pain, increased shortness of breath, racing heartbeat or a fever above 101 F, seek immediate medical attention.     Follow up with MD as instructed. Call office for results in 3 to 5 days if needed.    Colon 7 years  Proceed with renal donation to his sister transplantation

## 2021-10-12 LAB
LAB AP CASE REPORT: NORMAL
PATH REPORT.FINAL DX SPEC: NORMAL
PATH REPORT.GROSS SPEC: NORMAL

## 2021-10-20 ENCOUNTER — TRANSCRIBE ORDERS (OUTPATIENT)
Dept: ADMINISTRATIVE | Facility: HOSPITAL | Age: 71
End: 2021-10-20

## 2021-10-20 DIAGNOSIS — Z01.818 PRE-OPERATIVE CLEARANCE: Primary | ICD-10-CM

## 2021-10-28 ENCOUNTER — HOSPITAL ENCOUNTER (OUTPATIENT)
Dept: MRI IMAGING | Facility: HOSPITAL | Age: 71
Discharge: HOME | End: 2021-10-28
Admitting: FAMILY MEDICINE

## 2021-10-28 ENCOUNTER — HOSPITAL ENCOUNTER (OUTPATIENT)
Dept: RESPIRATORY THERAPY | Facility: HOSPITAL | Age: 71
Discharge: HOME | End: 2021-10-28
Admitting: TRANSPLANT SURGERY

## 2021-10-28 ENCOUNTER — APPOINTMENT (OUTPATIENT)
Dept: MRI IMAGING | Facility: HOSPITAL | Age: 71
End: 2021-10-28

## 2021-10-28 DIAGNOSIS — Z01.818 PRE-OPERATIVE CLEARANCE: ICD-10-CM

## 2021-10-28 DIAGNOSIS — M54.16 LUMBAR RADICULOPATHY: Primary | ICD-10-CM

## 2021-10-28 DIAGNOSIS — M54.16 LUMBAR RADICULOPATHY: ICD-10-CM

## 2021-10-28 DIAGNOSIS — M15.9 PRIMARY OSTEOARTHRITIS INVOLVING MULTIPLE JOINTS: ICD-10-CM

## 2021-10-28 LAB
BH CV STRESS BP STAGE 1: NORMAL
BH CV STRESS BP STAGE 2: NORMAL
BH CV STRESS BP STAGE 3: NORMAL
BH CV STRESS DURATION MIN STAGE 1: 3
BH CV STRESS DURATION MIN STAGE 2: 3
BH CV STRESS DURATION MIN STAGE 3: 3
BH CV STRESS DURATION SEC STAGE 1: 0
BH CV STRESS DURATION SEC STAGE 2: 0
BH CV STRESS DURATION SEC STAGE 3: 0
BH CV STRESS GRADE STAGE 1: 10
BH CV STRESS GRADE STAGE 2: 12
BH CV STRESS GRADE STAGE 3: 14
BH CV STRESS HR STAGE 1: 122
BH CV STRESS HR STAGE 2: 139
BH CV STRESS HR STAGE 3: 146
BH CV STRESS METS STAGE 1: 4.6
BH CV STRESS METS STAGE 2: 7
BH CV STRESS METS STAGE 3: 10.1
BH CV STRESS PROTOCOL 1: NORMAL
BH CV STRESS RECOVERY BP: NORMAL MMHG
BH CV STRESS RECOVERY HR: 96 BPM
BH CV STRESS SPEED STAGE 1: 1.7
BH CV STRESS SPEED STAGE 2: 2.5
BH CV STRESS SPEED STAGE 3: 3.4
BH CV STRESS STAGE 1: 1
BH CV STRESS STAGE 2: 2
BH CV STRESS STAGE 3: 3
CREAT BLDA-MCNC: 1.1 MG/DL (ref 0.6–1.3)
MAXIMAL PREDICTED HEART RATE: 150 BPM
PERCENT MAX PREDICTED HR: 97.33 %
STRESS BASELINE BP: NORMAL MMHG
STRESS BASELINE HR: 88 BPM
STRESS PERCENT HR: 115 %
STRESS POST ESTIMATED WORKLOAD: 10.1 METS
STRESS POST EXERCISE DUR MIN: 7 MIN
STRESS POST PEAK BP: NORMAL MMHG
STRESS POST PEAK HR: 146 BPM
STRESS TARGET HR: 128 BPM

## 2021-10-28 PROCEDURE — 93017 CV STRESS TEST TRACING ONLY: CPT

## 2021-10-28 PROCEDURE — 93018 CV STRESS TEST I&R ONLY: CPT | Performed by: INTERNAL MEDICINE

## 2021-10-28 PROCEDURE — 82565 ASSAY OF CREATININE: CPT

## 2021-10-28 PROCEDURE — 72158 MRI LUMBAR SPINE W/O & W/DYE: CPT

## 2021-10-28 PROCEDURE — 25010000002 GADOTERIDOL PER 1 ML: Performed by: FAMILY MEDICINE

## 2021-10-28 PROCEDURE — 93016 CV STRESS TEST SUPVJ ONLY: CPT | Performed by: INTERNAL MEDICINE

## 2021-10-28 PROCEDURE — A9579 GAD-BASE MR CONTRAST NOS,1ML: HCPCS | Performed by: FAMILY MEDICINE

## 2021-10-28 RX ADMIN — GADOTERIDOL 20 ML: 279.3 INJECTION, SOLUTION INTRAVENOUS at 07:45

## 2021-11-24 DIAGNOSIS — D49.0 IPMN (INTRADUCTAL PAPILLARY MUCINOUS NEOPLASM): Primary | ICD-10-CM

## 2021-11-29 DIAGNOSIS — D49.0 IPMN (INTRADUCTAL PAPILLARY MUCINOUS NEOPLASM): Primary | ICD-10-CM

## 2021-12-08 ENCOUNTER — OFFICE (OUTPATIENT)
Dept: URBAN - METROPOLITAN AREA CLINIC 64 | Facility: CLINIC | Age: 71
End: 2021-12-08

## 2021-12-08 VITALS
HEART RATE: 77 BPM | HEIGHT: 72 IN | WEIGHT: 242 LBS | SYSTOLIC BLOOD PRESSURE: 163 MMHG | DIASTOLIC BLOOD PRESSURE: 107 MMHG

## 2021-12-08 DIAGNOSIS — K86.2 CYST OF PANCREAS: ICD-10-CM

## 2021-12-08 DIAGNOSIS — R93.5 ABNORMAL FINDINGS ON DIAGNOSTIC IMAGING OF OTHER ABDOMINAL R: ICD-10-CM

## 2021-12-08 PROCEDURE — 99213 OFFICE O/P EST LOW 20 MIN: CPT | Performed by: NURSE PRACTITIONER

## 2021-12-29 ENCOUNTER — ANESTHESIA EVENT (OUTPATIENT)
Dept: GASTROENTEROLOGY | Facility: HOSPITAL | Age: 71
End: 2021-12-29

## 2021-12-29 ENCOUNTER — LAB (OUTPATIENT)
Dept: LAB | Facility: HOSPITAL | Age: 71
End: 2021-12-29

## 2021-12-29 LAB — SARS-COV-2 ORF1AB RESP QL NAA+PROBE: NOT DETECTED

## 2021-12-29 PROCEDURE — U0004 COV-19 TEST NON-CDC HGH THRU: HCPCS

## 2021-12-29 PROCEDURE — U0005 INFEC AGEN DETEC AMPLI PROBE: HCPCS

## 2021-12-29 PROCEDURE — C9803 HOPD COVID-19 SPEC COLLECT: HCPCS

## 2021-12-30 ENCOUNTER — HOSPITAL ENCOUNTER (OUTPATIENT)
Facility: HOSPITAL | Age: 71
Setting detail: HOSPITAL OUTPATIENT SURGERY
Discharge: HOME | End: 2021-12-30
Attending: INTERNAL MEDICINE | Admitting: INTERNAL MEDICINE

## 2021-12-30 ENCOUNTER — ON CAMPUS - OUTPATIENT (OUTPATIENT)
Dept: URBAN - METROPOLITAN AREA HOSPITAL 85 | Facility: HOSPITAL | Age: 71
End: 2021-12-30
Payer: COMMERCIAL

## 2021-12-30 ENCOUNTER — ANESTHESIA (OUTPATIENT)
Dept: GASTROENTEROLOGY | Facility: HOSPITAL | Age: 71
End: 2021-12-30

## 2021-12-30 VITALS
DIASTOLIC BLOOD PRESSURE: 90 MMHG | SYSTOLIC BLOOD PRESSURE: 136 MMHG | HEART RATE: 64 BPM | RESPIRATION RATE: 13 BRPM | HEIGHT: 72 IN | WEIGHT: 225 LBS | BODY MASS INDEX: 30.48 KG/M2 | TEMPERATURE: 98.2 F | OXYGEN SATURATION: 98 %

## 2021-12-30 DIAGNOSIS — K86.2 PANCREATIC CYST: ICD-10-CM

## 2021-12-30 DIAGNOSIS — K86.2 CYST OF PANCREAS: ICD-10-CM

## 2021-12-30 LAB — AMYLASE FLD-CCNC: 31 U/L

## 2021-12-30 PROCEDURE — 82150 ASSAY OF AMYLASE: CPT | Performed by: INTERNAL MEDICINE

## 2021-12-30 PROCEDURE — 25010000002 LEVOFLOXACIN PER 250 MG: Performed by: ANESTHESIOLOGY

## 2021-12-30 PROCEDURE — 43242 EGD US FINE NEEDLE BX/ASPIR: CPT | Performed by: INTERNAL MEDICINE

## 2021-12-30 PROCEDURE — 25010000002 PROPOFOL 200 MG/20ML EMULSION: Performed by: ANESTHESIOLOGY

## 2021-12-30 RX ORDER — SODIUM CHLORIDE 0.9 % (FLUSH) 0.9 %
10 SYRINGE (ML) INJECTION AS NEEDED
Status: DISCONTINUED | OUTPATIENT
Start: 2021-12-30 | End: 2021-12-30 | Stop reason: HOSPADM

## 2021-12-30 RX ORDER — LEVOFLOXACIN 5 MG/ML
INJECTION, SOLUTION INTRAVENOUS AS NEEDED
Status: DISCONTINUED | OUTPATIENT
Start: 2021-12-30 | End: 2021-12-30 | Stop reason: SURG

## 2021-12-30 RX ORDER — LIDOCAINE HYDROCHLORIDE 20 MG/ML
INJECTION, SOLUTION EPIDURAL; INFILTRATION; INTRACAUDAL; PERINEURAL AS NEEDED
Status: DISCONTINUED | OUTPATIENT
Start: 2021-12-30 | End: 2021-12-30 | Stop reason: SURG

## 2021-12-30 RX ORDER — LEVOFLOXACIN 5 MG/ML
INJECTION, SOLUTION INTRAVENOUS
Status: COMPLETED
Start: 2021-12-30 | End: 2021-12-30

## 2021-12-30 RX ORDER — ONDANSETRON 2 MG/ML
4 INJECTION INTRAMUSCULAR; INTRAVENOUS ONCE AS NEEDED
Status: DISCONTINUED | OUTPATIENT
Start: 2021-12-30 | End: 2021-12-30 | Stop reason: HOSPADM

## 2021-12-30 RX ORDER — SODIUM CHLORIDE 0.9 % (FLUSH) 0.9 %
10 SYRINGE (ML) INJECTION EVERY 12 HOURS SCHEDULED
Status: DISCONTINUED | OUTPATIENT
Start: 2021-12-30 | End: 2021-12-30 | Stop reason: HOSPADM

## 2021-12-30 RX ORDER — SODIUM CHLORIDE, SODIUM LACTATE, POTASSIUM CHLORIDE, AND CALCIUM CHLORIDE .6; .31; .03; .02 G/100ML; G/100ML; G/100ML; G/100ML
20 INJECTION, SOLUTION INTRAVENOUS CONTINUOUS
Status: DISCONTINUED | OUTPATIENT
Start: 2021-12-30 | End: 2021-12-30 | Stop reason: HOSPADM

## 2021-12-30 RX ORDER — SODIUM CHLORIDE, SODIUM LACTATE, POTASSIUM CHLORIDE, AND CALCIUM CHLORIDE .6; .31; .03; .02 G/100ML; G/100ML; G/100ML; G/100ML
IRRIGANT IRRIGATION ONCE
Status: DISCONTINUED | OUTPATIENT
Start: 2021-12-30 | End: 2021-12-30

## 2021-12-30 RX ORDER — METRONIDAZOLE 500 MG/1
TABLET ORAL
Status: COMPLETED
Start: 2021-12-30 | End: 2021-12-30

## 2021-12-30 RX ORDER — PROPOFOL 10 MG/ML
INJECTION, EMULSION INTRAVENOUS AS NEEDED
Status: DISCONTINUED | OUTPATIENT
Start: 2021-12-30 | End: 2021-12-30 | Stop reason: SURG

## 2021-12-30 RX ORDER — LIDOCAINE HYDROCHLORIDE 10 MG/ML
0.5 INJECTION, SOLUTION EPIDURAL; INFILTRATION; INTRACAUDAL; PERINEURAL ONCE AS NEEDED
Status: DISCONTINUED | OUTPATIENT
Start: 2021-12-30 | End: 2021-12-30 | Stop reason: HOSPADM

## 2021-12-30 RX ORDER — SODIUM CHLORIDE 9 MG/ML
9 INJECTION, SOLUTION INTRAVENOUS CONTINUOUS PRN
Status: DISCONTINUED | OUTPATIENT
Start: 2021-12-30 | End: 2021-12-30 | Stop reason: HOSPADM

## 2021-12-30 RX ADMIN — LEVOFLOXACIN 500 MG: 500 INJECTION, SOLUTION INTRAVENOUS at 12:05

## 2021-12-30 RX ADMIN — SODIUM CHLORIDE, SODIUM LACTATE, POTASSIUM CHLORIDE, AND CALCIUM CHLORIDE 50 ML/HR: .6; .31; .03; .02 INJECTION, SOLUTION INTRAVENOUS at 12:15

## 2021-12-30 RX ADMIN — PROPOFOL 500 MG: 10 INJECTION, EMULSION INTRAVENOUS at 11:53

## 2021-12-30 RX ADMIN — LIDOCAINE HYDROCHLORIDE 100 MG: 20 INJECTION, SOLUTION EPIDURAL; INFILTRATION; INTRACAUDAL; PERINEURAL at 11:47

## 2021-12-30 RX ADMIN — SODIUM CHLORIDE 9 ML/HR: 9 INJECTION, SOLUTION INTRAVENOUS at 10:31

## 2021-12-30 RX ADMIN — METRONIDAZOLE 500 MG: 500 TABLET ORAL at 12:45

## 2021-12-30 NOTE — ANESTHESIA PREPROCEDURE EVALUATION
Anesthesia Evaluation     Patient summary reviewed and Nursing notes reviewed   NPO Solid Status: > 8 hours  NPO Liquid Status: > 8 hours           Airway   Mallampati: II  TM distance: >3 FB  Neck ROM: full  No difficulty expected  Dental - normal exam     Pulmonary    (+) asthma,  Cardiovascular     (+) hyperlipidemia,       Neuro/Psych  GI/Hepatic/Renal/Endo    (+) obesity,       ROS Comment: Pancreatic cyst    Musculoskeletal     Abdominal    Substance History      OB/GYN          Other   arthritis,    history of cancer                    Anesthesia Plan    ASA 2     MAC   total IV anesthesia  intravenous induction     Anesthetic plan, all risks, benefits, and alternatives have been provided, discussed and informed consent has been obtained with: patient.    Plan discussed with CAA.

## 2021-12-30 NOTE — ANESTHESIA POSTPROCEDURE EVALUATION
Patient: Jim Malik    Procedure Summary     Date: 12/30/21 Room / Location: [facility] ENDOSCOPY 2 / [facility] ENDOSCOPY    Anesthesia Start: 1145 Anesthesia Stop: 1220    Procedure: ENDOSCOPIC ULTRASOUND WITH FINE NEEDLE ASPIRATION OF PANCREATIC CYST (N/A ) Diagnosis:       Pancreatic cyst      (Pancreatic cyst [K86.2])    Surgeons: Jennifer Balderas MD Provider: Juma Britton MD    Anesthesia Type: MAC ASA Status: 2          Anesthesia Type: MAC    Vitals  Vitals Value Taken Time   /97 12/30/21 1259   Temp     Pulse 66 12/30/21 1259   Resp 13 12/30/21 1259   SpO2 98 % 12/30/21 1259           Post Anesthesia Care and Evaluation    Patient location during evaluation: PACU  Patient participation: complete - patient participated  Level of consciousness: awake  Pain scale: See nurse's notes for pain score.  Pain management: adequate  Airway patency: patent  Anesthetic complications: No anesthetic complications  PONV Status: none  Cardiovascular status: acceptable  Respiratory status: acceptable  Hydration status: acceptable    Comments: Patient seen and examined postoperatively; vital signs stable; SpO2 greater than or equal to 90%; cardiopulmonary status stable; nausea/vomiting adequately controlled; pain adequately controlled; no apparent anesthesia complications; patient discharged from anesthesia care when discharge criteria were met

## 2022-01-18 ENCOUNTER — TELEPHONE (OUTPATIENT)
Dept: FAMILY MEDICINE CLINIC | Facility: CLINIC | Age: 72
End: 2022-01-18

## 2022-01-18 RX ORDER — BUDESONIDE AND FORMOTEROL FUMARATE DIHYDRATE 160; 4.5 UG/1; UG/1
2 AEROSOL RESPIRATORY (INHALATION) 2 TIMES DAILY
Qty: 90 EACH | Refills: 3 | Status: SHIPPED | OUTPATIENT
Start: 2022-01-18

## 2022-01-20 ENCOUNTER — TRANSCRIBE ORDERS (OUTPATIENT)
Dept: ADMINISTRATIVE | Facility: HOSPITAL | Age: 72
End: 2022-01-20

## 2022-01-20 DIAGNOSIS — K86.2 PANCREATIC CYST: Primary | ICD-10-CM

## 2022-03-01 ENCOUNTER — HOSPITAL ENCOUNTER (OUTPATIENT)
Dept: CT IMAGING | Facility: HOSPITAL | Age: 72
Discharge: HOME | End: 2022-03-01
Admitting: INTERNAL MEDICINE

## 2022-03-01 DIAGNOSIS — K86.2 PANCREATIC CYST: ICD-10-CM

## 2022-03-01 LAB
CREAT BLDA-MCNC: 1 MG/DL (ref 0.6–1.3)
EGFRCR SERPLBLD CKD-EPI 2021: 80.5 ML/MIN/1.73

## 2022-03-01 PROCEDURE — 0 IOPAMIDOL PER 1 ML: Performed by: INTERNAL MEDICINE

## 2022-03-01 PROCEDURE — 82565 ASSAY OF CREATININE: CPT

## 2022-03-01 PROCEDURE — 74178 CT ABD&PLV WO CNTR FLWD CNTR: CPT

## 2022-03-01 RX ADMIN — IOPAMIDOL 100 ML: 755 INJECTION, SOLUTION INTRAVENOUS at 10:41

## 2022-03-02 DIAGNOSIS — J45.20 MILD INTERMITTENT ASTHMA WITHOUT COMPLICATION: Primary | ICD-10-CM

## 2022-03-07 ENCOUNTER — HOSPITAL ENCOUNTER (OUTPATIENT)
Dept: GENERAL RADIOLOGY | Facility: HOSPITAL | Age: 72
Discharge: HOME | End: 2022-03-07
Admitting: FAMILY MEDICINE

## 2022-03-07 DIAGNOSIS — J45.20 MILD INTERMITTENT ASTHMA WITHOUT COMPLICATION: ICD-10-CM

## 2022-03-07 PROCEDURE — 71046 X-RAY EXAM CHEST 2 VIEWS: CPT

## 2022-03-11 ENCOUNTER — OFFICE (OUTPATIENT)
Dept: URBAN - METROPOLITAN AREA CLINIC 64 | Facility: CLINIC | Age: 72
End: 2022-03-11

## 2022-03-11 VITALS
WEIGHT: 247 LBS | DIASTOLIC BLOOD PRESSURE: 98 MMHG | SYSTOLIC BLOOD PRESSURE: 157 MMHG | HEIGHT: 72 IN | HEART RATE: 80 BPM

## 2022-03-11 DIAGNOSIS — K86.2 CYST OF PANCREAS: ICD-10-CM

## 2022-03-11 DIAGNOSIS — R06.09 DYSPNEA ON EXERTION: Primary | ICD-10-CM

## 2022-03-11 PROCEDURE — 99213 OFFICE O/P EST LOW 20 MIN: CPT | Performed by: INTERNAL MEDICINE

## 2022-03-11 RX ORDER — ALBUTEROL SULFATE 90 UG/1
2 AEROSOL, METERED RESPIRATORY (INHALATION) EVERY 4 HOURS PRN
Qty: 8 G | Refills: 5 | Status: SHIPPED | OUTPATIENT
Start: 2022-03-11 | End: 2022-03-11

## 2022-03-11 RX ORDER — ALBUTEROL SULFATE 90 UG/1
2 AEROSOL, METERED RESPIRATORY (INHALATION) EVERY 4 HOURS PRN
Qty: 8 G | Refills: 5 | Status: SHIPPED | OUTPATIENT
Start: 2022-03-11 | End: 2023-04-05 | Stop reason: SDUPTHER

## 2022-03-16 DIAGNOSIS — J45.20 MILD INTERMITTENT ASTHMA WITHOUT COMPLICATION: Primary | ICD-10-CM

## 2022-03-16 RX ORDER — PREDNISONE 10 MG/1
TABLET ORAL
Qty: 20 TABLET | Refills: 0 | Status: SHIPPED | OUTPATIENT
Start: 2022-03-16 | End: 2022-04-04

## 2022-04-04 ENCOUNTER — OFFICE VISIT (OUTPATIENT)
Dept: FAMILY MEDICINE CLINIC | Facility: CLINIC | Age: 72
End: 2022-04-04

## 2022-04-04 VITALS
DIASTOLIC BLOOD PRESSURE: 89 MMHG | HEIGHT: 72 IN | RESPIRATION RATE: 18 BRPM | TEMPERATURE: 96.8 F | HEART RATE: 75 BPM | WEIGHT: 240 LBS | SYSTOLIC BLOOD PRESSURE: 145 MMHG | OXYGEN SATURATION: 96 % | BODY MASS INDEX: 32.51 KG/M2

## 2022-04-04 DIAGNOSIS — J30.89 NON-SEASONAL ALLERGIC RHINITIS DUE TO FUNGAL SPORES: Chronic | ICD-10-CM

## 2022-04-04 DIAGNOSIS — D49.0 IPMN (INTRADUCTAL PAPILLARY MUCINOUS NEOPLASM): ICD-10-CM

## 2022-04-04 DIAGNOSIS — E78.5 HYPERLIPIDEMIA, UNSPECIFIED HYPERLIPIDEMIA TYPE: Chronic | ICD-10-CM

## 2022-04-04 DIAGNOSIS — Z00.00 ENCOUNTER FOR ANNUAL WELLNESS EXAM IN MEDICARE PATIENT: Primary | ICD-10-CM

## 2022-04-04 DIAGNOSIS — J45.20 MILD INTERMITTENT ASTHMA WITHOUT COMPLICATION: Chronic | ICD-10-CM

## 2022-04-04 PROCEDURE — 1126F AMNT PAIN NOTED NONE PRSNT: CPT | Performed by: FAMILY MEDICINE

## 2022-04-04 PROCEDURE — 99213 OFFICE O/P EST LOW 20 MIN: CPT | Performed by: FAMILY MEDICINE

## 2022-04-04 PROCEDURE — G0439 PPPS, SUBSEQ VISIT: HCPCS | Performed by: FAMILY MEDICINE

## 2022-04-04 PROCEDURE — 1170F FXNL STATUS ASSESSED: CPT | Performed by: FAMILY MEDICINE

## 2022-04-04 PROCEDURE — 1160F RVW MEDS BY RX/DR IN RCRD: CPT | Performed by: FAMILY MEDICINE

## 2022-04-04 NOTE — PATIENT INSTRUCTIONS
Have the labs done and call for results.    Create a living will.    Have the follow up labs done and call for results.

## 2022-04-04 NOTE — PROGRESS NOTES
The ABCs of the Annual Wellness Visit  Subsequent Medicare Wellness Visit    Chief Complaint   Patient presents with   • Medicare Wellness-subsequent   • Hyperlipidemia     6 month follow up      Subjective    History of Present Illness:  Jim Malik is a 72 y.o. male who presents for a Subsequent Medicare Wellness Visit.    The following portions of the patient's history were reviewed and   updated as appropriate: allergies, current medications, past family history, past medical history, past social history, past surgical history and problem list.    Compared to one year ago, the patient feels his physical   health is the same.    Compared to one year ago, the patient feels his mental   health is the same.    Recent Hospitalizations:  He was not admitted to the hospital during the last year.       Current Medical Providers:  Patient Care Team:  Naeem Mehta Jr., MD as PCP - General (Family Medicine)    Outpatient Medications Prior to Visit   Medication Sig Dispense Refill   • albuterol sulfate HFA (Proventil HFA) 108 (90 Base) MCG/ACT inhaler Inhale 2 puffs Every 4 (Four) Hours As Needed for Wheezing. 8 g 5   • budesonide-formoterol (Symbicort) 160-4.5 MCG/ACT inhaler Inhale 2 puffs 2 (Two) Times a Day. 90 each 3   • montelukast (SINGULAIR) 10 MG tablet Take 1 tablet by mouth Daily. 90 tablet 8   • naproxen (Naprosyn) 500 MG tablet Take 1 tablet by mouth 2 (Two) Times a Day With Meals. 180 tablet 1   • sildenafil (VIAGRA) 100 MG tablet TAKE 1/2 - 1 TABLET BY MOUTH 1 HOUR PRIOR TO INTERCOURSE 12 tablet 7   • predniSONE (DELTASONE) 10 MG tablet Take 4 by mouth a day for 2 days, then 3 a day for 2 days, then 2 a day for 2 days, then 1 a day 20 tablet 0     No facility-administered medications prior to visit.       No opioid medication identified on active medication list. I have reviewed chart for other potential  high risk medication/s and harmful drug interactions in the elderly.          Aspirin is not on  "active medication list.  Aspirin use is not indicated based on review of current medical condition/s. Risk of harm outweighs potential benefits.  .    Patient Active Problem List   Diagnosis   • Multinodular goiter   • Hyperlipidemia   • Asthma   • Allergic rhinitis   • Basal cell carcinoma, scalp/neck   • Primary osteoarthritis involving multiple joints   • IPMN (intraductal papillary mucinous neoplasm)     Advance Care Planning  Advance Directive is not on file.  ACP discussion was held with the patient during this visit. Patient does not have an advance directive, information provided.          Objective    Vitals:    04/04/22 0843   BP: 145/89   BP Location: Left arm   Patient Position: Sitting   Cuff Size: Adult   Pulse: 75   Resp: 18   Temp: 96.8 °F (36 °C)   TempSrc: Infrared   SpO2: 96%   Weight: 109 kg (240 lb)   Height: 182.9 cm (72\")   PainSc: 0-No pain     BMI Readings from Last 1 Encounters:   04/04/22 32.55 kg/m²   BMI is above normal parameters. Recommendations include: exercise counseling    Does the patient have evidence of cognitive impairment? No    Physical Exam  Vitals and nursing note reviewed.   Constitutional:       Appearance: He is well-developed and normal weight.   HENT:      Head: Normocephalic and atraumatic.   Eyes:      Pupils: Pupils are equal, round, and reactive to light.   Cardiovascular:      Rate and Rhythm: Normal rate and regular rhythm.      Pulses: Normal pulses.      Heart sounds: Normal heart sounds.     No gallop.   Pulmonary:      Effort: Pulmonary effort is normal.      Breath sounds: Normal breath sounds.   Abdominal:      General: Bowel sounds are normal.      Palpations: Abdomen is soft.   Musculoskeletal:         General: Normal range of motion.      Cervical back: Neck supple.   Skin:     General: Skin is warm and dry.   Neurological:      Mental Status: He is alert and oriented to person, place, and time.   Psychiatric:         Behavior: Behavior normal.         " Thought Content: Thought content normal.         Judgment: Judgment normal.                 HEALTH RISK ASSESSMENT    Smoking Status:  Social History     Tobacco Use   Smoking Status Never Smoker   Smokeless Tobacco Never Used     Alcohol Consumption:  Social History     Substance and Sexual Activity   Alcohol Use Yes   • Alcohol/week: 1.0 standard drink   • Types: 1 Cans of beer per week     Fall Risk Screen:    KWAME Fall Risk Assessment was completed, and patient is at LOW risk for falls.Assessment completed on:4/4/2022    Depression Screening:  PHQ-2/PHQ-9 Depression Screening 4/4/2022   Retired Total Score -   Little Interest or Pleasure in Doing Things 0-->not at all   Feeling Down, Depressed or Hopeless 0-->not at all   PHQ-9: Brief Depression Severity Measure Score 0       Health Habits and Functional and Cognitive Screening:  Functional & Cognitive Status 4/4/2022   Do you have difficulty preparing food and eating? No   Do you have difficulty bathing yourself, getting dressed or grooming yourself? No   Do you have difficulty using the toilet? No   Do you have difficulty moving around from place to place? No   Do you have trouble with steps or getting out of a bed or a chair? No   Current Diet Well Balanced Diet   Dental Exam Not up to date   Eye Exam Up to date   Exercise (times per week) 1 times per week   Current Exercises Include Aerobics   Current Exercise Activities Include -   Do you need help using the phone?  No   Are you deaf or do you have serious difficulty hearing?  No   Do you need help with transportation? No   Do you need help shopping? No   Do you need help preparing meals?  No   Do you need help with housework?  No   Do you need help with laundry? No   Do you need help taking your medications? No   Do you need help managing money? No   Do you ever drive or ride in a car without wearing a seat belt? No   Have you felt unusual stress, anger or loneliness in the last month? No   Who do you  live with? Spouse   If you need help, do you have trouble finding someone available to you? No   Have you been bothered in the last four weeks by sexual problems? No   Do you have difficulty concentrating, remembering or making decisions? No       Age-appropriate Screening Schedule:  Refer to the list below for future screening recommendations based on patient's age, sex and/or medical conditions. Orders for these recommended tests are listed in the plan section. The patient has been provided with a written plan.    Health Maintenance   Topic Date Due   • LIPID PANEL  04/04/2022 (Originally 6/15/2021)   • TDAP/TD VACCINES (1 - Tdap) 09/15/2022 (Originally 2/9/1969)   • ZOSTER VACCINE (1 of 2) 04/04/2023 (Originally 2/9/2001)   • INFLUENZA VACCINE  08/01/2022              Assessment/Plan   CMS Preventative Services Quick Reference  Risk Factors Identified During Encounter  Immunizations Discussed/Encouraged (specific Immunizations; Td, Influenza, Pneumococcal 23, Shingrix and COVID19  The above risks/problems have been discussed with the patient.  Follow up actions/plans if indicated are seen below in the Assessment/Plan Section.  Pertinent information has been shared with the patient in the After Visit Summary.    Diagnoses and all orders for this visit:    1. Encounter for annual wellness exam in Medicare patient (Primary)    2. Mild intermittent asthma without complication    3. Non-seasonal allergic rhinitis due to fungal spores    4. Hyperlipidemia, unspecified hyperlipidemia type  -     Lipid Panel; Future    5. IPMN (intraductal papillary mucinous neoplasm)  -     CBC & Differential; Future  -     Comprehensive Metabolic Panel; Future        Follow Up:   No follow-ups on file.     An After Visit Summary and PPPS were made available to the patient.

## 2022-04-04 NOTE — PROGRESS NOTES
"Subjective   Jim Malik is a 72 y.o. male.     Chief Complaint   Patient presents with   • Medicare Wellness-subsequent   • Hyperlipidemia     6 month follow up       HPI  ***        The following portions of the patient's history were reviewed and updated as appropriate: allergies, current medications, past family history, past medical history, past social history, past surgical history and problem list.    Review of Systems    Objective     /89 (BP Location: Left arm, Patient Position: Sitting, Cuff Size: Adult)   Pulse 75   Temp 96.8 °F (36 °C) (Infrared)   Resp 18   Ht 182.9 cm (72\")   Wt 109 kg (240 lb)   SpO2 96%   BMI 32.55 kg/m²     Physical Exam  Vitals and nursing note reviewed.   Constitutional:       Appearance: He is well-developed.   HENT:      Head: Normocephalic and atraumatic.      Left Ear: Tympanic membrane normal.      Nose: Nose normal.      Mouth/Throat:      Mouth: Mucous membranes are dry.      Pharynx: Oropharynx is clear.   Eyes:      Pupils: Pupils are equal, round, and reactive to light.   Cardiovascular:      Rate and Rhythm: Normal rate and regular rhythm.      Pulses: Normal pulses.      Heart sounds: Normal heart sounds.   Pulmonary:      Effort: Pulmonary effort is normal.      Breath sounds: Normal breath sounds.   Abdominal:      General: Abdomen is flat. Bowel sounds are normal.      Palpations: Abdomen is soft.   Musculoskeletal:         General: Normal range of motion.      Cervical back: Neck supple.   Skin:     General: Skin is warm and dry.   Neurological:      Mental Status: He is alert and oriented to person, place, and time.   Psychiatric:         Behavior: Behavior normal.         Thought Content: Thought content normal.         Judgment: Judgment normal.           Assessment/Plan   Diagnoses and all orders for this visit:    1. Mild intermittent asthma without complication (Primary)    2. Non-seasonal allergic rhinitis due to fungal spores    3. " Hyperlipidemia, unspecified hyperlipidemia type  -     Lipid Panel; Future    4. Encounter for annual wellness exam in Medicare patient    5. IPMN (intraductal papillary mucinous neoplasm)  -     CBC & Differential; Future  -     Comprehensive Metabolic Panel; Future      Patient Instructions   Have the labs done and call for results.    Create a living will.    Have the follow up labs done and call for results.          Naeem Mehta Jr., MD    04/04/22

## 2022-04-04 NOTE — PROGRESS NOTES
"Subjective   Jim Malik is a 72 y.o. male.     Chief Complaint   Patient presents with   • Medicare Wellness-subsequent   • Hyperlipidemia     6 month follow up       HPI  Chief complaint: Asthma hyperlipidemia papillary neoplasm of the pancreas    Patient is a 72-year-old white male comes in for follow-up and maintenance of his current problems which include    1.  Asthma/allergic rhinitis-deteriorated-patient on Symbicort 160/4.5 2 puffs twice a day Singulair 10 daily a rescue inhaler.  Patient has ongoing cough and wheezing.  Patient states that he has not really responded much to the inhalers.  He is scheduled to see pulmonary medicine.  He states that the rescue inhaler will give him relief for a short period of time.    2.  Hyperlipidemia-stable-patient is current on a diet.    3.  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  Patient had a recent ultrasound which revealed the cyst is not gotten larger.  He is asymptomatic.  He sees gastroenterology on a regular basis.        The following portions of the patient's history were reviewed and updated as appropriate: allergies, current medications, past family history, past medical history, past social history, past surgical history and problem list.    Review of Systems    Objective     /89 (BP Location: Left arm, Patient Position: Sitting, Cuff Size: Adult)   Pulse 75   Temp 96.8 °F (36 °C) (Infrared)   Resp 18   Ht 182.9 cm (72\")   Wt 109 kg (240 lb)   SpO2 96%   BMI 32.55 kg/m²     Physical Exam  Vitals and nursing note reviewed.   Constitutional:       Appearance: He is well-developed and normal weight.   HENT:      Head: Normocephalic and atraumatic.      Right Ear: Tympanic membrane normal.      Left Ear: Tympanic membrane normal.      Nose: Nose normal.      Mouth/Throat:      Mouth: Mucous membranes are moist.      Pharynx: Oropharynx is clear.   Eyes:      Extraocular Movements: " Extraocular movements intact.      Conjunctiva/sclera: Conjunctivae normal.      Pupils: Pupils are equal, round, and reactive to light.   Cardiovascular:      Rate and Rhythm: Normal rate and regular rhythm.      Pulses: Normal pulses.      Heart sounds: Normal heart sounds. No murmur heard.    No friction rub. No gallop.   Pulmonary:      Effort: Pulmonary effort is normal.      Breath sounds: Normal breath sounds.   Abdominal:      General: Abdomen is flat. Bowel sounds are normal.      Palpations: Abdomen is soft.   Musculoskeletal:         General: Normal range of motion.      Cervical back: Neck supple.   Skin:     General: Skin is warm and dry.   Neurological:      Mental Status: He is alert and oriented to person, place, and time.   Psychiatric:         Behavior: Behavior normal.         Thought Content: Thought content normal.         Judgment: Judgment normal.           Assessment/Plan   Diagnoses and all orders for this visit:    1. Mild intermittent asthma without complication (Primary)    2. Non-seasonal allergic rhinitis due to fungal spores    3. Hyperlipidemia, unspecified hyperlipidemia type    4. Encounter for annual wellness exam in Medicare patient    5. IPMN (intraductal papillary mucinous neoplasm)      Patient Instructions   Have the labs done and call for results.    Create a living will.    Have the follow up labs done and call for results.          Naeem Mehta Jr., MD    04/04/22

## 2022-04-19 ENCOUNTER — TRANSCRIBE ORDERS (OUTPATIENT)
Dept: ADMINISTRATIVE | Facility: HOSPITAL | Age: 72
End: 2022-04-19

## 2022-04-19 ENCOUNTER — LAB (OUTPATIENT)
Dept: LAB | Facility: HOSPITAL | Age: 72
End: 2022-04-19

## 2022-04-19 DIAGNOSIS — K86.2 PANCREAS CYST: ICD-10-CM

## 2022-04-19 DIAGNOSIS — D49.0 IPMN (INTRADUCTAL PAPILLARY MUCINOUS NEOPLASM): ICD-10-CM

## 2022-04-19 DIAGNOSIS — E78.5 HYPERLIPIDEMIA, UNSPECIFIED HYPERLIPIDEMIA TYPE: Chronic | ICD-10-CM

## 2022-04-19 DIAGNOSIS — K86.2 PANCREAS CYST: Primary | ICD-10-CM

## 2022-04-19 DIAGNOSIS — R06.09 DYSPNEA ON EXERTION: ICD-10-CM

## 2022-04-19 LAB
ALBUMIN SERPL-MCNC: 4.6 G/DL (ref 3.5–5.2)
ALBUMIN/GLOB SERPL: 1.8 G/DL
ALP SERPL-CCNC: 73 U/L (ref 39–117)
ALT SERPL W P-5'-P-CCNC: 25 U/L (ref 1–41)
AMYLASE SERPL-CCNC: 37 U/L (ref 28–100)
ANION GAP SERPL CALCULATED.3IONS-SCNC: 10 MMOL/L (ref 5–15)
AST SERPL-CCNC: 27 U/L (ref 1–40)
BASOPHILS # BLD AUTO: 0.05 10*3/MM3 (ref 0–0.2)
BASOPHILS NFR BLD AUTO: 1.1 % (ref 0–1.5)
BILIRUB SERPL-MCNC: 0.5 MG/DL (ref 0–1.2)
BUN SERPL-MCNC: 18 MG/DL (ref 8–23)
BUN/CREAT SERPL: 18.6 (ref 7–25)
CALCIUM SPEC-SCNC: 9.4 MG/DL (ref 8.6–10.5)
CANCER AG19-9 SERPL-ACNC: 16.5 U/ML
CHLORIDE SERPL-SCNC: 107 MMOL/L (ref 98–107)
CHOLEST SERPL-MCNC: 191 MG/DL (ref 0–200)
CO2 SERPL-SCNC: 25 MMOL/L (ref 22–29)
CREAT SERPL-MCNC: 0.97 MG/DL (ref 0.76–1.27)
D DIMER PPP FEU-MCNC: 0.71 MG/L (FEU) (ref 0–0.59)
DEPRECATED RDW RBC AUTO: 42.9 FL (ref 37–54)
EGFRCR SERPLBLD CKD-EPI 2021: 83.5 ML/MIN/1.73
EOSINOPHIL # BLD AUTO: 0.31 10*3/MM3 (ref 0–0.4)
EOSINOPHIL NFR BLD AUTO: 6.6 % (ref 0.3–6.2)
ERYTHROCYTE [DISTWIDTH] IN BLOOD BY AUTOMATED COUNT: 12.5 % (ref 12.3–15.4)
GLOBULIN UR ELPH-MCNC: 2.6 GM/DL
GLUCOSE SERPL-MCNC: 89 MG/DL (ref 65–99)
HCT VFR BLD AUTO: 39.2 % (ref 37.5–51)
HDLC SERPL-MCNC: 50 MG/DL (ref 40–60)
HGB BLD-MCNC: 13.2 G/DL (ref 13–17.7)
IMM GRANULOCYTES # BLD AUTO: 0.02 10*3/MM3 (ref 0–0.05)
IMM GRANULOCYTES NFR BLD AUTO: 0.4 % (ref 0–0.5)
LDLC SERPL CALC-MCNC: 129 MG/DL (ref 0–100)
LDLC/HDLC SERPL: 2.56 {RATIO}
LIPASE SERPL-CCNC: 19 U/L (ref 13–60)
LYMPHOCYTES # BLD AUTO: 1.27 10*3/MM3 (ref 0.7–3.1)
LYMPHOCYTES NFR BLD AUTO: 27 % (ref 19.6–45.3)
MCH RBC QN AUTO: 32 PG (ref 26.6–33)
MCHC RBC AUTO-ENTMCNC: 33.7 G/DL (ref 31.5–35.7)
MCV RBC AUTO: 95.1 FL (ref 79–97)
MONOCYTES # BLD AUTO: 0.45 10*3/MM3 (ref 0.1–0.9)
MONOCYTES NFR BLD AUTO: 9.6 % (ref 5–12)
NEUTROPHILS NFR BLD AUTO: 2.6 10*3/MM3 (ref 1.7–7)
NEUTROPHILS NFR BLD AUTO: 55.3 % (ref 42.7–76)
NRBC BLD AUTO-RTO: 0 /100 WBC (ref 0–0.2)
NT-PROBNP SERPL-MCNC: 48.9 PG/ML (ref 0–900)
PLATELET # BLD AUTO: 165 10*3/MM3 (ref 140–450)
PMV BLD AUTO: 9.5 FL (ref 6–12)
POTASSIUM SERPL-SCNC: 4.4 MMOL/L (ref 3.5–5.2)
PROT SERPL-MCNC: 7.2 G/DL (ref 6–8.5)
RBC # BLD AUTO: 4.12 10*6/MM3 (ref 4.14–5.8)
SODIUM SERPL-SCNC: 142 MMOL/L (ref 136–145)
TRIGL SERPL-MCNC: 66 MG/DL (ref 0–150)
VLDLC SERPL-MCNC: 12 MG/DL (ref 5–40)
WBC NRBC COR # BLD: 4.7 10*3/MM3 (ref 3.4–10.8)

## 2022-04-19 PROCEDURE — 86301 IMMUNOASSAY TUMOR CA 19-9: CPT

## 2022-04-19 PROCEDURE — 80061 LIPID PANEL: CPT

## 2022-04-19 PROCEDURE — 80053 COMPREHEN METABOLIC PANEL: CPT

## 2022-04-19 PROCEDURE — 82150 ASSAY OF AMYLASE: CPT

## 2022-04-19 PROCEDURE — 85379 FIBRIN DEGRADATION QUANT: CPT

## 2022-04-19 PROCEDURE — 83880 ASSAY OF NATRIURETIC PEPTIDE: CPT

## 2022-04-19 PROCEDURE — 83690 ASSAY OF LIPASE: CPT

## 2022-04-19 PROCEDURE — 36415 COLL VENOUS BLD VENIPUNCTURE: CPT

## 2022-04-19 PROCEDURE — 85025 COMPLETE CBC W/AUTO DIFF WBC: CPT

## 2022-04-20 DIAGNOSIS — R06.00 DYSPNEA, UNSPECIFIED TYPE: Primary | ICD-10-CM

## 2022-04-20 DIAGNOSIS — R79.89 D-DIMER, ELEVATED: ICD-10-CM

## 2022-04-21 ENCOUNTER — HOSPITAL ENCOUNTER (OUTPATIENT)
Dept: CT IMAGING | Facility: HOSPITAL | Age: 72
Discharge: HOME | End: 2022-04-21
Admitting: FAMILY MEDICINE

## 2022-04-21 DIAGNOSIS — R06.00 DYSPNEA, UNSPECIFIED TYPE: ICD-10-CM

## 2022-04-21 DIAGNOSIS — R79.89 D-DIMER, ELEVATED: ICD-10-CM

## 2022-04-21 PROCEDURE — 71275 CT ANGIOGRAPHY CHEST: CPT

## 2022-04-21 PROCEDURE — 0 IOPAMIDOL PER 1 ML: Performed by: FAMILY MEDICINE

## 2022-04-21 RX ADMIN — IOPAMIDOL 100 ML: 755 INJECTION, SOLUTION INTRAVENOUS at 13:13

## 2022-04-22 ENCOUNTER — TRANSCRIBE ORDERS (OUTPATIENT)
Dept: ADMINISTRATIVE | Facility: HOSPITAL | Age: 72
End: 2022-04-22

## 2022-04-22 DIAGNOSIS — J45.20 ASTHMA, MILD INTERMITTENT, POORLY CONTROLLED: Primary | ICD-10-CM

## 2022-04-22 DIAGNOSIS — Z01.818 OTHER SPECIFIED PRE-OPERATIVE EXAMINATION: ICD-10-CM

## 2022-04-27 ENCOUNTER — DOCUMENTATION (OUTPATIENT)
Dept: FAMILY MEDICINE CLINIC | Facility: CLINIC | Age: 72
End: 2022-04-27

## 2022-04-27 DIAGNOSIS — I25.10 CORONARY ARTERY DISEASE INVOLVING NATIVE CORONARY ARTERY OF NATIVE HEART WITHOUT ANGINA PECTORIS: Primary | ICD-10-CM

## 2022-04-27 RX ORDER — ASPIRIN 81 MG/1
81 TABLET ORAL DAILY
Qty: 90 TABLET | Refills: 1 | Status: SHIPPED | OUTPATIENT
Start: 2022-04-27 | End: 2022-09-30

## 2022-04-27 RX ORDER — ROSUVASTATIN CALCIUM 5 MG/1
5 TABLET, COATED ORAL DAILY
Qty: 90 TABLET | Refills: 1 | Status: SHIPPED | OUTPATIENT
Start: 2022-04-27 | End: 2022-09-30

## 2022-04-27 NOTE — PROGRESS NOTES
I spoke with the patient regarding his CAT scan which revealed dense coronary artery calcifications.  I recommended the patient see a cardiologist for further evaluation.  I recommended that he start aspirin 81 mg daily and Crestor 5 mg daily.  I wrote the referral to cardiology.

## 2022-05-04 ENCOUNTER — OFFICE VISIT (OUTPATIENT)
Dept: CARDIOLOGY | Facility: CLINIC | Age: 72
End: 2022-05-04

## 2022-05-04 VITALS
RESPIRATION RATE: 18 BRPM | WEIGHT: 234.6 LBS | SYSTOLIC BLOOD PRESSURE: 122 MMHG | HEIGHT: 72 IN | DIASTOLIC BLOOD PRESSURE: 84 MMHG | HEART RATE: 76 BPM | BODY MASS INDEX: 31.77 KG/M2

## 2022-05-04 DIAGNOSIS — R93.1 HIGH CORONARY ARTERY CALCIUM SCORE: Primary | ICD-10-CM

## 2022-05-04 PROCEDURE — 99204 OFFICE O/P NEW MOD 45 MIN: CPT | Performed by: INTERNAL MEDICINE

## 2022-05-04 PROCEDURE — 93000 ELECTROCARDIOGRAM COMPLETE: CPT | Performed by: INTERNAL MEDICINE

## 2022-05-08 RX ORDER — MONTELUKAST SODIUM 10 MG/1
TABLET ORAL
Qty: 30 TABLET | Refills: 26 | Status: SHIPPED | OUTPATIENT
Start: 2022-05-08

## 2022-06-06 NOTE — PROGRESS NOTES
Cardiology Clinic Note  Marcelo Cody MD, PhD    Subjective:     Encounter Date:05/04/2022      Patient ID: Jim Malik is a 72 y.o. male.    Chief Complaint:  Chief Complaint   Patient presents with   • Coronary Artery Disease       HPI:    I the pleasure to see this very pleasant 72-year-old gentleman as a new patient today with history of CAD to establish care.  Last stress test was in October of last year along with his last 2D echo.  October 2021, he has a history of hyperlipidemia and CAD.  Medicines consist of aspirin, rosuvastatin, sildenafil intermittently for rectal dysfunction, echo demonstrated as reviewed and interpreted by me demonstrates EF of 60 to 65% with normal RV, normal atria, trace to mild valvular vegetation as documented but nothing significant, no valvular stenoses were seen, stress test revealed normal stress ECG with patient walked for 7 to 8 minutes with no ischemia demonstrated on the study, reasonable exertional capacity.  He had a CT in April of this year with no findings of PE, there was dense coronary calcifications demonstrated on the CT scan which is why he was sent here.  His stress test is low risk as a functional study, he is a preserved EF, we discussed primary prevention goals which include high intensity statin, exercise, blood pressure control, heart healthy diet and low-cholesterol diet which she is amenable for.  Outside of active functional symptoms with a reassuring functional study at this time medications are warranted rather than invasive ischemic evaluation    Review of systems otherwise negative x14 point review of systems except was mentioned above    Historical data copied forward from previous encounters in EMR including the history, exam, and assessment/plan has been reviewed and is unchanged unless noted otherwise.    Cardiac medicines reviewed with risk, benefits, and necessity of each discussed.    Risk and benefit of cardiac testing reviewed including  "death heart attack stroke pain bleeding infection need for vascular /cardiovascular surgery were discussed and the patient     Objective:         /84 (BP Location: Left arm, Patient Position: Sitting)   Pulse 76   Resp 18   Ht 182.9 cm (72\")   Wt 106 kg (234 lb 9.6 oz)   BMI 31.82 kg/m²     Physical Exam  Normal exam today  Assessment:         There are no diagnoses linked to this encounter.       Plan:        Coronary artery disease  Asymptomatic  Low risk stress test  Normal EF  No invasive ischemic evaluation warranted at this time  Standard primary prevention goals, aspirin statin on board  Heart healthy diet, low-cholesterol diet, encourage activity  Could warrant overt calcium scoring for grading risk  Will defer to primary care on this in the future  Blood pressures well controlled  Heart rate well controlled    Marcelo Cody MD, PhD    Will order coronary calcium scoring    The pleasure to be involved in this patient's cardiovascular care.  Please call with any questions or concerns  Marcelo Cody MD, PhD    Most recent EKG as reviewed and interpreted by me:    ECG 12 Lead    Date/Time: 5/4/2022 11:50 AM  Performed by: Marcelo Cody MD  Authorized by: Marcelo Cody MD   Comparison: not compared with previous ECG   Rhythm: sinus rhythm  Rate: normal  Conduction: conduction normal  ST Segments: ST segments normal  T Waves: T waves normal  QRS axis: normal    Clinical impression: normal ECG             Most recent echo as reviewed and interpreted by me:  Results for orders placed during the hospital encounter of 09/29/21    Adult Transthoracic Echo Complete W/ Cont if Necessary Per Protocol    Interpretation Summary  Normal LV size and contractility EF of 60 to 65%  Normal RV size  Normal atrial size  Aortic valve, mitral valve, tricuspid valve appears structurally normal, no significant regurgitation seen.  No pericardial effusion seen.  Proximal aorta appears normal in " size.      Most recent stress test as reviewed and interpreted by me:  Results for orders placed during the hospital encounter of 10/28/21    Treadmill stress test    Interpretation Summary  Treadmill testing reveals normal EKG response without any angina and patient walked for 7 minutes on [place] protocol without ST-T wave changes and achieved adequate heart rate and blood pressure response was normal.  No arrhythmias noted.  Normal treadmill testing in place and this places the patient in the low risk category  No evidence of ischemia on the study    Electronically signed by Eliel Merchant MD, 10/28/21, 9:41 AM EDT.      Most recent cardiac catheterization as reviewed interpreted by me:  No results found for this or any previous visit.    The following portions of the patient's history were reviewed and updated as appropriate: allergies, current medications, past family history, past medical history, past social history, past surgical history and problem list.      ROS:  14 point review of systems negative except as mentioned above    Current Outpatient Medications:   •  albuterol sulfate HFA (Proventil HFA) 108 (90 Base) MCG/ACT inhaler, Inhale 2 puffs Every 4 (Four) Hours As Needed for Wheezing., Disp: 8 g, Rfl: 5  •  aspirin (aspirin) 81 MG EC tablet, Take 1 tablet by mouth Daily., Disp: 90 tablet, Rfl: 1  •  budesonide-formoterol (Symbicort) 160-4.5 MCG/ACT inhaler, Inhale 2 puffs 2 (Two) Times a Day., Disp: 90 each, Rfl: 3  •  naproxen (Naprosyn) 500 MG tablet, Take 1 tablet by mouth 2 (Two) Times a Day With Meals. (Patient taking differently: Take 500 mg by mouth 2 (Two) Times a Day With Meals. PRN), Disp: 180 tablet, Rfl: 1  •  rosuvastatin (Crestor) 5 MG tablet, Take 1 tablet by mouth Daily., Disp: 90 tablet, Rfl: 1  •  sildenafil (VIAGRA) 100 MG tablet, TAKE 1/2 - 1 TABLET BY MOUTH 1 HOUR PRIOR TO INTERCOURSE, Disp: 12 tablet, Rfl: 7  •  montelukast (SINGULAIR) 10 MG tablet, TAKE 1 TABLET BY MOUTH  EVERY DAY, Disp: 30 tablet, Rfl: 26    Problem List:  Patient Active Problem List   Diagnosis   • Multinodular goiter   • Hyperlipidemia   • Asthma   • Allergic rhinitis   • Basal cell carcinoma, scalp/neck   • Primary osteoarthritis involving multiple joints   • IPMN (intraductal papillary mucinous neoplasm)     Past Medical History:  Past Medical History:   Diagnosis Date   • Allergic rhinitis    • Asthma    • Fracture     MUTIPLE ARMS FXS, ARMS, FINGERS,TOES,LEGS, AND COLLARBONE ELEVATOR ACCIDENT   • Goiter     MULTI-NODULAR, no change for 2 years   • Hyperlipidemia    • Testicular hypofunction      Past Surgical History:  Past Surgical History:   Procedure Laterality Date   • CARPAL TUNNEL RELEASE Left 2010   • COLONOSCOPY     • COLONOSCOPY N/A 10/11/2021    Procedure: COLONOSCOPY;  Surgeon: Radha Leon MD;  Location: [facility] ENDOSCOPY;  Service: Gastroenterology;  Laterality: N/A;   • ENDOSCOPY     • KNEE ARTHROSCOPY Right    • SHOULDER ARTHROSCOPY Right    • SKIN LESION EXCISION N/A 7/8/2019    Procedure: SKIN LESION EXCISION  Posterior scalp x2 and left lower extremity;  Surgeon: Stephen Zamora MD;  Location: [facility] MAIN OR;  Service: General   • SKIN LESION EXCISION Left 7/15/2019    Procedure: SKIN LESION EXCISION;  Surgeon: Stephen Zamora MD;  Location: [facility] MAIN OR;  Service: General   • SPINE SURGERY  1990    STABALATIONS   • UPPER ENDOSCOPIC ULTRASOUND W/ FNA N/A 12/30/2021    Procedure: ENDOSCOPIC ULTRASOUND WITH FINE NEEDLE ASPIRATION OF PANCREATIC CYST;  Surgeon: Jennifer Balderas MD;  Location: [facility] ENDOSCOPY;  Service: Gastroenterology;  Laterality: N/A;  Post: PANCREATIC CYST     Social History:  Social History     Socioeconomic History   • Marital status:    Tobacco Use   • Smoking status: Never Smoker   • Smokeless tobacco: Never Used   Vaping Use   • Vaping Use: Never used   Substance and Sexual Activity   • Alcohol use: Yes     Alcohol/week: 1.0 standard drink     Types: 1  Cans of beer per week   • Drug use: No   • Sexual activity: Yes     Partners: Female     Allergies:  No Known Allergies  Immunizations:  Immunization History   Administered Date(s) Administered   • COVID-19 (PFIZER) PURPLE CAP 02/10/2021, 03/03/2021, 10/07/2021   • FLUAD TRI 65YR+ 11/19/2014, 04/17/2015   • Flu Vaccine Quad PF 6-35MO 12/14/2016   • Flu Vaccine Quad PF >36MO 12/14/2016   • Fluzone High Dose =>65 Years ([employer] ONLY) 01/30/2018, 10/28/2018, 03/15/2020   • Pneumococcal Conjugate 13-Valent (PCV13) 04/17/2015            In-Office Procedure(s):  No orders to display        ASCVD RIsk Score::  The 10-year ASCVD risk score (Kathy RYAN JrSamara, et al., 2013) is: 17.5%    Values used to calculate the score:      Age: 72 years      Sex: Male      Is Non- : No      Diabetic: No      Tobacco smoker: No      Systolic Blood Pressure: 122 mmHg      Is BP treated: No      HDL Cholesterol: 50 mg/dL      Total Cholesterol: 191 mg/dL    Imaging:    Results for orders placed during the hospital encounter of 03/07/22    XR Chest 2 View    Narrative  DATE OF EXAM:  3/7/2022 10:20 AM    PROCEDURE:  XR CHEST 2 VW-    INDICATIONS:  cough; J45.20-Mild intermittent asthma, uncomplicated    COMPARISON:  6/2/2020    TECHNIQUE:  Two radiologic views of the chest , PA and lateral were obtained.    FINDINGS:  Heart size and pulmonary vasculature are within normal limits. Lungs  clear. Costophrenic angles sharp. Old posterior right sixth through  eighth rib fractures    Impression  No active cardiopulmonary disease    Electronically Signed By-Herbert Shaver On:3/7/2022 10:49 AM  This report was finalized on [id number] by  Herbert Shaver, .       Results for orders placed during the hospital encounter of 04/21/22    CT Angiogram Chest Pulmonary Embolism    Narrative  CT ANGIOGRAM CHEST PULMONARY EMBOLISM-    Date of Exam: 4/21/2022 12:59 PM    Indication: PE suspected, low/intermediate prob, positive  D-dimer.  Cough. Shortness of breath.    Comparison: CT chest 4/16/2015, PA and lateral chest 3/7/2022    Technique: Serial and axial CT images of the chest were obtained  following the uneventful intravenous administration of 100 cc Isovue-370  contrast. Reconstructions in the coronal and sagittal planes were also  performed. In addition, a 3 D volume rendered image was obtained after  post processing. Automated exposure control and iterative reconstruction  methods were used.    FINDINGS:    No pulmonary embolism. No thoracic aortic aneurysm or dissection. Dense  coronary artery calcifications. Normal heart size. No pericardial  effusion. No pleural effusion. No pathologically enlarged lymph nodes.  Thyroid gland is within normal limits. Mild bronchial wall thickening is  demonstrated within the bilateral lower lobes, without overt mucus  plugging. Mild groundglass densities are seen within both lungs which  may represent the expiratory phase of the respiratory cycle or mild  generalized pneumonitis. However, no focal lung consolidation is  identified. There is no abnormal interstitial thickening or overt  evidence of pulmonary edema. There are multiple old right rib fractures.  Moderately advanced degenerative disc endplate changes are present  within the spine. No acute osseous abnormalities.There is fatty  infiltration of the pancreatic parenchyma. The remainder the imaged  upper abdominal organs have a normal appearance.    Impression  1. No pulmonary embolism.  2. Mild bronchial wall thickening in the bilateral lower lobes.  Correlate for bronchitis.  3. Mild groundglass densities are seen within both lungs, which may  represent expiratory phase of the respiratory cycle or mild nonspecific  pneumonitis. No dense lung consolidations are identified.  4. Dense coronary artery calcifications. Correlate with cardiac history.      Electronically Signed By-Roma Fraser MD On:4/21/2022 1:26 PM  This report was  finalized on [id number] by  Roma Fraser MD.      Results for orders placed during the hospital encounter of 04/21/22    CT Angiogram Chest Pulmonary Embolism    Narrative  CT ANGIOGRAM CHEST PULMONARY EMBOLISM-    Date of Exam: 4/21/2022 12:59 PM    Indication: PE suspected, low/intermediate prob, positive D-dimer.  Cough. Shortness of breath.    Comparison: CT chest 4/16/2015, PA and lateral chest 3/7/2022    Technique: Serial and axial CT images of the chest were obtained  following the uneventful intravenous administration of 100 cc Isovue-370  contrast. Reconstructions in the coronal and sagittal planes were also  performed. In addition, a 3 D volume rendered image was obtained after  post processing. Automated exposure control and iterative reconstruction  methods were used.    FINDINGS:    No pulmonary embolism. No thoracic aortic aneurysm or dissection. Dense  coronary artery calcifications. Normal heart size. No pericardial  effusion. No pleural effusion. No pathologically enlarged lymph nodes.  Thyroid gland is within normal limits. Mild bronchial wall thickening is  demonstrated within the bilateral lower lobes, without overt mucus  plugging. Mild groundglass densities are seen within both lungs which  may represent the expiratory phase of the respiratory cycle or mild  generalized pneumonitis. However, no focal lung consolidation is  identified. There is no abnormal interstitial thickening or overt  evidence of pulmonary edema. There are multiple old right rib fractures.  Moderately advanced degenerative disc endplate changes are present  within the spine. No acute osseous abnormalities.There is fatty  infiltration of the pancreatic parenchyma. The remainder the imaged  upper abdominal organs have a normal appearance.    Impression  1. No pulmonary embolism.  2. Mild bronchial wall thickening in the bilateral lower lobes.  Correlate for bronchitis.  3. Mild groundglass densities are seen within  both lungs, which may  represent expiratory phase of the respiratory cycle or mild nonspecific  pneumonitis. No dense lung consolidations are identified.  4. Dense coronary artery calcifications. Correlate with cardiac history.      Electronically Signed By-Roma Fraser MD On:4/21/2022 1:26 PM  This report was finalized on [id number] by  Roma Fraser MD.      Lab Review:   Lab on 04/19/2022   Component Date Value   • Amylase 04/19/2022 37    • Lipase 04/19/2022 19    • CA 19-9 04/19/2022 16.5    Lab on 04/19/2022   Component Date Value   • D-Dimer, Quantitative 04/19/2022 0.71 (A)   • proBNP 04/19/2022 48.9    • Glucose 04/19/2022 89    • BUN 04/19/2022 18    • Creatinine 04/19/2022 0.97    • Sodium 04/19/2022 142    • Potassium 04/19/2022 4.4    • Chloride 04/19/2022 107    • CO2 04/19/2022 25.0    • Calcium 04/19/2022 9.4    • Total Protein 04/19/2022 7.2    • Albumin 04/19/2022 4.60    • ALT (SGPT) 04/19/2022 25    • AST (SGOT) 04/19/2022 27    • Alkaline Phosphatase 04/19/2022 73    • Total Bilirubin 04/19/2022 0.5    • Globulin 04/19/2022 2.6    • A/G Ratio 04/19/2022 1.8    • BUN/Creatinine Ratio 04/19/2022 18.6    • Anion Gap 04/19/2022 10.0    • eGFR 04/19/2022 83.5    • Total Cholesterol 04/19/2022 191    • Triglycerides 04/19/2022 66    • HDL Cholesterol 04/19/2022 50    • LDL Cholesterol  04/19/2022 129 (A)   • VLDL Cholesterol 04/19/2022 12    • LDL/HDL Ratio 04/19/2022 2.56    • WBC 04/19/2022 4.70    • RBC 04/19/2022 4.12 (A)   • Hemoglobin 04/19/2022 13.2    • Hematocrit 04/19/2022 39.2    • MCV 04/19/2022 95.1    • MCH 04/19/2022 32.0    • MCHC 04/19/2022 33.7    • RDW 04/19/2022 12.5    • RDW-SD 04/19/2022 42.9    • MPV 04/19/2022 9.5    • Platelets 04/19/2022 165    • Neutrophil % 04/19/2022 55.3    • Lymphocyte % 04/19/2022 27.0    • Monocyte % 04/19/2022 9.6    • Eosinophil % 04/19/2022 6.6 (A)   • Basophil % 04/19/2022 1.1    • Immature Grans % 04/19/2022 0.4    • Neutrophils, Absolute  04/19/2022 2.60    • Lymphocytes, Absolute 04/19/2022 1.27    • Monocytes, Absolute 04/19/2022 0.45    • Eosinophils, Absolute 04/19/2022 0.31    • Basophils, Absolute 04/19/2022 0.05    • Immature Grans, Absolute 04/19/2022 0.02    • nRBC 04/19/2022 0.0    Hospital Outpatient Visit on 03/01/2022   Component Date Value   • Creatinine 03/01/2022 1.00    • eGFR 03/01/2022 80.5    Admission on 12/30/2021, Discharged on 12/30/2021   Component Date Value   • Amylase, Fluid 12/30/2021 31    Lab on 12/29/2021   Component Date Value   • COVID19 12/29/2021 Not Detected      Recent labs reviewed and interpreted for clinical significance and application            Level of Care:           Marcelo Cody MD  06/06/22  .

## 2022-06-13 ENCOUNTER — TELEPHONE (OUTPATIENT)
Dept: CARDIOLOGY | Facility: CLINIC | Age: 72
End: 2022-06-13

## 2022-06-22 ENCOUNTER — TELEPHONE (OUTPATIENT)
Dept: CARDIOLOGY | Facility: CLINIC | Age: 72
End: 2022-06-22

## 2022-06-22 NOTE — TELEPHONE ENCOUNTER
----- Message from Oscar Mederos sent at 6/22/2022 10:35 AM EDT -----  Hub notified patient to schedule the CT Cardiac Calcuim Score that Dr. Cody ordered and the patient would like a call from the office, confused on why this exam was ordered.

## 2022-06-22 NOTE — TELEPHONE ENCOUNTER
Duplicate message. See other message. Multiple attempts contacting patient to discuss with no return call.

## 2022-06-29 RX ORDER — SILDENAFIL 100 MG/1
TABLET, FILM COATED ORAL
Qty: 12 TABLET | Refills: 7 | Status: SHIPPED | OUTPATIENT
Start: 2022-06-29

## 2022-09-30 RX ORDER — ROSUVASTATIN CALCIUM 5 MG/1
TABLET, COATED ORAL
Qty: 30 TABLET | Refills: 5 | Status: SHIPPED | OUTPATIENT
Start: 2022-09-30

## 2022-09-30 RX ORDER — ASPIRIN 81 MG/1
TABLET, COATED ORAL
Qty: 30 TABLET | Refills: 5 | Status: SHIPPED | OUTPATIENT
Start: 2022-09-30

## 2022-10-10 ENCOUNTER — OFFICE VISIT (OUTPATIENT)
Dept: FAMILY MEDICINE CLINIC | Facility: CLINIC | Age: 72
End: 2022-10-10

## 2022-10-10 VITALS
TEMPERATURE: 96.9 F | OXYGEN SATURATION: 97 % | SYSTOLIC BLOOD PRESSURE: 137 MMHG | RESPIRATION RATE: 16 BRPM | DIASTOLIC BLOOD PRESSURE: 85 MMHG | BODY MASS INDEX: 32.1 KG/M2 | HEIGHT: 72 IN | HEART RATE: 71 BPM | WEIGHT: 237 LBS

## 2022-10-10 DIAGNOSIS — Z23 NEED FOR IMMUNIZATION AGAINST INFLUENZA: ICD-10-CM

## 2022-10-10 DIAGNOSIS — E78.5 HYPERLIPIDEMIA, UNSPECIFIED HYPERLIPIDEMIA TYPE: Chronic | ICD-10-CM

## 2022-10-10 DIAGNOSIS — M15.9 PRIMARY OSTEOARTHRITIS INVOLVING MULTIPLE JOINTS: ICD-10-CM

## 2022-10-10 DIAGNOSIS — J45.20 MILD INTERMITTENT ASTHMA WITHOUT COMPLICATION: Primary | Chronic | ICD-10-CM

## 2022-10-10 DIAGNOSIS — D49.0 IPMN (INTRADUCTAL PAPILLARY MUCINOUS NEOPLASM): ICD-10-CM

## 2022-10-10 DIAGNOSIS — J30.89 NON-SEASONAL ALLERGIC RHINITIS DUE TO FUNGAL SPORES: Chronic | ICD-10-CM

## 2022-10-10 PROCEDURE — G0008 ADMIN INFLUENZA VIRUS VAC: HCPCS | Performed by: FAMILY MEDICINE

## 2022-10-10 PROCEDURE — 99214 OFFICE O/P EST MOD 30 MIN: CPT | Performed by: FAMILY MEDICINE

## 2022-10-10 PROCEDURE — 90662 IIV NO PRSV INCREASED AG IM: CPT | Performed by: FAMILY MEDICINE

## 2022-10-10 NOTE — PROGRESS NOTES
"Subjective   Jim Malik is a 72 y.o. male.     Chief Complaint   Patient presents with   • Hyperlipidemia     6 mth f/u       HPI  Chief complaint: Allergic rhinitis/asthma hyperlipidemia osteoarthritis pancreatic neoplasm    The patient is a 72-year-old white male comes in for follow-up and maintenance of his current problems which include    1.  Allergic rhinitis/asthma-stable-the patient is currently on Singulair 10 mg daily Symbicort 160/4.52 puffs twice a day rescue inhaler.  Patient denied cough shortness of breath or wheezing of late.    2.  Hyperlipidemia-stable-patient on Crestor 5 mg daily.  Denies myalgias and arthralgias.  No nausea or anorexia.      3. intraductal papillary mucinous neoplasm of the pancreas-stable-the patient is asymptomatic.  He is being followed by gastroenterology for this.  He denied nausea anorexia or weight loss.    4.  Osteoarthritis/cervical radiculitis-deteriorated-patient complains of pain in the right lateral neck area that is aggravated by turning his head to the right.  The patient's been evaluated by neurosurgery.  He states he has been having worse symptoms of late.  He denied weakness or bowel or bladder problems.        The following portions of the patient's history were reviewed and updated as appropriate: allergies, current medications, past family history, past medical history, past social history, past surgical history and problem list.    Review of Systems    Objective     /85 (BP Location: Left arm, Patient Position: Sitting, Cuff Size: Adult)   Pulse 71   Temp 96.9 °F (36.1 °C) (Infrared)   Resp 16   Ht 182.9 cm (72\")   Wt 108 kg (237 lb)   SpO2 97%   BMI 32.14 kg/m²     Physical Exam  Vitals and nursing note reviewed.   Constitutional:       Appearance: He is well-developed.   HENT:      Head: Normocephalic and atraumatic.   Eyes:      Pupils: Pupils are equal, round, and reactive to light.   Neck:      Comments: Patient has decreased range of " motion of the neck.    He has slight decrease in extension and flexion of the neck.  Patient is unable to turn his head to the right without causing pain in the right trapezius muscle area.    He has pain at the attachment of the cervical strap muscles to the base of the skull on the right.  Cardiovascular:      Rate and Rhythm: Normal rate and regular rhythm.      Pulses: Normal pulses.      Heart sounds: Normal heart sounds. No murmur heard.    No friction rub. No gallop.   Pulmonary:      Effort: Pulmonary effort is normal.      Breath sounds: Normal breath sounds.   Abdominal:      General: Bowel sounds are normal.      Palpations: Abdomen is soft.   Musculoskeletal:         General: Normal range of motion.      Cervical back: Neck supple.   Skin:     General: Skin is warm and dry.   Neurological:      Mental Status: He is alert and oriented to person, place, and time.   Psychiatric:         Behavior: Behavior normal.         Thought Content: Thought content normal.         Judgment: Judgment normal.           Assessment & Plan   Diagnoses and all orders for this visit:    1. Mild intermittent asthma without complication (Primary)    2. Non-seasonal allergic rhinitis due to fungal spores    3. IPMN (intraductal papillary mucinous neoplasm)  -     CBC & Differential; Future  -     Comprehensive Metabolic Panel; Future    4. Primary osteoarthritis involving multiple joints-The patient is scheduled to see neurosurgery for further evaluation and treatment.  He is to call if he develop weakness or bowel or bladder problems or clumsy hand syndrome    5. Hyperlipidemia, unspecified hyperlipidemia type  -     Lipid Panel; Future      Patient Instructions   Continue your current medications and treatment.    Follow up in the office in 6 months.    Have the follow up labs done and call for results.          Naeem Mehta Jr., MD    10/10/22

## 2022-10-11 ENCOUNTER — LAB (OUTPATIENT)
Dept: LAB | Facility: HOSPITAL | Age: 72
End: 2022-10-11

## 2022-10-11 DIAGNOSIS — D49.0 IPMN (INTRADUCTAL PAPILLARY MUCINOUS NEOPLASM): ICD-10-CM

## 2022-10-11 DIAGNOSIS — E78.5 HYPERLIPIDEMIA, UNSPECIFIED HYPERLIPIDEMIA TYPE: Chronic | ICD-10-CM

## 2022-10-11 LAB
ALBUMIN SERPL-MCNC: 4.4 G/DL (ref 3.5–5.2)
ALBUMIN/GLOB SERPL: 1.5 G/DL
ALP SERPL-CCNC: 77 U/L (ref 39–117)
ALT SERPL W P-5'-P-CCNC: 18 U/L (ref 1–41)
ANION GAP SERPL CALCULATED.3IONS-SCNC: 10.9 MMOL/L (ref 5–15)
AST SERPL-CCNC: 25 U/L (ref 1–40)
BASOPHILS # BLD AUTO: 0.04 10*3/MM3 (ref 0–0.2)
BASOPHILS NFR BLD AUTO: 0.8 % (ref 0–1.5)
BILIRUB SERPL-MCNC: 0.6 MG/DL (ref 0–1.2)
BUN SERPL-MCNC: 22 MG/DL (ref 8–23)
BUN/CREAT SERPL: 20.6 (ref 7–25)
CALCIUM SPEC-SCNC: 10.2 MG/DL (ref 8.6–10.5)
CHLORIDE SERPL-SCNC: 104 MMOL/L (ref 98–107)
CHOLEST SERPL-MCNC: 161 MG/DL (ref 0–200)
CO2 SERPL-SCNC: 28.1 MMOL/L (ref 22–29)
CREAT SERPL-MCNC: 1.07 MG/DL (ref 0.76–1.27)
DEPRECATED RDW RBC AUTO: 39.5 FL (ref 37–54)
EGFRCR SERPLBLD CKD-EPI 2021: 74.2 ML/MIN/1.73
EOSINOPHIL # BLD AUTO: 0.18 10*3/MM3 (ref 0–0.4)
EOSINOPHIL NFR BLD AUTO: 3.6 % (ref 0.3–6.2)
ERYTHROCYTE [DISTWIDTH] IN BLOOD BY AUTOMATED COUNT: 12 % (ref 12.3–15.4)
GLOBULIN UR ELPH-MCNC: 2.9 GM/DL
GLUCOSE SERPL-MCNC: 73 MG/DL (ref 65–99)
HCT VFR BLD AUTO: 38.4 % (ref 37.5–51)
HDLC SERPL-MCNC: 56 MG/DL (ref 40–60)
HGB BLD-MCNC: 13.2 G/DL (ref 13–17.7)
IMM GRANULOCYTES # BLD AUTO: 0.01 10*3/MM3 (ref 0–0.05)
IMM GRANULOCYTES NFR BLD AUTO: 0.2 % (ref 0–0.5)
LDLC SERPL CALC-MCNC: 89 MG/DL (ref 0–100)
LDLC/HDLC SERPL: 1.58 {RATIO}
LYMPHOCYTES # BLD AUTO: 1.24 10*3/MM3 (ref 0.7–3.1)
LYMPHOCYTES NFR BLD AUTO: 24.7 % (ref 19.6–45.3)
MCH RBC QN AUTO: 31.5 PG (ref 26.6–33)
MCHC RBC AUTO-ENTMCNC: 34.4 G/DL (ref 31.5–35.7)
MCV RBC AUTO: 91.6 FL (ref 79–97)
MONOCYTES # BLD AUTO: 0.51 10*3/MM3 (ref 0.1–0.9)
MONOCYTES NFR BLD AUTO: 10.1 % (ref 5–12)
NEUTROPHILS NFR BLD AUTO: 3.05 10*3/MM3 (ref 1.7–7)
NEUTROPHILS NFR BLD AUTO: 60.6 % (ref 42.7–76)
NRBC BLD AUTO-RTO: 0 /100 WBC (ref 0–0.2)
PLATELET # BLD AUTO: 183 10*3/MM3 (ref 140–450)
PMV BLD AUTO: 9.3 FL (ref 6–12)
POTASSIUM SERPL-SCNC: 4.7 MMOL/L (ref 3.5–5.2)
PROT SERPL-MCNC: 7.3 G/DL (ref 6–8.5)
RBC # BLD AUTO: 4.19 10*6/MM3 (ref 4.14–5.8)
SODIUM SERPL-SCNC: 143 MMOL/L (ref 136–145)
TRIGL SERPL-MCNC: 84 MG/DL (ref 0–150)
VLDLC SERPL-MCNC: 16 MG/DL (ref 5–40)
WBC NRBC COR # BLD: 5.03 10*3/MM3 (ref 3.4–10.8)

## 2022-10-11 PROCEDURE — 36415 COLL VENOUS BLD VENIPUNCTURE: CPT

## 2022-10-11 PROCEDURE — 85025 COMPLETE CBC W/AUTO DIFF WBC: CPT

## 2022-10-11 PROCEDURE — 80061 LIPID PANEL: CPT

## 2022-10-11 PROCEDURE — 80053 COMPREHEN METABOLIC PANEL: CPT

## 2022-10-15 RX ORDER — NAPROXEN 500 MG/1
TABLET ORAL
Qty: 60 TABLET | Refills: 5 | Status: SHIPPED | OUTPATIENT
Start: 2022-10-15

## 2022-11-01 ENCOUNTER — TRANSCRIBE ORDERS (OUTPATIENT)
Dept: ADMINISTRATIVE | Facility: HOSPITAL | Age: 72
End: 2022-11-01

## 2022-11-01 DIAGNOSIS — G89.29 CHRONIC RADICULAR LOW BACK PAIN: ICD-10-CM

## 2022-11-01 DIAGNOSIS — M54.16 CHRONIC RADICULAR LOW BACK PAIN: ICD-10-CM

## 2022-11-01 DIAGNOSIS — M54.6 CHRONIC THORACIC BACK PAIN, UNSPECIFIED BACK PAIN LATERALITY: ICD-10-CM

## 2022-11-01 DIAGNOSIS — M54.2 NECK PAIN: Primary | ICD-10-CM

## 2022-11-01 DIAGNOSIS — G89.29 CHRONIC THORACIC BACK PAIN, UNSPECIFIED BACK PAIN LATERALITY: ICD-10-CM

## 2022-11-18 ENCOUNTER — ANESTHESIA EVENT (OUTPATIENT)
Dept: GASTROENTEROLOGY | Facility: HOSPITAL | Age: 72
End: 2022-11-18

## 2022-11-18 ENCOUNTER — ON CAMPUS - OUTPATIENT (OUTPATIENT)
Dept: URBAN - METROPOLITAN AREA HOSPITAL 85 | Facility: HOSPITAL | Age: 72
End: 2022-11-18
Payer: COMMERCIAL

## 2022-11-18 ENCOUNTER — HOSPITAL ENCOUNTER (OUTPATIENT)
Facility: HOSPITAL | Age: 72
Setting detail: HOSPITAL OUTPATIENT SURGERY
Discharge: HOME | End: 2022-11-18
Attending: INTERNAL MEDICINE | Admitting: INTERNAL MEDICINE

## 2022-11-18 ENCOUNTER — ANESTHESIA (OUTPATIENT)
Dept: GASTROENTEROLOGY | Facility: HOSPITAL | Age: 72
End: 2022-11-18

## 2022-11-18 VITALS
DIASTOLIC BLOOD PRESSURE: 84 MMHG | HEART RATE: 65 BPM | WEIGHT: 234.57 LBS | BODY MASS INDEX: 31.77 KG/M2 | RESPIRATION RATE: 16 BRPM | SYSTOLIC BLOOD PRESSURE: 117 MMHG | TEMPERATURE: 98.6 F | HEIGHT: 72 IN | OXYGEN SATURATION: 95 %

## 2022-11-18 DIAGNOSIS — K86.2 CYST OF PANCREAS: ICD-10-CM

## 2022-11-18 DIAGNOSIS — K86.2 PANCREATIC CYST: ICD-10-CM

## 2022-11-18 PROCEDURE — 43242 EGD US FINE NEEDLE BX/ASPIR: CPT | Performed by: INTERNAL MEDICINE

## 2022-11-18 PROCEDURE — 82378 CARCINOEMBRYONIC ANTIGEN: CPT | Performed by: INTERNAL MEDICINE

## 2022-11-18 PROCEDURE — 25010000002 LEVOFLOXACIN PER 250 MG: Performed by: ANESTHESIOLOGY

## 2022-11-18 PROCEDURE — 25010000002 PROPOFOL 1000 MG/100ML EMULSION: Performed by: ANESTHESIOLOGY

## 2022-11-18 RX ORDER — PROPOFOL 10 MG/ML
INJECTION, EMULSION INTRAVENOUS AS NEEDED
Status: DISCONTINUED | OUTPATIENT
Start: 2022-11-18 | End: 2022-11-18 | Stop reason: SURG

## 2022-11-18 RX ORDER — LEVOFLOXACIN 5 MG/ML
INJECTION, SOLUTION INTRAVENOUS
Status: COMPLETED
Start: 2022-11-18 | End: 2022-11-18

## 2022-11-18 RX ORDER — METRONIDAZOLE 500 MG/100ML
INJECTION, SOLUTION INTRAVENOUS AS NEEDED
Status: DISCONTINUED | OUTPATIENT
Start: 2022-11-18 | End: 2022-11-18 | Stop reason: SURG

## 2022-11-18 RX ORDER — METRONIDAZOLE 500 MG/100ML
INJECTION, SOLUTION INTRAVENOUS
Status: COMPLETED
Start: 2022-11-18 | End: 2022-11-18

## 2022-11-18 RX ORDER — SODIUM CHLORIDE 9 MG/ML
9 INJECTION, SOLUTION INTRAVENOUS ONCE
Status: COMPLETED | OUTPATIENT
Start: 2022-11-18 | End: 2022-11-18

## 2022-11-18 RX ORDER — SODIUM CHLORIDE 9 MG/ML
INJECTION, SOLUTION INTRAVENOUS CONTINUOUS PRN
Status: DISCONTINUED | OUTPATIENT
Start: 2022-11-18 | End: 2022-11-18 | Stop reason: SURG

## 2022-11-18 RX ORDER — LEVOFLOXACIN 5 MG/ML
INJECTION, SOLUTION INTRAVENOUS AS NEEDED
Status: DISCONTINUED | OUTPATIENT
Start: 2022-11-18 | End: 2022-11-18 | Stop reason: SURG

## 2022-11-18 RX ADMIN — SODIUM CHLORIDE 9 ML/HR: 9 INJECTION, SOLUTION INTRAVENOUS at 11:36

## 2022-11-18 RX ADMIN — PROPOFOL INJECTABLE EMULSION 50 MG: 10 INJECTION, EMULSION INTRAVENOUS at 12:41

## 2022-11-18 RX ADMIN — METRONIDAZOLE 500 MG: 500 INJECTION, SOLUTION INTRAVENOUS at 12:43

## 2022-11-18 RX ADMIN — LEVOFLOXACIN 500 MG: 500 INJECTION, SOLUTION INTRAVENOUS at 12:57

## 2022-11-18 RX ADMIN — SODIUM CHLORIDE: 0.9 INJECTION, SOLUTION INTRAVENOUS at 12:27

## 2022-11-18 RX ADMIN — PROPOFOL INJECTABLE EMULSION 50 MG: 10 INJECTION, EMULSION INTRAVENOUS at 12:44

## 2022-11-18 RX ADMIN — PROPOFOL INJECTABLE EMULSION 100 MCG/KG/MIN: 10 INJECTION, EMULSION INTRAVENOUS at 12:32

## 2022-11-18 RX ADMIN — PROPOFOL INJECTABLE EMULSION 75 MG: 10 INJECTION, EMULSION INTRAVENOUS at 12:31

## 2022-11-18 RX ADMIN — PROPOFOL INJECTABLE EMULSION 50 MG: 10 INJECTION, EMULSION INTRAVENOUS at 12:34

## 2022-11-18 NOTE — ANESTHESIA PREPROCEDURE EVALUATION
Anesthesia Evaluation     Patient summary reviewed and Nursing notes reviewed   NPO Solid Status: > 8 hours  NPO Liquid Status: > 8 hours           Airway   Mallampati: II  TM distance: >3 FB  Neck ROM: full  No difficulty expected  Dental - normal exam     Pulmonary - normal exam   (+) asthma,  Cardiovascular - normal exam    (+) hyperlipidemia,       Neuro/Psych  GI/Hepatic/Renal/Endo    (+) obesity,       ROS Comment: Pancreatic cyst    Musculoskeletal     Abdominal  - normal exam   Substance History      OB/GYN          Other   arthritis,    history of cancer                      Anesthesia Plan    ASA 2     MAC   total IV anesthesia  intravenous induction     Anesthetic plan, risks, benefits, and alternatives have been provided, discussed and informed consent has been obtained with: patient.  Pre-procedure education provided  Plan discussed with CAA and CRNA.

## 2022-11-18 NOTE — ANESTHESIA POSTPROCEDURE EVALUATION
Patient: Jim Malik    Procedure Summary     Date: 11/18/22 Room / Location: [facility] ENDOSCOPY 2 / [facility] ENDOSCOPY    Anesthesia Start: 1224 Anesthesia Stop: 1307    Procedure: ENDOSCOPIC ULTRASOUND aspiration of pancreatic cyst Diagnosis:       Pancreatic cyst      (Pancreatic cyst [K86.2])    Surgeons: Jennifer Balderas MD Provider: Ethan Cadet MD    Anesthesia Type: MAC ASA Status: 2          Anesthesia Type: MAC    Vitals  Vitals Value Taken Time   /84 11/18/22 1334   Temp     Pulse 65 11/18/22 1334   Resp 16 11/18/22 1334   SpO2 95 % 11/18/22 1334           Post Anesthesia Care and Evaluation    Patient location during evaluation: PACU  Patient participation: complete - patient participated  Level of consciousness: awake  Pain score: 0  Pain management: adequate    Airway patency: patent  Anesthetic complications: No anesthetic complications  PONV Status: none  Cardiovascular status: acceptable  Respiratory status: acceptable  Hydration status: acceptable    Comments: Patient seen and examined postoperatively; vital signs stable; SpO2 greater than or equal to 90%; cardiopulmonary status stable; nausea/vomiting adequately controlled; pain adequately controlled; no apparent anesthesia complications; patient discharged from anesthesia care when discharge criteria were met

## 2022-11-18 NOTE — DISCHARGE INSTRUCTIONS
A responsible adult should stay with you and you should rest quietly for the rest of the day.    Do not drink alcohol, drive, operate any heavy machinery or power tools or make any legal/important decisions for the next 24 hours.     Progress your diet as tolerated.  If you begin to experience severe pain, increased shortness of breath, racing heartbeat or a fever above 101 F, seek immediate medical attention.     Follow up with MD as instructed. Call office for results in 3 to 5 days if needed.    [phone]    Impression:  1.  Normal pancreatic parenchymal EUS without any finding of chronic pancreatitis except some fatty infiltration of the body of the pancreas area.  PD and CBD was not dilated.  2.  Small cyst cystic lesion around 8.5 mm was noticed at the body of the pancreas which was aspirated as well as the largest cyst close to 1.9 to 2 cm at the uncinate process was also aspirated and fluid was sent for CEA amylase and lipase.  3.  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  So no alarming features were present to suggest malignant transformation of the cyst.  Await CEA     Recommendations:  Patient to follow with GI clinic for any abdominal pain as scheduled  Continue with the PPI.  Follow with a CEA level.  Follow the GI clinic in  8 weeks.  Depending on the CEA level, further interval imaging follow-up will be determined

## 2022-11-18 NOTE — OP NOTE
ESOPHAGOGASTRODUODENOSCOPY WITH ULTRASOUND AND FINE NEEDLE ASPIRATION Procedure Report    Patient Name:  Jim Malik  YOB: 1950    Date of Surgery:  11/18/2022     Pre-Op Diagnosis:  Pancreatic cyst [K86.2]       Post-Op Diagnosis Codes:     * Pancreatic cyst [K86.2]      Procedure/CPT® Codes:      Procedure(s):  ENDOSCOPIC ULTRASOUND aspiration of pancreatic cyst    Staff:  Surgeon(s):  Jennifer Balderas MD      Anesthesia: Monitored Anesthesia Care    Description of Procedure:  A description of the procedure as well as risks, benefits and alternative methods were explained to the patient who voiced understanding and signed the corresponding consent form. A physical exam was performed and vital signs were monitored throughout the procedure.    Initially Pentax linear scope was advanced under direct realization from oropharynx, esophagus stomach and the second part duodenum.  Limited evaluation of the esophageal gastric antral mucosa looks normal.    Scanning of the body and the tail of the pancreas showed fatty infiltration of the body and the tail of the pancreas, there was almost close to 8.5 mm anechoic elongated cystic lesion in the body of the pancreas.  There was another cyst around 1.9 to 2 cm at the uncinate process.  Common bile duct and the pancreatic duct was not dilated.  PD is around 1.3 mm in the body area close to 2.5 mm in the head of the pancreas area.  Common bile duct is not dilated.  No other mass or lesion was seen.  No changes of chronic pancreatitis was noticed.  22-gauge needle was used and cyst was aspirated fluid was sent for CEA amylase and lipase.  Mention to the nursing staff very clear instruction that the specimen should be carefully taken to the lab and [facility] will be informed that his previous specimen was lost.    Impression:  1.  Normal pancreatic parenchymal EUS without any finding of chronic pancreatitis except some fatty infiltration of the body of the  pancreas area.  PD and CBD was not dilated.  2.  Small cyst cystic lesion around 8.5 mm was noticed at the body of the pancreas which was aspirated as well as the largest cyst close to 1.9 to 2 cm at the uncinate process was also aspirated and fluid was sent for CEA amylase and lipase.  3.  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  So no alarming features were present to suggest malignant transformation of the cyst.  Await CEA    Recommendations:  1. Patient to follow with GI clinic for any abdominal pain as scheduled  2. Continue with the PPI.  3. Follow with a CEA level.  4. Follow the GI clinic in  8 weeks.  5. Depending on the CEA level, further interval imaging follow-up will be determined        Jennifer Balderas MD     Date: 11/18/2022    Time: 13:05 EST

## 2022-11-18 NOTE — H&P
Patient Care Team:  Naeem Mehta Jr., MD as PCP - General (Family Medicine)      Subjective .     History of present illness:    Jim Malik is a 72 y.o. male who presents today for Procedure(s):  ENDOSCOPIC ULTRASOUND for the indications listed below.     The updated Patient Profile was reviewed prior to the procedure, in conjunction with the Physical Exam, including medical conditions, surgical procedures, medications, allergies, family history and social history.     Pre-operatively, I reviewed the indication(s) for the procedure, the risks of the procedure [including but not limited to: unexpected bleeding possibly requiring hospitalization and/or unplanned repeat procedures, perforation possibly requiring surgical treatment, missed lesions and complications of sedation/MAC (also explained by anesthesia staff)].     I have evaluated the patient for risks associated with the planned anesthesia and the procedure to be performed and find the patient an acceptable candidate for IV sedation.    Multiple opportunities were provided for any questions or concerns, and all questions were answered satisfactorily before any anesthesia was administered. We will proceed with the planned procedure.      ASSESSMENT/PLAN:  72 years old male with a pancreatic head cyst  EUS      Past Medical History:  Past Medical History:   Diagnosis Date   • Allergic rhinitis    • Asthma    • Fracture     MUTIPLE ARMS FXS, ARMS, FINGERS,TOES,LEGS, AND COLLARBONE ELEVATOR ACCIDENT   • Goiter     MULTI-NODULAR, no change for 2 years   • Hyperlipidemia    • Testicular hypofunction        Past Surgical History:  Past Surgical History:   Procedure Laterality Date   • CARPAL TUNNEL RELEASE Left 2010   • COLONOSCOPY     • COLONOSCOPY N/A 10/11/2021    Procedure: COLONOSCOPY;  Surgeon: Radha Leon MD;  Location: [facility] ENDOSCOPY;  Service: Gastroenterology;  Laterality: N/A;   • ENDOSCOPY     • KNEE ARTHROSCOPY Right    • SHOULDER  ARTHROSCOPY Right    • SKIN LESION EXCISION N/A 7/8/2019    Procedure: SKIN LESION EXCISION  Posterior scalp x2 and left lower extremity;  Surgeon: Stephen Zamora MD;  Location: [facility] MAIN OR;  Service: General   • SKIN LESION EXCISION Left 7/15/2019    Procedure: SKIN LESION EXCISION;  Surgeon: Stephen Zamora MD;  Location: [facility] MAIN OR;  Service: General   • SPINE SURGERY  1990    STABALATIONS   • UPPER ENDOSCOPIC ULTRASOUND W/ FNA N/A 12/30/2021    Procedure: ENDOSCOPIC ULTRASOUND WITH FINE NEEDLE ASPIRATION OF PANCREATIC CYST;  Surgeon: Jennifer Balderas MD;  Location: [facility] ENDOSCOPY;  Service: Gastroenterology;  Laterality: N/A;  Post: PANCREATIC CYST       Social History:  Social History     Tobacco Use   • Smoking status: Never   • Smokeless tobacco: Never   Vaping Use   • Vaping Use: Never used   Substance Use Topics   • Alcohol use: Yes     Alcohol/week: 1.0 standard drink     Types: 1 Cans of beer per week   • Drug use: No       Family History:  Family History   Problem Relation Age of Onset   • No Known Problems Mother    • Diabetes Brother    • Hypertension Brother    • Hyperlipidemia Brother        Medications:  Medications Prior to Admission   Medication Sig Dispense Refill Last Dose   • Aspirin Low Dose 81 MG EC tablet TAKE 1 TABLET BY MOUTH EVERY DAY 30 tablet 5 Past Week   • budesonide-formoterol (Symbicort) 160-4.5 MCG/ACT inhaler Inhale 2 puffs 2 (Two) Times a Day. 90 each 3 11/18/2022   • montelukast (SINGULAIR) 10 MG tablet TAKE 1 TABLET BY MOUTH EVERY DAY 30 tablet 26 11/18/2022   • naproxen (NAPROSYN) 500 MG tablet TAKE 1 TABLET BY MOUTH TWICE A DAY WITH MEALS 60 tablet 5 Past Month   • rosuvastatin (CRESTOR) 5 MG tablet TAKE 1 TABLET BY MOUTH EVERY DAY 30 tablet 5 11/18/2022   • sildenafil (VIAGRA) 100 MG tablet TAKE 1/2 - 1 TABLET BY MOUTH 1 HOUR PRIOR TO INTERCOURSE 12 tablet 7 Past Month   • albuterol sulfate HFA (Proventil HFA) 108 (90 Base) MCG/ACT inhaler Inhale 2 puffs Every 4  (Four) Hours As Needed for Wheezing. 8 g 5 More than a month       Scheduled Meds:  Continuous Infusions:No current facility-administered medications for this encounter.    PRN Meds:.    ALLERGIES:  Patient has no known allergies.        Objective     Vital Signs:   Temp:  [98.6 °F (37 °C)] 98.6 °F (37 °C)  Heart Rate:  [82] 82  Resp:  [8] 8  BP: (141)/(74) 141/74    Physical Exam:      General Appearance:    Awake and alert, in no acute distress   Lungs:     Clear to auscultation bilaterally, respirations regular, even and unlabored    Heart:    Regular rhythm and normal rate, normal S1 and S2, no            murmur, no gallop, no rub   Abdomen:     Normal bowel sounds, soft, non-tender, no rebound or guarding, non-distended, no hepatosplenomegaly        Results Review:   I reviewed the patient's labs and imaging.  Lab Results (last 24 hours)     ** No results found for the last 24 hours. **          Imaging Results (Last 24 Hours)     ** No results found for the last 24 hours. **             I discussed the patients findings and my recommendations with the patient.  Jennifer Balderas MD  11/18/22  13:01 EST

## 2022-11-22 LAB — REF LAB TEST METHOD: NORMAL

## 2023-01-25 ENCOUNTER — OFFICE VISIT (OUTPATIENT)
Dept: NEUROSURGERY | Facility: CLINIC | Age: 73
End: 2023-01-25
Payer: MEDICARE

## 2023-01-25 ENCOUNTER — HOSPITAL ENCOUNTER (OUTPATIENT)
Dept: GENERAL RADIOLOGY | Facility: HOSPITAL | Age: 73
Discharge: HOME | End: 2023-01-25
Admitting: NEUROLOGICAL SURGERY
Payer: MEDICARE

## 2023-01-25 VITALS
OXYGEN SATURATION: 97 % | WEIGHT: 243.2 LBS | BODY MASS INDEX: 32.94 KG/M2 | SYSTOLIC BLOOD PRESSURE: 163 MMHG | DIASTOLIC BLOOD PRESSURE: 97 MMHG | HEIGHT: 72 IN | HEART RATE: 79 BPM

## 2023-01-25 DIAGNOSIS — M43.12 ACQUIRED SPONDYLOLISTHESIS OF CERVICAL VERTEBRA: Primary | ICD-10-CM

## 2023-01-25 DIAGNOSIS — M50.30 DDD (DEGENERATIVE DISC DISEASE), CERVICAL: ICD-10-CM

## 2023-01-25 DIAGNOSIS — M43.16 SPONDYLOLISTHESIS OF LUMBAR REGION: ICD-10-CM

## 2023-01-25 DIAGNOSIS — M48.061 LUMBAR STENOSIS WITHOUT NEUROGENIC CLAUDICATION: ICD-10-CM

## 2023-01-25 DIAGNOSIS — M43.12 ACQUIRED SPONDYLOLISTHESIS OF CERVICAL VERTEBRA: ICD-10-CM

## 2023-01-25 DIAGNOSIS — M54.16 LUMBAR RADICULOPATHY: ICD-10-CM

## 2023-01-25 DIAGNOSIS — M51.36 LUMBAR DEGENERATIVE DISC DISEASE: ICD-10-CM

## 2023-01-25 PROCEDURE — 72040 X-RAY EXAM NECK SPINE 2-3 VW: CPT

## 2023-01-25 PROCEDURE — 99204 OFFICE O/P NEW MOD 45 MIN: CPT | Performed by: NEUROLOGICAL SURGERY

## 2023-01-25 NOTE — PROGRESS NOTES
"Subjective   History of Present Illness: Jim Malik is a 72 y.o. male is being seen for consultation today as a self referral for neck pain. In the office today patient reports his neck pain travels up into the occipital area of his skull,right side greater than left.  In terms of the patient's neck, he has pain throughout his neck banding across the shoulders and into his skull.  Patient denies any pain numbness tingling or weakness into his upper extremities.  Patient is undergone ablations which provided about 2 weeks of relief.  In terms of the patient's low back, he underwent surgery 30 years ago apparently a decompression and fusion.  He complains of pain radiating down his lower extremities as well as back pain.  Overall he feels that the neck pain is much more severe than the low back issues and he can live with the low back issues at this time.  No bowel or bladder issues.  No weakness.    Chief Complaint   Patient presents with   • Neck Pain     New evaluation          Previous Treatment: Cervical ablations    The following portions of the patient's history were reviewed and updated as appropriate: allergies, current medications, past family history, past medical history, past social history, past surgical history and problem list.    Review of Systems   Constitutional: Positive for activity change.   Eyes: Negative.    Respiratory: Negative.    Cardiovascular: Negative.    Gastrointestinal: Negative.    Endocrine: Negative.    Genitourinary: Negative.    Musculoskeletal: Positive for neck pain.   Skin: Negative.    Allergic/Immunologic: Negative.    Neurological: Positive for headaches. Negative for weakness and numbness.   Hematological: Negative.    Psychiatric/Behavioral: Positive for sleep disturbance.       Objective     /97   Pulse 79   Ht 182.9 cm (72\")   Wt 110 kg (243 lb 3.2 oz)   SpO2 97%   BMI 32.98 kg/m²    Body mass index is 32.98 kg/m².      Neurologic Exam     Mental Status "   Oriented to person, place, and time.     Sensory Exam   Light touch normal.     Gait, Coordination, and Reflexes     Reflexes   Right Jerome: absent  Left Jerome: absent      Assessment & Plan   Independent Review of Radiographic Studies:      I personally reviewed and interpreted the images from the following studies.    MRI cervical spine: Multilevel severe degenerative changes from C2-C7.  There is mild spondylolisthesis of C2-3.  There is spondylolisthesis of C3-4 with right foraminal stenosis.  No other high-grade central or foraminal stenosis    MRI lumbar spine: Lumbarization of S1.  There is L5-S1 spondylolisthesis with what appears to be posterior lateral fusion versus severe facet arthropathy.  There is moderate central stenosis and moderate to severe foraminal stenosis at this level.  Otherwise multilevel degeneration with mild retrolisthesis at each level and mild central and foraminal stenosis    Medical Decision Making:      Jim Malik is a 72 y.o. male status post lumbar decompression and fusion performed over 30 years ago with neck pain and low back issues and radiculopathy.  In terms of the patient's low back, he feels that the symptoms are tolerable.  I explained to him that he does have significant issues including spondylolisthesis and foraminal stenosis in his low back, but he has not interested in pursuing any therapy for this at this time.  In terms of the patient's neck, he has multilevel degenerative changes as well as spondylolisthesis at C2-3 and C3-4.  To further evaluate I recommend flexion-extension x-rays of the cervical spine to rule out dynamic instability.  I will see him back once he completes this.  I explained to him that if there is no evidence of spondylolisthesis, it would be difficult to pinpoint the cause of pain in his neck, and he would unlikely require surgery.      Diagnoses and all orders for this visit:    1. Acquired spondylolisthesis of cervical vertebra  (Primary)    2. Lumbar radiculopathy    3. Lumbar stenosis without neurogenic claudication    4. Lumbar degenerative disc disease    5. DDD (degenerative disc disease), cervical    6. Spondylolisthesis of lumbar region      No follow-ups on file.    This patient was examined wearing appropriate personal protective equipment.                      Dr. Jim Lombardo IV    01/25/23  09:29 EST

## 2023-01-26 NOTE — PROGRESS NOTES
"Subjective   History of Present Illness: Jim Malik is a 72 y.o. male is here today for follow-up for neck pain. In the office today patient reports increased neck pain traveling upward into the base of his skull.  No changes since he was last seen    Chief Complaint   Patient presents with   • Neck Pain     Follow up          Previous Treatment:    The following portions of the patient's history were reviewed and updated as appropriate: allergies, current medications, past family history, past medical history, past social history, past surgical history and problem list.    Review of Systems   Constitutional: Positive for activity change.   HENT: Negative.    Eyes: Negative.    Respiratory: Negative.    Cardiovascular: Negative.    Gastrointestinal: Negative.    Endocrine: Negative.    Genitourinary: Negative.    Musculoskeletal: Positive for myalgias, neck pain and neck stiffness.   Skin: Negative.    Allergic/Immunologic: Negative.    Neurological: Negative.    Hematological: Negative.    Psychiatric/Behavioral: Positive for sleep disturbance.       Objective     /82   Pulse 76   Ht 182.9 cm (72\")   Wt 111 kg (244 lb 9.6 oz)   SpO2 98%   BMI 33.17 kg/m²    Body mass index is 33.17 kg/m².      Neurologic Exam    Assessment & Plan   Independent Review of Radiographic Studies:      I personally reviewed and interpreted the images from the following studies.    Flexion-extension x-ray cervical spine: There is some dynamic instability between flexion and extension at C2-3.  Stable spondylolisthesis that is very mild at C3-4    Medical Decision Making:      Jim Malik is a 72 y.o. male with a history of significant neck pain with degenerative changes throughout his cervical spine with spondylolisthesis and some dynamic instability at C2-3 on flexion-extension x-ray.  To further evaluate this is a source of his pain, I recommend C2-3 facet injections.  I will see him back 2 weeks after he completes the " injections.      Diagnoses and all orders for this visit:    1. Acquired spondylolisthesis of cervical vertebra (Primary)      No follow-ups on file.    This patient was examined wearing appropriate personal protective equipment.                      Dr. Jim Lombardo IV    01/30/23  14:11 EST

## 2023-01-30 ENCOUNTER — OFFICE VISIT (OUTPATIENT)
Dept: NEUROSURGERY | Facility: CLINIC | Age: 73
End: 2023-01-30
Payer: MEDICARE

## 2023-01-30 VITALS
HEART RATE: 76 BPM | SYSTOLIC BLOOD PRESSURE: 137 MMHG | OXYGEN SATURATION: 98 % | DIASTOLIC BLOOD PRESSURE: 82 MMHG | BODY MASS INDEX: 33.13 KG/M2 | HEIGHT: 72 IN | WEIGHT: 244.6 LBS

## 2023-01-30 DIAGNOSIS — M43.12 ACQUIRED SPONDYLOLISTHESIS OF CERVICAL VERTEBRA: Primary | ICD-10-CM

## 2023-01-30 PROCEDURE — 99213 OFFICE O/P EST LOW 20 MIN: CPT | Performed by: NEUROLOGICAL SURGERY
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
## 2023-02-06 ENCOUNTER — OFFICE VISIT (OUTPATIENT)
Dept: PAIN MEDICINE | Facility: CLINIC | Age: 73
End: 2023-02-06
Payer: MEDICARE

## 2023-02-06 VITALS
SYSTOLIC BLOOD PRESSURE: 144 MMHG | DIASTOLIC BLOOD PRESSURE: 87 MMHG | OXYGEN SATURATION: 97 % | HEART RATE: 81 BPM | RESPIRATION RATE: 16 BRPM

## 2023-02-06 DIAGNOSIS — M54.81 BILATERAL OCCIPITAL NEURALGIA: ICD-10-CM

## 2023-02-06 DIAGNOSIS — M50.30 DDD (DEGENERATIVE DISC DISEASE), CERVICAL: ICD-10-CM

## 2023-02-06 DIAGNOSIS — M47.812 CERVICAL SPONDYLOSIS: Primary | ICD-10-CM

## 2023-02-06 PROCEDURE — 99204 OFFICE O/P NEW MOD 45 MIN: CPT | Performed by: STUDENT IN AN ORGANIZED HEALTH CARE EDUCATION/TRAINING PROGRAM

## 2023-02-17 ENCOUNTER — HOSPITAL ENCOUNTER (OUTPATIENT)
Dept: PAIN MEDICINE | Facility: HOSPITAL | Age: 73
Discharge: HOME | End: 2023-02-17
Payer: MEDICARE

## 2023-02-17 VITALS
OXYGEN SATURATION: 96 % | WEIGHT: 235 LBS | TEMPERATURE: 97.1 F | HEART RATE: 75 BPM | BODY MASS INDEX: 31.83 KG/M2 | HEIGHT: 72 IN | DIASTOLIC BLOOD PRESSURE: 87 MMHG | SYSTOLIC BLOOD PRESSURE: 144 MMHG | RESPIRATION RATE: 20 BRPM

## 2023-02-17 DIAGNOSIS — M47.812 CERVICAL SPONDYLOSIS: Primary | ICD-10-CM

## 2023-02-17 DIAGNOSIS — R52 PAIN: ICD-10-CM

## 2023-02-17 DIAGNOSIS — M47.816 LUMBAR SPONDYLOSIS: ICD-10-CM

## 2023-02-17 PROCEDURE — 64490 INJ PARAVERT F JNT C/T 1 LEV: CPT | Performed by: STUDENT IN AN ORGANIZED HEALTH CARE EDUCATION/TRAINING PROGRAM

## 2023-02-17 PROCEDURE — 25010000002 MIDAZOLAM PER 1 MG

## 2023-02-17 PROCEDURE — 25010000002 DEXAMETHASONE SODIUM PHOSPHATE 10 MG/ML SOLUTION: Performed by: STUDENT IN AN ORGANIZED HEALTH CARE EDUCATION/TRAINING PROGRAM

## 2023-02-17 PROCEDURE — 77003 FLUOROGUIDE FOR SPINE INJECT: CPT

## 2023-02-17 PROCEDURE — 64491 INJ PARAVERT F JNT C/T 2 LEV: CPT | Performed by: STUDENT IN AN ORGANIZED HEALTH CARE EDUCATION/TRAINING PROGRAM

## 2023-02-17 PROCEDURE — 25010000002 FENTANYL CITRATE (PF) 50 MCG/ML SOLUTION

## 2023-02-17 RX ORDER — FENTANYL CITRATE 50 UG/ML
INJECTION, SOLUTION INTRAMUSCULAR; INTRAVENOUS
Status: COMPLETED
Start: 2023-02-17 | End: 2023-02-17

## 2023-02-17 RX ORDER — MIDAZOLAM HYDROCHLORIDE 1 MG/ML
2 INJECTION INTRAMUSCULAR; INTRAVENOUS ONCE
Status: COMPLETED | OUTPATIENT
Start: 2023-02-17 | End: 2023-02-17

## 2023-02-17 RX ORDER — BUPIVACAINE HYDROCHLORIDE 2.5 MG/ML
10 INJECTION, SOLUTION EPIDURAL; INFILTRATION; INTRACAUDAL ONCE
Status: DISCONTINUED | OUTPATIENT
Start: 2023-02-17 | End: 2023-02-17

## 2023-02-17 RX ORDER — FENTANYL CITRATE 50 UG/ML
100 INJECTION, SOLUTION INTRAMUSCULAR; INTRAVENOUS ONCE
Status: COMPLETED | OUTPATIENT
Start: 2023-02-17 | End: 2023-02-17

## 2023-02-17 RX ORDER — MIDAZOLAM HYDROCHLORIDE 1 MG/ML
INJECTION INTRAMUSCULAR; INTRAVENOUS
Status: COMPLETED
Start: 2023-02-17 | End: 2023-02-17

## 2023-02-17 RX ORDER — DEXAMETHASONE SODIUM PHOSPHATE 10 MG/ML
10 INJECTION, SOLUTION INTRAMUSCULAR; INTRAVENOUS ONCE
Status: COMPLETED | OUTPATIENT
Start: 2023-02-17 | End: 2023-02-17

## 2023-02-17 RX ORDER — BUPIVACAINE HYDROCHLORIDE 5 MG/ML
10 INJECTION, SOLUTION EPIDURAL; INTRACAUDAL ONCE
Status: COMPLETED | OUTPATIENT
Start: 2023-02-17 | End: 2023-02-17

## 2023-02-17 RX ADMIN — FENTANYL CITRATE 50 MCG: 50 INJECTION, SOLUTION INTRAMUSCULAR; INTRAVENOUS at 14:32

## 2023-02-17 RX ADMIN — BUPIVACAINE HYDROCHLORIDE 4 ML: 5 INJECTION, SOLUTION EPIDURAL; INTRACAUDAL; PERINEURAL at 14:39

## 2023-02-17 RX ADMIN — DEXAMETHASONE SODIUM PHOSPHATE 10 MG: 10 INJECTION, SOLUTION INTRAMUSCULAR; INTRAVENOUS at 14:39

## 2023-02-17 RX ADMIN — MIDAZOLAM HYDROCHLORIDE 1 MG: 1 INJECTION INTRAMUSCULAR; INTRAVENOUS at 14:32

## 2023-02-17 RX ADMIN — MIDAZOLAM 1 MG: 1 INJECTION INTRAMUSCULAR; INTRAVENOUS at 14:32

## 2023-02-17 NOTE — DISCHARGE INSTRUCTIONS
Medial Branch Nerve Block    Medial branch nerve block is a procedure to numb the nerves that supply the joints between your spinal bones (facet joints). The facet joints are located on the back of your spine. You may have the procedure on your neck or your upper, middle, or lower spine.  During this procedure, your health care provider will inject a numbing medicine (local anesthetic) around the medial nerves near the facet joint being treated. If more than one facet joint is causing pain, you may have more than one injection. In most cases, an anti-inflammatory medicine (steroid) will also be injected. You may need this procedure if:  You have back pain from wear and tear (osteoarthritis) of your facet joint.  You have an injury to a facet joint.  Your health care provider wants to diagnose a facet joint as the cause of your pain. If the procedure relieves the pain, this indicates that the facet joint was the cause.  The local anesthetic will relieve pain for several days. The steroid may continue to relieve pain for several weeks. If your pain returns when the medicines wear off, this procedure may be repeated.  Tell a health care provider about:  Any allergies you have.  All medicines you are taking, including vitamins, herbs, eye drops, creams, and over-the-counter medicines.  Any problems you or family members have had with anesthetic medicines.  Any blood disorders you have.  Any surgeries you have had.  Any medical conditions you have.  Whether you are pregnant or may be pregnant.  What are the risks?  Generally, this is a safe procedure. However, problems may occur, including:  Infection.  Bleeding.  Allergic reactions to medicines or dyes.  Damage to other structures or organs.  Injection of the anesthetic into a blood vessel, which may decrease blood supply to your spinal cord and cause damage.  Spread of the anesthetic to nearby nerves, which may cause temporary weakness or numbness.  What happens  before the procedure?  Ask your health care provider about:  Changing or stopping your regular medicines. This is especially important if you are taking diabetes medicines or blood thinners.  Taking over-the-counter medicines, vitamins, herbs, and supplements.  Plan to have a responsible adult take you home from the hospital or clinic if required  Ask your health care provider what steps will be taken to help prevent infection. These may include:  Washing skin with a germ-killing soap.  What happens during the procedure?  You will be given one or more of the following:  A medicine to numb the area (local anesthetic). Your health care provider will feel for the facet joint or joints that are causing pain and inject a short-acting local anesthetic into the skin over the joint or joints.  Your health care provider will then pass a needle into the area around the facet joint.  Your health care provider may use a type of X-ray (fluoroscopy) to look at images of your spinal cord. If so, the health care provider will inject a small amount of dye into the facet joint area. The dye will show up on fluoroscopy and help locate the exact area to inject the long-acting anesthetic.  The medicine will then be injected. Along with the long-acting anesthetic, a steroid medicine may also be injected.  The needle will be removed, and a bandage/band-aid will be placed over the injection site.  The procedure may vary among health care providers.  What can I expect after the procedure?  Your blood pressure, heart rate, breathing rate, and blood oxygen level will be monitored until you leave the hospital or clinic.  You should feel less pain in your back.  You may have some soreness around the injection site.  Follow these instructions at home:  Injection site care  Leave your bandage/band-aid on for 24 hours.  Do not take baths, swim, or use a hot tub for 24 hours.  Check your injection site every day for signs of infection. Check  for:  Redness, swelling, or pain.  Fluid or blood.  Warmth.  Pus or a bad smell.  If you need something for comfort at the site, put ice on the injection area:  Put ice in a plastic bag.  Place a towel between your skin and the bag.  Leave the ice on for 20 minutes, 2-3 times a day.       5.  No heat to injection are for 24-48 hours.  General instructions  Continue your normal pain medications after the procedure.  No heavy lifting, pushing or pulling after the procedure for 24 hours then return to your normal activities.  Keep a log of your pain after the procedure. Keep track of how much pain you have and when you have it. This will help your health care provider plan your future treatment.  You should have relief of pain from the anesthetic for up to 2-3 days.  After that you may notice some pain again until the steroid starts to help. Pain relief from the steroid may last for a few weeks.       4.  Keep all follow-up visits as told by your health care provider. This is important.  Contact your health care provider if:  Your pain is not relieved or gets worse at home.  You have a fever or chills.  You have any signs of infection.  You develop any numbness or weakness.  Summary  Medial branch nerve block is a procedure to numb the nerves that supply the joints between your spinal bones (facet joint).  You may have the procedure on your neck or your upper, middle, or lower spine.  This procedure may be done both to diagnose and relieve facet joint pain.  A long-acting local anesthetic is injected close to the nerve that supplies the facet joint. An anti-inflammatory medicine (steroid) may also be injected.  This information is not intended to replace advice given to you by your health care provider. Make sure you discuss any questions you have with your health care provider.  Document Revised: 04/16/2021 Document Reviewed: 08/22/2019  Elsevier Patient Education © 2021 [employer]              Moderate Conscious  Sedation, Adult, Care After    This sheet gives you information about how to care for yourself after your procedure. Your health care provider may also give you more specific instructions. If you have problems or questions, contact your health care provider.    What can I expect after the procedure?  After the procedure, it is common to have:  Sleepiness for several hours.  Impaired judgment for several hours.  Difficulty with balance.  Vomiting if you eat too soon.    Follow these instructions at home:  For the next 24 hours:         Rest.  Do not participate in activities where you could fall or become injured.  Do not drive or use machinery.  Do not drink alcohol.  Do not take sleeping pills or medicines that cause drowsiness.  Do not make important decisions or sign legal documents.  Do not take care of children on your own.    Eating and drinking    Follow the diet recommended by your health care provider.  Drink enough fluid to keep your urine pale yellow.  If you vomit:  Drink water, juice, or soup when you can drink without vomiting.  Make sure you have little or no nausea before eating solid foods.     General instructions  Take over-the-counter and prescription medicines only as told by your health care provider.  Have a responsible adult stay with you for 24 hours. It is important to have someone help care for you until you are awake and alert.  Do not smoke.  Keep all follow-up visits as told by your health care provider. This is important.    Contact a health care provider if:  You are still sleepy or having trouble with balance after 24 hours.  You feel light-headed.  You keep feeling nauseous or you keep vomiting.  You develop a rash.  You have a fever.  You have redness or swelling around the IV site.    Get help right away if:  You have trouble breathing.  You have new-onset confusion at home.    Summary  After the procedure, it is common to feel sleepy, have impaired judgment, or feel nauseous if  you eat too soon.  Rest after you get home. Know the things you should not do after the procedure.  Follow the diet recommended by your health care provider and drink enough fluid to keep your urine pale yellow.  Get help right away if you have trouble breathing or new-onset confusion at home.    This information is not intended to replace advice given to you by your health care provider. Make sure you discuss any questions you have with your health care provider.    Document Revised: 04/16/2021 Document Reviewed: 11/12/2020  Elsevier Patient Education © 2021 Else[employer] Inc[employer]

## 2023-02-17 NOTE — H&P
H and P reviewed from previous visit and no changes to patient's clinical presentation. Will proceed with procedure as planned. Patient denies history of DM and being on blood thinners.      ASA-3  MP-2    Tyler Burris DO  Pain Management   [facility]

## 2023-02-17 NOTE — PROCEDURES
PRE OPERATIVE DIAGNOSIS:    1.  Cervical DDD  2.  Cervical Spondylosis     POST OPERATIVE DIAGNOSIS:  Same.     PROCEDURE: B/l C 2, 3 Medial Branch Block.     PROCEDURE DETAILS:   After obtaining written informed consent patient was taken to the procedure room. Pre-procedure blood pressure and pulse were stable and recorded in patients clinic chart.   The patient was placed in a lateral position and the neck was prepped with chloraprep and draped in the usual sterile fashion.  The skin was infiltrated with 1% lidocaine at the junction of transverse process with the vertebral body at the left C2 level. A 25-gauge, 3.5-inch needle was inserted into the cervical medial branch under radiographic guidance.  Following placement of the needle and negative aspiration, 1 cc of .25% bupivacaine with dexamethasone. The identical procedure was performed on the left C3 medial branch and right C2, 3 MB. A total of 4 cc of 0.25% bupivacaine with 10 mg of dexamethasone was injected.  During the procedure there was no evidence of CSF, paresthesias or heme.      After the procedure the needles were removed. Skin was cleaned and a sterile dressing was applied.     Following the procedure the patient's vital signs were stable. The patient was discharged home in good condition after being given discharge instructions.     COMPLICATIONS None    Moderate sedation: 50 mcg Fentanly; 1 mg Versed ; 15 minutes of moderate sedation was done and vitals were monitored closely during this periods.  ETCO2, RR, BP, HR, O2 were monitored closely throughout sedation period.        Tyler Burris DO  Pain Management   [facility]

## 2023-03-02 ENCOUNTER — OFFICE VISIT (OUTPATIENT)
Dept: PAIN MEDICINE | Facility: CLINIC | Age: 73
End: 2023-03-02
Payer: MEDICARE

## 2023-03-02 VITALS
HEART RATE: 78 BPM | RESPIRATION RATE: 16 BRPM | SYSTOLIC BLOOD PRESSURE: 145 MMHG | DIASTOLIC BLOOD PRESSURE: 82 MMHG | OXYGEN SATURATION: 97 %

## 2023-03-02 DIAGNOSIS — M47.812 CERVICAL SPONDYLOSIS: Primary | ICD-10-CM

## 2023-03-02 DIAGNOSIS — M54.81 BILATERAL OCCIPITAL NEURALGIA: ICD-10-CM

## 2023-03-02 DIAGNOSIS — M50.30 DDD (DEGENERATIVE DISC DISEASE), CERVICAL: ICD-10-CM

## 2023-03-02 PROCEDURE — 99213 OFFICE O/P EST LOW 20 MIN: CPT | Performed by: STUDENT IN AN ORGANIZED HEALTH CARE EDUCATION/TRAINING PROGRAM

## 2023-03-09 ENCOUNTER — HOSPITAL ENCOUNTER (OUTPATIENT)
Dept: PAIN MEDICINE | Facility: HOSPITAL | Age: 73
Discharge: HOME | End: 2023-03-09
Payer: MEDICARE

## 2023-03-09 VITALS
HEART RATE: 76 BPM | SYSTOLIC BLOOD PRESSURE: 107 MMHG | DIASTOLIC BLOOD PRESSURE: 73 MMHG | WEIGHT: 235 LBS | OXYGEN SATURATION: 95 % | TEMPERATURE: 97.3 F | BODY MASS INDEX: 31.83 KG/M2 | HEIGHT: 72 IN | RESPIRATION RATE: 13 BRPM

## 2023-03-09 DIAGNOSIS — R52 PAIN: ICD-10-CM

## 2023-03-09 DIAGNOSIS — M47.812 CERVICAL SPONDYLOSIS: Primary | ICD-10-CM

## 2023-03-09 PROCEDURE — 64490 INJ PARAVERT F JNT C/T 1 LEV: CPT | Performed by: STUDENT IN AN ORGANIZED HEALTH CARE EDUCATION/TRAINING PROGRAM

## 2023-03-09 PROCEDURE — 25010000002 FENTANYL CITRATE (PF) 50 MCG/ML SOLUTION: Performed by: STUDENT IN AN ORGANIZED HEALTH CARE EDUCATION/TRAINING PROGRAM

## 2023-03-09 PROCEDURE — 25010000002 ROPIVACAINE PER 1 MG: Performed by: STUDENT IN AN ORGANIZED HEALTH CARE EDUCATION/TRAINING PROGRAM

## 2023-03-09 PROCEDURE — 25010000002 MIDAZOLAM PER 1 MG: Performed by: STUDENT IN AN ORGANIZED HEALTH CARE EDUCATION/TRAINING PROGRAM

## 2023-03-09 PROCEDURE — 77003 FLUOROGUIDE FOR SPINE INJECT: CPT

## 2023-03-09 PROCEDURE — 64491 INJ PARAVERT F JNT C/T 2 LEV: CPT | Performed by: STUDENT IN AN ORGANIZED HEALTH CARE EDUCATION/TRAINING PROGRAM

## 2023-03-09 RX ORDER — FENTANYL CITRATE 50 UG/ML
100 INJECTION, SOLUTION INTRAMUSCULAR; INTRAVENOUS ONCE
Status: COMPLETED | OUTPATIENT
Start: 2023-03-09 | End: 2023-03-09

## 2023-03-09 RX ORDER — ROPIVACAINE HYDROCHLORIDE 2 MG/ML
6 INJECTION, SOLUTION EPIDURAL; INFILTRATION; PERINEURAL ONCE
Status: COMPLETED | OUTPATIENT
Start: 2023-03-09 | End: 2023-03-09

## 2023-03-09 RX ORDER — LIDOCAINE HYDROCHLORIDE 10 MG/ML
5 INJECTION, SOLUTION EPIDURAL; INFILTRATION; INTRACAUDAL; PERINEURAL ONCE
Status: COMPLETED | OUTPATIENT
Start: 2023-03-09 | End: 2023-03-09

## 2023-03-09 RX ORDER — MIDAZOLAM HYDROCHLORIDE 1 MG/ML
2 INJECTION INTRAMUSCULAR; INTRAVENOUS ONCE
Status: COMPLETED | OUTPATIENT
Start: 2023-03-09 | End: 2023-03-09

## 2023-03-09 RX ADMIN — MIDAZOLAM 1 MG: 1 INJECTION INTRAMUSCULAR; INTRAVENOUS at 11:00

## 2023-03-09 RX ADMIN — FENTANYL CITRATE 50 MCG: 50 INJECTION, SOLUTION INTRAMUSCULAR; INTRAVENOUS at 11:00

## 2023-03-09 RX ADMIN — ROPIVACAINE HYDROCHLORIDE 12 MG: 2 INJECTION, SOLUTION EPIDURAL; INFILTRATION; PERINEURAL at 11:06

## 2023-03-09 RX ADMIN — LIDOCAINE HYDROCHLORIDE 5 ML: 10 INJECTION, SOLUTION EPIDURAL; INFILTRATION; INTRACAUDAL; PERINEURAL at 11:06

## 2023-03-09 NOTE — PROGRESS NOTES
Subjective   History of Present Illness: Jim Malik is a 73 y.o. male is here today for follow-up for neck pain. In the office today patient reports some temporary relief with his neck pain since receiving his injections.  Patient says that after his injections he has had greater than 70% relief of his symptoms.  He is scheduled for ablation.    Chief Complaint   Patient presents with   • Neck Pain     Follow up           Previous Treatment:  Medial branch block 2/17/23 and 3/9/23.    The following portions of the patient's history were reviewed and updated as appropriate: allergies, current medications, past family history, past medical history, past social history, past surgical history and problem list.    Review of Systems   Constitutional: Positive for activity change.   HENT: Negative.    Eyes: Negative.    Respiratory: Negative.    Cardiovascular: Negative.    Gastrointestinal: Negative.    Endocrine: Negative.    Genitourinary: Negative.    Musculoskeletal: Positive for arthralgias, myalgias, neck pain and neck stiffness.   Skin: Negative.    Allergic/Immunologic: Negative.    Neurological: Negative for numbness.   Psychiatric/Behavioral: Positive for sleep disturbance.       Objective     /87   Pulse 73   Wt 113 kg (249 lb 9.6 oz)   SpO2 98%   BMI 33.85 kg/m²    Body mass index is 33.85 kg/m².      Neurologic Exam    Assessment & Plan   Independent Review of Radiographic Studies:      I personally reviewed and interpreted the images from the following studies.    No new imaging    Medical Decision Making:      Jim Malik is a 73 y.o. male with spondylolisthesis from C2-4 with dynamic instability at C2-3.  Patient underwent facet injections at C2-3 and got significant relief.  He is scheduled to undergo ablations.  I agree with this plan.  He can follow-up with me should the ablations and injections stop providing him with relief.  At that point we could consider surgical intervention either  C2-4 ACDF or posterior fusion.      Diagnoses and all orders for this visit:    1. Acquired spondylolisthesis of cervical vertebra (Primary)      No follow-ups on file.    This patient was examined wearing appropriate personal protective equipment.                      Dr. Jim Lombardo IV    03/13/23  10:13 EDT

## 2023-03-09 NOTE — PROCEDURES
PRE OPERATIVE DIAGNOSIS:    1.  Cervical DDD  2.  Cervical Spondylosis     POST OPERATIVE DIAGNOSIS:  Same.     PROCEDURE: B/l C 2, 3 Medial Branch Block LOCAL ONLY diagnostic block      PROCEDURE DETAILS:   After obtaining written informed consent patient was taken to the procedure room. Pre-procedure blood pressure and pulse were stable and recorded in patients clinic chart.   The patient was placed in a lateral position and the neck was prepped with chloraprep and draped in the usual sterile fashion.  The skin was infiltrated with 1% lidocaine at the junction of transverse process with the vertebral body at the left C2 level. A 25-gauge, 3.5-inch needle was inserted into the cervical medial branch under radiographic guidance.  Following placement of the needle and negative aspiration, 1 cc of .25% bupivacaine with dexamethasone. The identical procedure was performed on the left C3 medial branch and right C2, 3 MB. A total of 4 cc of 0.25% ropivacaine was injected.  During the procedure there was no evidence of CSF, paresthesias or heme.      After the procedure the needles were removed. Skin was cleaned and a sterile dressing was applied.     Following the procedure the patient's vital signs were stable. The patient was discharged home in good condition after being given discharge instructions.     COMPLICATIONS None    80% pain relief post op procedure.      Tyler Burris DO  Pain Management   [facility]

## 2023-03-09 NOTE — H&P
H and P reviewed from previous visit and no changes to patient's clinical presentation. Will proceed with procedure as planned. Patient denies history of DM and being on blood thinners.    Tyler Burris DO  Pain Management   [facility]

## 2023-03-10 ENCOUNTER — TELEPHONE (OUTPATIENT)
Dept: PAIN MEDICINE | Facility: HOSPITAL | Age: 73
End: 2023-03-10
Payer: MEDICARE

## 2023-03-10 ENCOUNTER — TELEPHONE (OUTPATIENT)
Dept: PAIN MEDICINE | Facility: CLINIC | Age: 73
End: 2023-03-10
Payer: MEDICARE

## 2023-03-10 DIAGNOSIS — M47.812 CERVICAL SPONDYLOSIS: Primary | ICD-10-CM

## 2023-03-10 NOTE — TELEPHONE ENCOUNTER
Caller: Jim Malik    Relationship: Self    Best call back number:     Who are you requesting to speak with (clinical staff, provider,  specific staff member): CASEY    Do you know the name of the person who called: CASEY    What was the call regarding: FEEDBACK ON YESTERDAY'S PROCEDURE    Do you require a callback: YES

## 2023-03-10 NOTE — TELEPHONE ENCOUNTER
Personally called patient and discussed results with him.  Patient is s/p bilateral C2-3 MBB with diagnostic block local only.  Patient states that he had about 80% relief lasting for about 8 hours with range of motion improved as well as resolution of headaches.  Pain returned around 6 PM and back to normal which is expected.  - This is a second diagnostic block.  Patient had 2 diagnostic blocks with > 80% relief with 2 diagnostic block and thus good candidate for RFA bilateral C2-3 under sedation.  Discussed benefits and risk with patient.  We will get him scheduled.    Tyler Burris DO  Pain Management   [facility]

## 2023-03-10 NOTE — TELEPHONE ENCOUNTER
Attempted post procedure phone call but no answer.  Message left on answering machine to call us with any questions or concerns.    Asked patient to return call as Dr. Burris has a couple questions for patient. When did relief from procedure wear off ? What % is patient at now with his pain relief?

## 2023-03-13 ENCOUNTER — OFFICE VISIT (OUTPATIENT)
Dept: NEUROSURGERY | Facility: CLINIC | Age: 73
End: 2023-03-13
Payer: MEDICARE

## 2023-03-13 VITALS
BODY MASS INDEX: 33.85 KG/M2 | SYSTOLIC BLOOD PRESSURE: 146 MMHG | WEIGHT: 249.6 LBS | OXYGEN SATURATION: 98 % | HEART RATE: 73 BPM | DIASTOLIC BLOOD PRESSURE: 87 MMHG

## 2023-03-13 DIAGNOSIS — M43.12 ACQUIRED SPONDYLOLISTHESIS OF CERVICAL VERTEBRA: Primary | ICD-10-CM

## 2023-03-13 PROCEDURE — 99213 OFFICE O/P EST LOW 20 MIN: CPT | Performed by: NEUROLOGICAL SURGERY

## 2023-04-04 ENCOUNTER — HOSPITAL ENCOUNTER (OUTPATIENT)
Dept: PAIN MEDICINE | Facility: HOSPITAL | Age: 73
Discharge: HOME | End: 2023-04-04
Payer: MEDICARE

## 2023-04-04 VITALS
HEART RATE: 72 BPM | HEIGHT: 72 IN | SYSTOLIC BLOOD PRESSURE: 132 MMHG | WEIGHT: 249 LBS | OXYGEN SATURATION: 96 % | DIASTOLIC BLOOD PRESSURE: 81 MMHG | TEMPERATURE: 98.1 F | BODY MASS INDEX: 33.72 KG/M2 | RESPIRATION RATE: 10 BRPM

## 2023-04-04 DIAGNOSIS — M47.812 CERVICAL SPONDYLOSIS: ICD-10-CM

## 2023-04-04 DIAGNOSIS — R52 PAIN: ICD-10-CM

## 2023-04-04 PROCEDURE — 25010000002 FENTANYL CITRATE (PF) 50 MCG/ML SOLUTION: Performed by: STUDENT IN AN ORGANIZED HEALTH CARE EDUCATION/TRAINING PROGRAM

## 2023-04-04 PROCEDURE — 77003 FLUOROGUIDE FOR SPINE INJECT: CPT

## 2023-04-04 PROCEDURE — 99152 MOD SED SAME PHYS/QHP 5/>YRS: CPT | Performed by: STUDENT IN AN ORGANIZED HEALTH CARE EDUCATION/TRAINING PROGRAM

## 2023-04-04 PROCEDURE — 25010000002 MIDAZOLAM PER 1 MG: Performed by: STUDENT IN AN ORGANIZED HEALTH CARE EDUCATION/TRAINING PROGRAM

## 2023-04-04 PROCEDURE — 64633 DESTROY CERV/THOR FACET JNT: CPT | Performed by: STUDENT IN AN ORGANIZED HEALTH CARE EDUCATION/TRAINING PROGRAM

## 2023-04-04 PROCEDURE — 64634 DESTROY C/TH FACET JNT ADDL: CPT | Performed by: STUDENT IN AN ORGANIZED HEALTH CARE EDUCATION/TRAINING PROGRAM

## 2023-04-04 RX ORDER — FENTANYL CITRATE 50 UG/ML
100 INJECTION, SOLUTION INTRAMUSCULAR; INTRAVENOUS ONCE
Status: COMPLETED | OUTPATIENT
Start: 2023-04-04 | End: 2023-04-04

## 2023-04-04 RX ORDER — LIDOCAINE HYDROCHLORIDE 20 MG/ML
10 INJECTION, SOLUTION INFILTRATION; PERINEURAL ONCE
Status: COMPLETED | OUTPATIENT
Start: 2023-04-04 | End: 2023-04-04

## 2023-04-04 RX ORDER — MIDAZOLAM HYDROCHLORIDE 1 MG/ML
2 INJECTION INTRAMUSCULAR; INTRAVENOUS ONCE
Status: COMPLETED | OUTPATIENT
Start: 2023-04-04 | End: 2023-04-04

## 2023-04-04 RX ORDER — LIDOCAINE HYDROCHLORIDE 10 MG/ML
5 INJECTION, SOLUTION EPIDURAL; INFILTRATION; INTRACAUDAL; PERINEURAL ONCE
Status: COMPLETED | OUTPATIENT
Start: 2023-04-04 | End: 2023-04-04

## 2023-04-04 RX ADMIN — MIDAZOLAM 1 MG: 1 INJECTION INTRAMUSCULAR; INTRAVENOUS at 13:05

## 2023-04-04 RX ADMIN — FENTANYL CITRATE 75 MCG: 50 INJECTION, SOLUTION INTRAMUSCULAR; INTRAVENOUS at 13:02

## 2023-04-04 RX ADMIN — LIDOCAINE HYDROCHLORIDE 5 ML: 10 INJECTION, SOLUTION EPIDURAL; INFILTRATION; INTRACAUDAL; PERINEURAL at 13:11

## 2023-04-04 RX ADMIN — LIDOCAINE HYDROCHLORIDE 5 ML: 20 INJECTION, SOLUTION INFILTRATION; PERINEURAL at 13:11

## 2023-04-04 RX ADMIN — MIDAZOLAM 1 MG: 1 INJECTION INTRAMUSCULAR; INTRAVENOUS at 13:13

## 2023-04-04 NOTE — H&P
H and P reviewed from previous visit and no changes to patient's clinical presentation. Will proceed with procedure as planned. Patient denies history of DM and being on blood thinners.    ASA-3  MP-2    Tyler Burris DO  Pain Management   [facility]

## 2023-04-04 NOTE — DISCHARGE INSTRUCTIONS
Radiofrequency Lesioning, Care After    Refer to this sheet in the next few weeks. These instructions provide you with information about caring for yourself after your procedure. Your health care provider may also give you more specific instructions. Your treatment has been planned according to current medical practices, but problems sometimes occur. Call your health care provider if you have any problems or questions after your procedure.  What can I expect after the procedure?  After the procedure, it is common to have:  Pain from the burned nerve.  You may feel a burning sensation for up to 1-2 weeks after the procedure  Temporary numbness at the site  Your leg/legs may be weak or feel numb after the procedure until the numbing medication wears off.  When you are up and walking, have assistance to prevent falling.     Follow these instructions at home:  Take over-the-counter and prescription medicines only as told by your health care provider.  Return to your normal activities as told by your health care provider. Ask your health care provider what activities are safe for you.  Pay close attention to how you feel after the procedure. If you start to have pain, write down when it hurts and how it feels. This will help you and your health care provider to know if you need an additional treatment.  Check your needle insertion site every day for signs of infection. Watch for:  Redness, swelling, or pain.  Fluid, blood, or pus.  Keep all follow-up visits as told by your health care provider. This is important.  No driving for 24 hrs-make sure you have full sensation in your legs prior to driving.  Avoid using heat on the injection site for 24 hours. You may use ice intermittently if needed by placing a               towel between your skin and the ice bag and using the ice for 20 minutes 2-3 times a day.  Do not take baths, swim or use a hot tub for 24 hours.  Leave your band-aids on for 24 hours and keep them  dry.    Contact a health care provider if:  Your pain does not get better.  You have redness, swelling, or pain at the needle insertion site.  You have fluid, blood, or pus coming from the needle insertion site.  You have a fever over 101 degrees.  You have new numb.ness in your arm or leg after the procedure    Get help right away if:  You develop sudden, severe pain.  You develop numbness or tingling near the procedure site that does not go away.  This information is not intended to replace advice given to you by your health care provider. Make sure you discuss any questions you have with your health care provider.  Document Released: 08/16/2012 Document Revised: 05/25/2017 Document Reviewed: 01/25/2016  [employer] Interactive Patient Education © 2019 [employer]    Moderate Conscious Sedation, Adult, Care After    This sheet gives you information about how to care for yourself after your procedure. Your health care provider may also give you more specific instructions. If you have problems or questions, contact your health care provider.    What can I expect after the procedure?  After the procedure, it is common to have:  Sleepiness for several hours.  Impaired judgment for several hours.  Difficulty with balance.  Vomiting if you eat too soon.    Follow these instructions at home:  For the next 24 hours:         Rest.  Do not participate in activities where you could fall or become injured.  Do not drive or use machinery.  Do not drink alcohol.  Do not take sleeping pills or medicines that cause drowsiness.  Do not make important decisions or sign legal documents.  Do not take care of children on your own.    Eating and drinking    Follow the diet recommended by your health care provider.  Drink enough fluid to keep your urine pale yellow.  If you vomit:  Drink water, juice, or soup when you can drink without vomiting.  Make sure you have little or no nausea before eating solid foods.     General  instructions  Take over-the-counter and prescription medicines only as told by your health care provider.  Have a responsible adult stay with you for 24 hours. It is important to have someone help care for you until you are awake and alert.  Do not smoke.  Keep all follow-up visits as told by your health care provider. This is important.    Contact a health care provider if:  You are still sleepy or having trouble with balance after 24 hours.  You feel light-headed.  You keep feeling nauseous or you keep vomiting.  You develop a rash.  You have a fever.  You have redness or swelling around the IV site.    Get help right away if:  You have trouble breathing.  You have new-onset confusion at home.    Summary  After the procedure, it is common to feel sleepy, have impaired judgment, or feel nauseous if you eat too soon.  Rest after you get home. Know the things you should not do after the procedure.  Follow the diet recommended by your health care provider and drink enough fluid to keep your urine pale yellow.  Get help right away if you have trouble breathing or new-onset confusion at home.    This information is not intended to replace advice given to you by your health care provider. Make sure you discuss any questions you have with your health care provider.    Document Revised: 04/16/2021 Document Reviewed: 11/12/2020  Elsevier Patient Education © 2021 [employer]

## 2023-04-04 NOTE — PROCEDURES
Preoperative Diagnosis:    Cervical Spondylosis                                              Postoperative Diagnosis: SAME    PROCEDURE:  Radiofrequency Ablation (RFA) of Medial Branch at Right C 2, 3    NOTE:  After obtaining written informed consent patient was taken to the procedure room. Pre-procedure blood pressure and pulse were stable and recorded in patients clinic chart.     Patient was placed in lateral position with right side cervical area up.  Area was prepped with ChloraPrep x2 and draped in usual sterile fashion.  The skin over right C2 transverse process with vertebral body was infiltrated with 1% lidocaine for local anesthesia.  20-gauge RF needle 50 mm with 5 mm active tip was inserted close to right C3 medial branch under radiographic guidance.  Both AP and lateral views were obtained.    Another needle was placed at the facet line between C2 and C3 with the following same protocol above.    Following placement of the needle sensory stimulation at 50 Hz and motor stimulation at 2 Hz was carried out. 1 cc of 2% lidocaine was injected. RFA was carried out in lesion mode after patient reporting reproduction of pain or sensation in the area of symptoms and denying extremity motor sensations. The settings were 80°C, and 90 seconds.  Needle tip was oriented 180 degrees and repeat RFA was performed at same settings for 60 seconds.  During the procedure no pain was reported in the extremities by the patient.     After the procedure the needles were removed. Skin was cleaned and a sterile dressing was applied. Following the procedure the patient's vital signs were stable. The patient was discharged.     COMPLICATIONS: None    Moderate sedation: 75 mcg Fentanly; 2 mg Versed ; 15 minutes of moderate sedation was done and vitals were monitored closely during this periods.  ETCO2, RR, BP, HR, O2 were monitored closely throughout sedation period.     Tyler Burris DO  Pain Management   [facility]

## 2023-04-05 ENCOUNTER — OFFICE VISIT (OUTPATIENT)
Dept: FAMILY MEDICINE CLINIC | Facility: CLINIC | Age: 73
End: 2023-04-05
Payer: MEDICARE

## 2023-04-05 ENCOUNTER — TELEPHONE (OUTPATIENT)
Dept: PAIN MEDICINE | Facility: HOSPITAL | Age: 73
End: 2023-04-05
Payer: MEDICARE

## 2023-04-05 VITALS
OXYGEN SATURATION: 95 % | WEIGHT: 245 LBS | HEIGHT: 72 IN | DIASTOLIC BLOOD PRESSURE: 88 MMHG | HEART RATE: 82 BPM | RESPIRATION RATE: 16 BRPM | SYSTOLIC BLOOD PRESSURE: 139 MMHG | TEMPERATURE: 98.3 F | BODY MASS INDEX: 33.18 KG/M2

## 2023-04-05 DIAGNOSIS — Z00.00 ENCOUNTER FOR ANNUAL WELLNESS EXAM IN MEDICARE PATIENT: Primary | ICD-10-CM

## 2023-04-05 PROCEDURE — 1159F MED LIST DOCD IN RCRD: CPT | Performed by: FAMILY MEDICINE

## 2023-04-05 PROCEDURE — 1160F RVW MEDS BY RX/DR IN RCRD: CPT | Performed by: FAMILY MEDICINE

## 2023-04-05 PROCEDURE — G0439 PPPS, SUBSEQ VISIT: HCPCS | Performed by: FAMILY MEDICINE

## 2023-04-05 PROCEDURE — 1170F FXNL STATUS ASSESSED: CPT | Performed by: FAMILY MEDICINE

## 2023-04-05 RX ORDER — ALBUTEROL SULFATE 90 UG/1
2 AEROSOL, METERED RESPIRATORY (INHALATION) EVERY 4 HOURS PRN
Qty: 8 G | Refills: 5 | Status: SHIPPED | OUTPATIENT
Start: 2023-04-05

## 2023-04-05 NOTE — PROGRESS NOTES
The ABCs of the Annual Wellness Visit  Subsequent Medicare Wellness Visit    Subjective    Jim Malik is a 73 y.o. male who presents for a Subsequent Medicare Wellness Visit.    The following portions of the patient's history were reviewed and   updated as appropriate: allergies, current medications, past family history, past medical history, past social history, past surgical history and problem list.    Compared to one year ago, the patient feels his physical   health is the same.    Compared to one year ago, the patient feels his mental   health is the same.    Recent Hospitalizations:  He was not admitted to the hospital during the last year.       Current Medical Providers:  Patient Care Team:  Naeem Mehta Jr., MD as PCP - General (Family Medicine)    Outpatient Medications Prior to Visit   Medication Sig Dispense Refill   • Aspirin Low Dose 81 MG EC tablet TAKE 1 TABLET BY MOUTH EVERY DAY 30 tablet 5   • budesonide-formoterol (Symbicort) 160-4.5 MCG/ACT inhaler Inhale 2 puffs 2 (Two) Times a Day. 90 each 3   • montelukast (SINGULAIR) 10 MG tablet TAKE 1 TABLET BY MOUTH EVERY DAY 30 tablet 26   • naproxen (NAPROSYN) 500 MG tablet TAKE 1 TABLET BY MOUTH TWICE A DAY WITH MEALS 60 tablet 5   • rosuvastatin (CRESTOR) 5 MG tablet TAKE 1 TABLET BY MOUTH EVERY DAY 30 tablet 5   • sildenafil (VIAGRA) 100 MG tablet TAKE 1/2 - 1 TABLET BY MOUTH 1 HOUR PRIOR TO INTERCOURSE 12 tablet 7   • albuterol sulfate HFA (Proventil HFA) 108 (90 Base) MCG/ACT inhaler Inhale 2 puffs Every 4 (Four) Hours As Needed for Wheezing. 8 g 5     No facility-administered medications prior to visit.       No opioid medication identified on active medication list. I have reviewed chart for other potential  high risk medication/s and harmful drug interactions in the elderly.          Aspirin is on active medication list. Aspirin use is indicated based on review of current medical condition/s. Pros and cons of this therapy have been  "discussed today. Benefits of this medication outweigh potential harm.  Patient has been encouraged to continue taking this medication.  .      Patient Active Problem List   Diagnosis   • Multinodular goiter   • Hyperlipidemia   • Asthma   • Allergic rhinitis   • Basal cell carcinoma, scalp/neck   • Primary osteoarthritis involving multiple joints   • IPMN (intraductal papillary mucinous neoplasm)     Advance Care Planning   Advance Care Planning     Advance Directive is not on file.  ACP discussion was held with the patient during this visit. Patient does not have an advance directive, information provided.     Objective    Vitals:    04/05/23 1424   BP: 139/88   BP Location: Right arm   Patient Position: Sitting   Cuff Size: Adult   Pulse: 82   Resp: 16   Temp: 98.3 °F (36.8 °C)   TempSrc: Oral   SpO2: 95%   Weight: 111 kg (245 lb)   Height: 182.9 cm (72\")   PainSc: 0-No pain     Estimated body mass index is 33.23 kg/m² as calculated from the following:    Height as of this encounter: 182.9 cm (72\").    Weight as of this encounter: 111 kg (245 lb).    BMI is >= 30 and <35. (Class 1 Obesity). The following options were offered after discussion;: exercise counseling/recommendations      Does the patient have evidence of cognitive impairment? No          HEALTH RISK ASSESSMENT    Smoking Status:  Social History     Tobacco Use   Smoking Status Never   Smokeless Tobacco Never     Alcohol Consumption:  Social History     Substance and Sexual Activity   Alcohol Use Yes   • Alcohol/week: 1.0 standard drink   • Types: 1 Cans of beer per week    Comment: Occ.     Fall Risk Screen:    STEADI Fall Risk Assessment was completed, and patient is at LOW risk for falls.Assessment completed on:4/5/2023    Depression Screening:  PHQ-2/PHQ-9 Depression Screening 4/5/2023   Little Interest or Pleasure in Doing Things 0-->not at all   Feeling Down, Depressed or Hopeless 0-->not at all   Trouble Falling or Staying Asleep, or Sleeping " Too Much -   Feeling Tired or Having Little Energy -   Poor Appetite or Overeating -   Feeling Bad about Yourself - or that You are a Failure or Have Let Yourself or Your Family Down -   Trouble Concentrating on Things, Such as Reading the Newspaper or Watching Television -   Moving or Speaking So Slowly that Other People Could Have Noticed? Or the Opposite - Being So Fidgety -   Thoughts that You Would be Better Off Dead or of Hurting Yourself in Some Way -   PHQ-9: Brief Depression Severity Measure Score 0   If You Checked Off Any Problems, How Difficult Have These Problems Made It For You to Do Your Work, Take Care of Things at Home, or Get Along with Other People? -       Health Habits and Functional and Cognitive Screening:  Functional & Cognitive Status 4/5/2023   Do you have difficulty preparing food and eating? No   Do you have difficulty bathing yourself, getting dressed or grooming yourself? No   Do you have difficulty using the toilet? No   Do you have difficulty moving around from place to place? No   Do you have trouble with steps or getting out of a bed or a chair? No   Current Diet Well Balanced Diet   Dental Exam Up to date   Eye Exam Up to date   Exercise (times per week) 1 times per week   Current Exercises Include Yard Work   Current Exercise Activities Include -   Do you need help using the phone?  No   Are you deaf or do you have serious difficulty hearing?  No   Do you need help with transportation? No   Do you need help shopping? No   Do you need help preparing meals?  No   Do you need help with housework?  No   Do you need help with laundry? No   Do you need help taking your medications? No   Do you need help managing money? No   Do you ever drive or ride in a car without wearing a seat belt? No   Have you felt unusual stress, anger or loneliness in the last month? No   Who do you live with? Spouse   If you need help, do you have trouble finding someone available to you? No   Have you been  bothered in the last four weeks by sexual problems? No   Do you have difficulty concentrating, remembering or making decisions? No       Age-appropriate Screening Schedule:  Refer to the list below for future screening recommendations based on patient's age, sex and/or medical conditions. Orders for these recommended tests are listed in the plan section. The patient has been provided with a written plan.    Health Maintenance   Topic Date Due   • Pneumococcal Vaccine 65+ (2 - PPSV23 if available, else PCV20) 04/17/2016   • COVID-19 Vaccine (4 - Booster for Pfizer series) 12/02/2021   • ZOSTER VACCINE (1 of 2) 04/05/2023 (Originally 2/9/2001)   • TDAP/TD VACCINES (1 - Tdap) 04/05/2024 (Originally 2/9/1969)   • INFLUENZA VACCINE  08/01/2023   • LIPID PANEL  10/11/2023   • ANNUAL WELLNESS VISIT  04/05/2024   • COLORECTAL CANCER SCREENING  10/11/2031   • HEPATITIS C SCREENING  Completed                  CMS Preventative Services Quick Reference  Risk Factors Identified During Encounter  Immunizations Discussed/Encouraged: Td, Influenza, Pneumococcal 23, Prevnar 20 (Pneumococcal 20-valent conjugate), Vaxneuvance (Pneumococcal 15-valent conjugate), Shingrix and COVID19  The above risks/problems have been discussed with the patient.  Pertinent information has been shared with the patient in the After Visit Summary.  An After Visit Summary and PPPS were made available to the patient.    Follow Up:   Next Medicare Wellness visit to be scheduled in 1 year.       Additional E&M Note during same encounter follows:  Patient has multiple medical problems which are significant and separately identifiable that require additional work above and beyond the Medicare Wellness Visit.      Chief Complaint  Medicare Wellness-subsequent and Hyperlipidemia (6 mth f/u)    Subjective        HPI  Jim Malik is also being seen today for allergic rhinitis/asthma arthritis intraductal papillary mucinous neoplasm of the pancreas  "hyperlipidemia.    These problems are stable.    Patient is getting neural ablations in his neck for radicular pain.  He is getting good result.    His allergies and asthma has been bad this year because of the pollen.         Objective   Vital Signs:  /88 (BP Location: Right arm, Patient Position: Sitting, Cuff Size: Adult)   Pulse 82   Temp 98.3 °F (36.8 °C) (Oral)   Resp 16   Ht 182.9 cm (72\")   Wt 111 kg (245 lb)   SpO2 95%   BMI 33.23 kg/m²     Physical Exam  Vitals and nursing note reviewed.   Constitutional:       Appearance: He is well-developed.   HENT:      Head: Normocephalic and atraumatic.   Eyes:      Pupils: Pupils are equal, round, and reactive to light.   Cardiovascular:      Rate and Rhythm: Normal rate and regular rhythm.      Pulses: Normal pulses.      Heart sounds: Normal heart sounds. No murmur heard.    No friction rub. No gallop.   Pulmonary:      Effort: Pulmonary effort is normal.      Breath sounds: Normal breath sounds.   Abdominal:      General: Bowel sounds are normal.      Palpations: Abdomen is soft.   Musculoskeletal:         General: Normal range of motion.      Cervical back: Neck supple.   Skin:     General: Skin is warm and dry.   Neurological:      Mental Status: He is oriented to person, place, and time.   Psychiatric:         Behavior: Behavior normal.         Thought Content: Thought content normal.         Judgment: Judgment normal.                         Assessment and Plan   Diagnoses and all orders for this visit:    1. Encounter for annual wellness exam in Medicare patient (Primary)    Other orders  -     albuterol sulfate HFA (Proventil HFA) 108 (90 Base) MCG/ACT inhaler; Inhale 2 puffs Every 4 (Four) Hours As Needed for Wheezing.  Dispense: 8 g; Refill: 5             Follow Up   Return in about 6 months (around 10/5/2023).  Patient was given instructions and counseling regarding his condition or for health maintenance advice. Please see specific " information pulled into the AVS if appropriate.

## 2023-04-11 ENCOUNTER — HOSPITAL ENCOUNTER (OUTPATIENT)
Dept: PAIN MEDICINE | Facility: HOSPITAL | Age: 73
Discharge: HOME | End: 2023-04-11
Payer: MEDICARE

## 2023-04-11 VITALS
RESPIRATION RATE: 16 BRPM | HEIGHT: 72 IN | SYSTOLIC BLOOD PRESSURE: 118 MMHG | BODY MASS INDEX: 33.18 KG/M2 | HEART RATE: 72 BPM | WEIGHT: 245 LBS | OXYGEN SATURATION: 98 % | TEMPERATURE: 97.8 F | DIASTOLIC BLOOD PRESSURE: 80 MMHG

## 2023-04-11 DIAGNOSIS — M47.812 CERVICAL SPONDYLOSIS: ICD-10-CM

## 2023-04-11 DIAGNOSIS — R52 PAIN: ICD-10-CM

## 2023-04-11 PROCEDURE — 64633 DESTROY CERV/THOR FACET JNT: CPT | Performed by: STUDENT IN AN ORGANIZED HEALTH CARE EDUCATION/TRAINING PROGRAM

## 2023-04-11 PROCEDURE — 25010000002 FENTANYL CITRATE (PF) 50 MCG/ML SOLUTION

## 2023-04-11 PROCEDURE — 77003 FLUOROGUIDE FOR SPINE INJECT: CPT

## 2023-04-11 PROCEDURE — 99152 MOD SED SAME PHYS/QHP 5/>YRS: CPT | Performed by: STUDENT IN AN ORGANIZED HEALTH CARE EDUCATION/TRAINING PROGRAM

## 2023-04-11 PROCEDURE — 25010000002 MIDAZOLAM PER 1 MG

## 2023-04-11 PROCEDURE — 64634 DESTROY C/TH FACET JNT ADDL: CPT | Performed by: STUDENT IN AN ORGANIZED HEALTH CARE EDUCATION/TRAINING PROGRAM

## 2023-04-11 RX ORDER — LIDOCAINE HYDROCHLORIDE 20 MG/ML
5 INJECTION, SOLUTION INFILTRATION; PERINEURAL ONCE
Status: COMPLETED | OUTPATIENT
Start: 2023-04-11 | End: 2023-04-11

## 2023-04-11 RX ORDER — FENTANYL CITRATE 50 UG/ML
100 INJECTION, SOLUTION INTRAMUSCULAR; INTRAVENOUS ONCE
Status: COMPLETED | OUTPATIENT
Start: 2023-04-11 | End: 2023-04-11

## 2023-04-11 RX ORDER — MIDAZOLAM HYDROCHLORIDE 1 MG/ML
INJECTION INTRAMUSCULAR; INTRAVENOUS
Status: COMPLETED
Start: 2023-04-11 | End: 2023-04-11

## 2023-04-11 RX ORDER — FENTANYL CITRATE 50 UG/ML
INJECTION, SOLUTION INTRAMUSCULAR; INTRAVENOUS
Status: COMPLETED
Start: 2023-04-11 | End: 2023-04-11

## 2023-04-11 RX ORDER — MIDAZOLAM HYDROCHLORIDE 1 MG/ML
2 INJECTION INTRAMUSCULAR; INTRAVENOUS ONCE
Status: COMPLETED | OUTPATIENT
Start: 2023-04-11 | End: 2023-04-11

## 2023-04-11 RX ORDER — LIDOCAINE HYDROCHLORIDE 10 MG/ML
5 INJECTION, SOLUTION EPIDURAL; INFILTRATION; INTRACAUDAL; PERINEURAL ONCE
Status: COMPLETED | OUTPATIENT
Start: 2023-04-11 | End: 2023-04-11

## 2023-04-11 RX ADMIN — LIDOCAINE HYDROCHLORIDE 5 ML: 20 INJECTION, SOLUTION INFILTRATION; PERINEURAL at 13:12

## 2023-04-11 RX ADMIN — MIDAZOLAM 2 MG: 1 INJECTION INTRAMUSCULAR; INTRAVENOUS at 13:02

## 2023-04-11 RX ADMIN — LIDOCAINE HYDROCHLORIDE 5 ML: 10 INJECTION, SOLUTION EPIDURAL; INFILTRATION; INTRACAUDAL; PERINEURAL at 13:05

## 2023-04-11 RX ADMIN — FENTANYL CITRATE 75 MCG: 50 INJECTION, SOLUTION INTRAMUSCULAR; INTRAVENOUS at 13:03

## 2023-04-11 RX ADMIN — MIDAZOLAM HYDROCHLORIDE 2 MG: 1 INJECTION INTRAMUSCULAR; INTRAVENOUS at 13:02

## 2023-04-11 NOTE — DISCHARGE INSTRUCTIONS
Radiofrequency Lesioning, Care After    Refer to this sheet in the next few weeks. These instructions provide you with information about caring for yourself after your procedure. Your health care provider may also give you more specific instructions. Your treatment has been planned according to current medical practices, but problems sometimes occur. Call your health care provider if you have any problems or questions after your procedure.  What can I expect after the procedure?  After the procedure, it is common to have:  Pain from the burned nerve.  You may feel a burning sensation for up to 1-2 weeks after the procedure  Temporary numbness at the site  Your leg/legs may be weak or feel numb after the procedure until the numbing medication wears off.  When you are up and walking, have assistance to prevent falling.     Follow these instructions at home:  Take over-the-counter and prescription medicines only as told by your health care provider.  Return to your normal activities as told by your health care provider. Ask your health care provider what activities are safe for you.  Pay close attention to how you feel after the procedure. If you start to have pain, write down when it hurts and how it feels. This will help you and your health care provider to know if you need an additional treatment.  Check your needle insertion site every day for signs of infection. Watch for:  Redness, swelling, or pain.  Fluid, blood, or pus.  Keep all follow-up visits as told by your health care provider. This is important.  No driving for 24 hrs-make sure you have full sensation in your legs prior to driving.  Avoid using heat on the injection site for 24 hours. You may use ice intermittently if needed by placing a               towel between your skin and the ice bag and using the ice for 20 minutes 2-3 times a day.  Do not take baths, swim or use a hot tub for 24 hours.  Leave your band-aids on for 24 hours and keep them  dry.    Contact a health care provider if:  Your pain does not get better.  You have redness, swelling, or pain at the needle insertion site.  You have fluid, blood, or pus coming from the needle insertion site.  You have a fever over 101 degrees.  You have new numb.ness in your arm or leg after the procedure    Get help right away if:  You develop sudden, severe pain.  You develop numbness or tingling near the procedure site that does not go away.  This information is not intended to replace advice given to you by your health care provider. Make sure you discuss any questions you have with your health care provider.  Document Released: 08/16/2012 Document Revised: 05/25/2017 Document Reviewed: 01/25/2016  [employer] Interactive Patient Education © 2019 [employer]    Moderate Conscious Sedation, Adult, Care After    This sheet gives you information about how to care for yourself after your procedure. Your health care provider may also give you more specific instructions. If you have problems or questions, contact your health care provider.    What can I expect after the procedure?  After the procedure, it is common to have:  Sleepiness for several hours.  Impaired judgment for several hours.  Difficulty with balance.  Vomiting if you eat too soon.    Follow these instructions at home:  For the next 24 hours:         Rest.  Do not participate in activities where you could fall or become injured.  Do not drive or use machinery.  Do not drink alcohol.  Do not take sleeping pills or medicines that cause drowsiness.  Do not make important decisions or sign legal documents.  Do not take care of children on your own.    Eating and drinking    Follow the diet recommended by your health care provider.  Drink enough fluid to keep your urine pale yellow.  If you vomit:  Drink water, juice, or soup when you can drink without vomiting.  Make sure you have little or no nausea before eating solid foods.     General  instructions  Take over-the-counter and prescription medicines only as told by your health care provider.  Have a responsible adult stay with you for 24 hours. It is important to have someone help care for you until you are awake and alert.  Do not smoke.  Keep all follow-up visits as told by your health care provider. This is important.    Contact a health care provider if:  You are still sleepy or having trouble with balance after 24 hours.  You feel light-headed.  You keep feeling nauseous or you keep vomiting.  You develop a rash.  You have a fever.  You have redness or swelling around the IV site.    Get help right away if:  You have trouble breathing.  You have new-onset confusion at home.    Summary  After the procedure, it is common to feel sleepy, have impaired judgment, or feel nauseous if you eat too soon.  Rest after you get home. Know the things you should not do after the procedure.  Follow the diet recommended by your health care provider and drink enough fluid to keep your urine pale yellow.  Get help right away if you have trouble breathing or new-onset confusion at home.    This information is not intended to replace advice given to you by your health care provider. Make sure you discuss any questions you have with your health care provider.    Document Revised: 04/16/2021 Document Reviewed: 11/12/2020  [employer] Patient Education © 2021 [employer]

## 2023-04-11 NOTE — PROCEDURES
Preoperative Diagnosis:    Cervical Spondylosis                                              Postoperative Diagnosis: SAME    PROCEDURE:  Radiofrequency Ablation (RFA) of Medial Branch at Left C 2/TON, C3    NOTE:  After obtaining written informed consent patient was taken to the procedure room. Pre-procedure blood pressure and pulse were stable and recorded in patients clinic chart.     Patient was placed in lateral position with left side cervical area up.  Area was prepped with ChloraPrep x2 and draped in usual sterile fashion.  The skin over left  C2 transverse process with vertebral body was infiltrated with 1% lidocaine for local anesthesia.  20-gauge RF needle 50 mm with 5 mm active tip was inserted close to right C3 medial branch under radiographic guidance.  Both AP and lateral views were obtained.    Another needle was placed at the facet line between C2 and C3 to target TON with the following same protocol above.    Following placement of the needle sensory stimulation at 50 Hz and motor stimulation at 2 Hz was carried out. 1 cc of 2% lidocaine was injected. RFA was carried out in lesion mode after patient reporting reproduction of pain or sensation in the area of symptoms and denying extremity motor sensations. The settings were 80°C, and 90 seconds.  Needle tip was oriented 180 degrees and repeat RFA was performed at same settings for 90 seconds.  During the procedure no pain was reported in the extremities by the patient.     After the procedure the needles were removed. Skin was cleaned and a sterile dressing was applied. Following the procedure the patient's vital signs were stable. The patient was discharged.     COMPLICATIONS: None    Moderate sedation: 75 mcg Fentanly; 2 mg Versed ; 15 minutes of moderate sedation was done and vitals were monitored closely during this periods.  ETCO2, RR, BP, HR, O2 were monitored closely throughout sedation period.     Tyler Burris DO  Pain Management   [facility]

## 2023-04-11 NOTE — H&P
H and P reviewed from previous visit and no changes to patient's clinical presentation. Will proceed with procedure as planned. Patient denies history of DM and being on blood thinners.       ASA-3  MP-2    Tyler Burris DO  Pain Management   [facility]

## 2023-04-12 ENCOUNTER — TELEPHONE (OUTPATIENT)
Dept: PAIN MEDICINE | Facility: HOSPITAL | Age: 73
End: 2023-04-12
Payer: MEDICARE

## 2023-05-01 RX ORDER — ASPIRIN 81 MG/1
TABLET, COATED ORAL
Qty: 90 TABLET | Refills: 1 | Status: SHIPPED | OUTPATIENT
Start: 2023-05-01

## 2023-05-01 RX ORDER — NAPROXEN 500 MG/1
TABLET ORAL
Qty: 60 TABLET | Refills: 5 | Status: SHIPPED | OUTPATIENT
Start: 2023-05-01

## 2023-05-01 RX ORDER — ROSUVASTATIN CALCIUM 5 MG/1
TABLET, COATED ORAL
Qty: 90 TABLET | Refills: 1 | Status: SHIPPED | OUTPATIENT
Start: 2023-05-01
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
## 2023-05-10 ENCOUNTER — OFFICE VISIT (OUTPATIENT)
Dept: PAIN MEDICINE | Facility: CLINIC | Age: 73
End: 2023-05-10
Payer: MEDICARE

## 2023-05-10 VITALS
DIASTOLIC BLOOD PRESSURE: 89 MMHG | SYSTOLIC BLOOD PRESSURE: 136 MMHG | OXYGEN SATURATION: 95 % | RESPIRATION RATE: 16 BRPM | HEART RATE: 78 BPM

## 2023-05-10 DIAGNOSIS — M79.18 MYOFASCIAL PAIN SYNDROME: ICD-10-CM

## 2023-05-10 DIAGNOSIS — M50.30 DDD (DEGENERATIVE DISC DISEASE), CERVICAL: ICD-10-CM

## 2023-05-10 DIAGNOSIS — M54.81 BILATERAL OCCIPITAL NEURALGIA: ICD-10-CM

## 2023-05-10 DIAGNOSIS — M47.812 CERVICAL SPONDYLOSIS: Primary | ICD-10-CM

## 2023-05-10 RX ORDER — TIZANIDINE 4 MG/1
4 TABLET ORAL NIGHTLY PRN
Qty: 30 TABLET | Refills: 1 | Status: SHIPPED | OUTPATIENT
Start: 2023-05-10 | End: 2023-07-09

## 2023-06-05 DIAGNOSIS — M79.18 MYOFASCIAL PAIN SYNDROME: ICD-10-CM

## 2023-06-05 RX ORDER — TIZANIDINE 4 MG/1
4 TABLET ORAL NIGHTLY PRN
Qty: 30 TABLET | Refills: 1 | OUTPATIENT
Start: 2023-06-05 | End: 2023-08-04

## 2023-07-28 RX ORDER — MONTELUKAST SODIUM 10 MG/1
TABLET ORAL
Qty: 90 TABLET | Refills: 1 | Status: SHIPPED | OUTPATIENT
Start: 2023-07-28

## 2023-07-28 RX ORDER — SILDENAFIL 100 MG/1
TABLET, FILM COATED ORAL
Qty: 12 TABLET | Refills: 7 | Status: SHIPPED | OUTPATIENT
Start: 2023-07-28

## 2023-08-21 DIAGNOSIS — M79.18 MYOFASCIAL PAIN SYNDROME: ICD-10-CM

## 2023-08-21 RX ORDER — TIZANIDINE 4 MG/1
4 TABLET ORAL NIGHTLY PRN
Qty: 30 TABLET | Refills: 1 | OUTPATIENT
Start: 2023-08-21 | End: 2023-10-20

## 2023-12-01 NOTE — PROGRESS NOTES
Subjective   History of Present Illness: Jim Malik is a 73 y.o. male is here today for follow-up for severe neck pain as well as suboccipital pain.  Briefly, the patient was last seen several months ago for the symptoms.  He denies any upper extremity symptoms.  He has now undergone multiple injections and ablations C2 3.  These ago provided him with very significant relief that is short-lived.  This pain is becoming progressively more severe and affecting his daily life    Chief Complaint   Patient presents with    Neck Pain     Follow up           Previous treatment: Naproxen,    Previous neurosurgery:     Previous injections: Cervical Facet injections, RFA on 4/4/23, MBB on 2/17/23 and 3/9/23.    The following portions of the patient's history were reviewed and updated as appropriate: allergies, current medications, past family history, past medical history, past social history, past surgical history, and problem list.    Review of Systems   Constitutional:  Positive for activity change.   Eyes: Negative.    Respiratory: Negative.     Cardiovascular: Negative.    Gastrointestinal: Negative.    Endocrine: Negative.    Musculoskeletal:  Positive for arthralgias, myalgias, neck pain and neck stiffness.   Skin: Negative.    Allergic/Immunologic: Negative.    Neurological:  Positive for headaches.   Hematological: Negative.    Psychiatric/Behavioral:  Positive for sleep disturbance.        Objective      /94   Pulse 73   Wt 108 kg (239 lb)   BMI 32.41 kg/m²    Body mass index is 32.41 kg/m².  Vitals:    12/04/23 1343   PainSc:   7           Neurologic Exam    Assessment & Plan   Independent Review of Radiographic Studies:      I personally reviewed and interpreted the images from the following studies.    No new imaging    Medical Decision Making:      Jim Malik is a 73 y.o. male with a history of severe neck pain listhesis at C2-3 that is dynamic as well as spondylolisthesis at the C3-4 with severe  disc degeneration.  He has degenerative changes at other levels but without spondylolisthesis.  He has gotten significant relief with multiple injections ablations etc. at C2-3.  Unfortunately these only provide short-lived improvement.  Given findings on imaging and failure of conservative measures he would benefit from C2-4 ACDF.      Diagnoses and all orders for this visit:    1. Acquired spondylolisthesis of cervical vertebra (Primary)    2. DDD (degenerative disc disease), cervical      No follow-ups on file.    This patient was examined wearing appropriate personal protective equipment.                      Dr. Jim Lombardo IV    12/04/23  14:09 EST

## 2023-12-04 ENCOUNTER — OFFICE VISIT (OUTPATIENT)
Dept: NEUROSURGERY | Facility: CLINIC | Age: 73
End: 2023-12-04
Payer: MEDICARE

## 2023-12-04 ENCOUNTER — PREP FOR SURGERY (OUTPATIENT)
Dept: OTHER | Facility: HOSPITAL | Age: 73
End: 2023-12-04
Payer: MEDICARE

## 2023-12-04 VITALS
WEIGHT: 239 LBS | SYSTOLIC BLOOD PRESSURE: 165 MMHG | BODY MASS INDEX: 32.41 KG/M2 | HEART RATE: 73 BPM | DIASTOLIC BLOOD PRESSURE: 94 MMHG

## 2023-12-04 DIAGNOSIS — R79.9 ABNORMAL FINDING OF BLOOD CHEMISTRY, UNSPECIFIED: ICD-10-CM

## 2023-12-04 DIAGNOSIS — M50.30 DDD (DEGENERATIVE DISC DISEASE), CERVICAL: ICD-10-CM

## 2023-12-04 DIAGNOSIS — M43.12 ACQUIRED SPONDYLOLISTHESIS OF CERVICAL VERTEBRA: Primary | ICD-10-CM

## 2023-12-04 DIAGNOSIS — R79.1 ABNORMAL COAGULATION PROFILE: ICD-10-CM

## 2023-12-04 PROCEDURE — 1160F RVW MEDS BY RX/DR IN RCRD: CPT | Performed by: NEUROLOGICAL SURGERY

## 2023-12-04 PROCEDURE — 1159F MED LIST DOCD IN RCRD: CPT | Performed by: NEUROLOGICAL SURGERY

## 2023-12-04 PROCEDURE — 99213 OFFICE O/P EST LOW 20 MIN: CPT | Performed by: NEUROLOGICAL SURGERY

## 2023-12-20 PROBLEM — M43.12 ACQUIRED SPONDYLOLISTHESIS OF CERVICAL VERTEBRA: Status: ACTIVE | Noted: 2023-12-04

## 2023-12-21 ENCOUNTER — OFFICE (OUTPATIENT)
Dept: URBAN - METROPOLITAN AREA CLINIC 64 | Facility: CLINIC | Age: 73
End: 2023-12-21

## 2023-12-21 ENCOUNTER — LAB (OUTPATIENT)
Dept: LAB | Facility: HOSPITAL | Age: 73
End: 2023-12-21
Payer: MEDICARE

## 2023-12-21 ENCOUNTER — HOSPITAL ENCOUNTER (OUTPATIENT)
Dept: CARDIOLOGY | Facility: HOSPITAL | Age: 73
Discharge: HOME | End: 2023-12-21
Payer: MEDICARE

## 2023-12-21 VITALS
WEIGHT: 216 LBS | HEIGHT: 72 IN | DIASTOLIC BLOOD PRESSURE: 105 MMHG | SYSTOLIC BLOOD PRESSURE: 169 MMHG | DIASTOLIC BLOOD PRESSURE: 104 MMHG | HEART RATE: 68 BPM | SYSTOLIC BLOOD PRESSURE: 164 MMHG

## 2023-12-21 DIAGNOSIS — K86.2 CYST OF PANCREAS: ICD-10-CM

## 2023-12-21 DIAGNOSIS — R79.9 ABNORMAL FINDING OF BLOOD CHEMISTRY, UNSPECIFIED: ICD-10-CM

## 2023-12-21 DIAGNOSIS — M43.12 ACQUIRED SPONDYLOLISTHESIS OF CERVICAL VERTEBRA: ICD-10-CM

## 2023-12-21 DIAGNOSIS — R79.1 ABNORMAL COAGULATION PROFILE: ICD-10-CM

## 2023-12-21 LAB
ABO GROUP BLD: NORMAL
ANION GAP SERPL CALCULATED.3IONS-SCNC: 9 MMOL/L (ref 5–15)
BASOPHILS # BLD AUTO: 0.05 10*3/MM3 (ref 0–0.2)
BASOPHILS NFR BLD AUTO: 0.9 % (ref 0–1.5)
BILIRUB UR QL STRIP: NEGATIVE
BLD GP AB SCN SERPL QL: NEGATIVE
BUN SERPL-MCNC: 21 MG/DL (ref 8–23)
BUN/CREAT SERPL: 17.4 (ref 7–25)
CALCIUM SPEC-SCNC: 10 MG/DL (ref 8.6–10.5)
CHLORIDE SERPL-SCNC: 104 MMOL/L (ref 98–107)
CLARITY UR: CLEAR
CO2 SERPL-SCNC: 30 MMOL/L (ref 22–29)
COLOR UR: YELLOW
CREAT SERPL-MCNC: 1.21 MG/DL (ref 0.76–1.27)
DEPRECATED RDW RBC AUTO: 42.2 FL (ref 37–54)
EGFRCR SERPLBLD CKD-EPI 2021: 63.6 ML/MIN/1.73
EOSINOPHIL # BLD AUTO: 0.26 10*3/MM3 (ref 0–0.4)
EOSINOPHIL NFR BLD AUTO: 4.7 % (ref 0.3–6.2)
ERYTHROCYTE [DISTWIDTH] IN BLOOD BY AUTOMATED COUNT: 12.1 % (ref 12.3–15.4)
GLUCOSE SERPL-MCNC: 96 MG/DL (ref 65–99)
GLUCOSE UR STRIP-MCNC: NEGATIVE MG/DL
HBA1C MFR BLD: 5.6 % (ref 4.8–5.6)
HCT VFR BLD AUTO: 40.7 % (ref 37.5–51)
HGB BLD-MCNC: 13.5 G/DL (ref 13–17.7)
HGB UR QL STRIP.AUTO: NEGATIVE
IMM GRANULOCYTES # BLD AUTO: 0.01 10*3/MM3 (ref 0–0.05)
IMM GRANULOCYTES NFR BLD AUTO: 0.2 % (ref 0–0.5)
INR PPP: 0.98 (ref 0.93–1.1)
KETONES UR QL STRIP: NEGATIVE
LEUKOCYTE ESTERASE UR QL STRIP.AUTO: NEGATIVE
LYMPHOCYTES # BLD AUTO: 1.7 10*3/MM3 (ref 0.7–3.1)
LYMPHOCYTES NFR BLD AUTO: 30.8 % (ref 19.6–45.3)
MCH RBC QN AUTO: 31.8 PG (ref 26.6–33)
MCHC RBC AUTO-ENTMCNC: 33.2 G/DL (ref 31.5–35.7)
MCV RBC AUTO: 96 FL (ref 79–97)
MONOCYTES # BLD AUTO: 0.39 10*3/MM3 (ref 0.1–0.9)
MONOCYTES NFR BLD AUTO: 7.1 % (ref 5–12)
MRSA DNA SPEC QL NAA+PROBE: NORMAL
NEUTROPHILS NFR BLD AUTO: 3.11 10*3/MM3 (ref 1.7–7)
NEUTROPHILS NFR BLD AUTO: 56.3 % (ref 42.7–76)
NITRITE UR QL STRIP: NEGATIVE
NRBC BLD AUTO-RTO: 0 /100 WBC (ref 0–0.2)
PH UR STRIP.AUTO: 6.5 [PH] (ref 5–8)
PLATELET # BLD AUTO: 196 10*3/MM3 (ref 140–450)
PMV BLD AUTO: 10.7 FL (ref 6–12)
POTASSIUM SERPL-SCNC: 4.8 MMOL/L (ref 3.5–5.2)
PROT UR QL STRIP: NEGATIVE
PROTHROMBIN TIME: 10.7 SECONDS (ref 9.6–11.7)
RBC # BLD AUTO: 4.24 10*6/MM3 (ref 4.14–5.8)
RH BLD: NEGATIVE
SODIUM SERPL-SCNC: 143 MMOL/L (ref 136–145)
SP GR UR STRIP: 1.02 (ref 1–1.03)
T&S EXPIRATION DATE: NORMAL
UROBILINOGEN UR QL STRIP: NORMAL
WBC NRBC COR # BLD AUTO: 5.52 10*3/MM3 (ref 3.4–10.8)

## 2023-12-21 PROCEDURE — 86900 BLOOD TYPING SEROLOGIC ABO: CPT | Performed by: NEUROLOGICAL SURGERY

## 2023-12-21 PROCEDURE — 85025 COMPLETE CBC W/AUTO DIFF WBC: CPT

## 2023-12-21 PROCEDURE — 81003 URINALYSIS AUTO W/O SCOPE: CPT

## 2023-12-21 PROCEDURE — 93005 ELECTROCARDIOGRAM TRACING: CPT | Performed by: NEUROLOGICAL SURGERY

## 2023-12-21 PROCEDURE — 86850 RBC ANTIBODY SCREEN: CPT | Performed by: NEUROLOGICAL SURGERY

## 2023-12-21 PROCEDURE — 80048 BASIC METABOLIC PNL TOTAL CA: CPT

## 2023-12-21 PROCEDURE — 85610 PROTHROMBIN TIME: CPT

## 2023-12-21 PROCEDURE — 86901 BLOOD TYPING SEROLOGIC RH(D): CPT

## 2023-12-21 PROCEDURE — 83036 HEMOGLOBIN GLYCOSYLATED A1C: CPT

## 2023-12-21 PROCEDURE — 86900 BLOOD TYPING SEROLOGIC ABO: CPT

## 2023-12-21 PROCEDURE — 99214 OFFICE O/P EST MOD 30 MIN: CPT | Performed by: INTERNAL MEDICINE

## 2023-12-21 PROCEDURE — 36415 COLL VENOUS BLD VENIPUNCTURE: CPT

## 2023-12-21 PROCEDURE — 86901 BLOOD TYPING SEROLOGIC RH(D): CPT | Performed by: NEUROLOGICAL SURGERY

## 2023-12-21 PROCEDURE — 87641 MR-STAPH DNA AMP PROBE: CPT

## 2023-12-22 ENCOUNTER — TRANSCRIBE ORDERS (OUTPATIENT)
Dept: ADMINISTRATIVE | Facility: HOSPITAL | Age: 73
End: 2023-12-22
Payer: MEDICARE

## 2023-12-22 DIAGNOSIS — K86.2 PANCREATIC CYST: Primary | ICD-10-CM

## 2023-12-22 LAB
QT INTERVAL: 394 MS
QTC INTERVAL: 425 MS

## 2023-12-28 ENCOUNTER — ANESTHESIA (OUTPATIENT)
Dept: PERIOP | Facility: HOSPITAL | Age: 73
End: 2023-12-28
Payer: MEDICARE

## 2023-12-28 ENCOUNTER — APPOINTMENT (OUTPATIENT)
Dept: GENERAL RADIOLOGY | Facility: HOSPITAL | Age: 73
End: 2023-12-28
Payer: MEDICARE

## 2023-12-28 ENCOUNTER — HOSPITAL ENCOUNTER (OUTPATIENT)
Facility: HOSPITAL | Age: 73
Setting detail: HOSPITAL OUTPATIENT SURGERY
Discharge: HOME | End: 2023-12-28
Attending: NEUROLOGICAL SURGERY | Admitting: NEUROLOGICAL SURGERY
Payer: MEDICARE

## 2023-12-28 ENCOUNTER — ANESTHESIA EVENT (OUTPATIENT)
Dept: PERIOP | Facility: HOSPITAL | Age: 73
End: 2023-12-28
Payer: MEDICARE

## 2023-12-28 VITALS
OXYGEN SATURATION: 93 % | RESPIRATION RATE: 15 BRPM | TEMPERATURE: 97.5 F | HEART RATE: 84 BPM | HEIGHT: 72 IN | DIASTOLIC BLOOD PRESSURE: 91 MMHG | WEIGHT: 229 LBS | SYSTOLIC BLOOD PRESSURE: 146 MMHG | BODY MASS INDEX: 31.02 KG/M2

## 2023-12-28 DIAGNOSIS — M43.12 ACQUIRED SPONDYLOLISTHESIS OF CERVICAL VERTEBRA: ICD-10-CM

## 2023-12-28 DIAGNOSIS — Z98.1 S/P SPINAL FUSION: Primary | ICD-10-CM

## 2023-12-28 PROCEDURE — 22552 ARTHRD ANT NTRBD CERVICAL EA: CPT | Performed by: NEUROLOGICAL SURGERY

## 2023-12-28 PROCEDURE — 25010000002 PROPOFOL 200 MG/20ML EMULSION

## 2023-12-28 PROCEDURE — 22551 ARTHRD ANT NTRBDY CERVICAL: CPT | Performed by: NEUROLOGICAL SURGERY

## 2023-12-28 PROCEDURE — 25010000002 CEFAZOLIN PER 500 MG: Performed by: NEUROLOGICAL SURGERY

## 2023-12-28 PROCEDURE — 25010000002 FENTANYL CITRATE (PF) 100 MCG/2ML SOLUTION

## 2023-12-28 PROCEDURE — C1713 ANCHOR/SCREW BN/BN,TIS/BN: HCPCS | Performed by: NEUROLOGICAL SURGERY

## 2023-12-28 PROCEDURE — 25010000002 SUGAMMADEX 200 MG/2ML SOLUTION

## 2023-12-28 PROCEDURE — 25810000003 SODIUM CHLORIDE 0.9 % SOLUTION 250 ML FLEX CONT

## 2023-12-28 PROCEDURE — 25010000002 METHYLPREDNISOLONE PER 40 MG: Performed by: NEUROLOGICAL SURGERY

## 2023-12-28 PROCEDURE — 25010000002 DEXAMETHASONE PER 1 MG

## 2023-12-28 PROCEDURE — 25810000003 LACTATED RINGERS PER 1000 ML: Performed by: NEUROLOGICAL SURGERY

## 2023-12-28 PROCEDURE — 72040 X-RAY EXAM NECK SPINE 2-3 VW: CPT

## 2023-12-28 PROCEDURE — 25010000002 PHENYLEPHRINE 10 MG/ML SOLUTION 5 ML VIAL

## 2023-12-28 PROCEDURE — 76000 FLUOROSCOPY <1 HR PHYS/QHP: CPT

## 2023-12-28 PROCEDURE — 25010000002 SUCCINYLCHOLINE PER 20 MG

## 2023-12-28 PROCEDURE — 22853 INSJ BIOMECHANICAL DEVICE: CPT | Performed by: NEUROLOGICAL SURGERY

## 2023-12-28 PROCEDURE — 22845 INSERT SPINE FIXATION DEVICE: CPT | Performed by: NEUROLOGICAL SURGERY

## 2023-12-28 PROCEDURE — 25010000002 HYDROMORPHONE 1 MG/ML SOLUTION

## 2023-12-28 PROCEDURE — 25010000002 ONDANSETRON PER 1 MG

## 2023-12-28 DEVICE — ALLOGRFT FIBR OSTEOAMP SELECT 2.5CC: Type: IMPLANTABLE DEVICE | Site: SPINE CERVICAL | Status: FUNCTIONAL

## 2023-12-28 DEVICE — DEV CONTRL TISS STRATAFIX SPIRAL MNCRYL PLSPS2 4/0 70CM UD: Type: IMPLANTABLE DEVICE | Site: SPINE CERVICAL | Status: FUNCTIONAL

## 2023-12-28 DEVICE — COALITION MIS ANCHOR, 12MM
Type: IMPLANTABLE DEVICE | Site: SPINE CERVICAL | Status: FUNCTIONAL
Brand: COALITION MIS

## 2023-12-28 DEVICE — I-FACTOR PUTTY, 2.5CC SYRINGE
Type: IMPLANTABLE DEVICE | Site: SPINE CERVICAL | Status: FUNCTIONAL
Brand: I-FACTOR PEPTIDE ENHANCED BONE GRAFT

## 2023-12-28 DEVICE — FLOSEAL WITH RECOTHROM - 10ML.
Type: IMPLANTABLE DEVICE | Site: SPINE CERVICAL | Status: FUNCTIONAL
Brand: FLOSEAL HEMOSTATIC MATRIX

## 2023-12-28 DEVICE — COALITION MIS SPACER, 14 X 16, 7&DEG;, 7MM
Type: IMPLANTABLE DEVICE | Site: SPINE CERVICAL | Status: FUNCTIONAL
Brand: COALITION MIS

## 2023-12-28 RX ORDER — FLUMAZENIL 0.1 MG/ML
0.1 INJECTION INTRAVENOUS AS NEEDED
Status: DISCONTINUED | OUTPATIENT
Start: 2023-12-28 | End: 2023-12-28 | Stop reason: HOSPADM

## 2023-12-28 RX ORDER — LIDOCAINE HYDROCHLORIDE AND EPINEPHRINE 10; 10 MG/ML; UG/ML
INJECTION, SOLUTION INFILTRATION; PERINEURAL AS NEEDED
Status: DISCONTINUED | OUTPATIENT
Start: 2023-12-28 | End: 2023-12-28 | Stop reason: HOSPADM

## 2023-12-28 RX ORDER — SODIUM CHLORIDE, SODIUM LACTATE, POTASSIUM CHLORIDE, CALCIUM CHLORIDE 600; 310; 30; 20 MG/100ML; MG/100ML; MG/100ML; MG/100ML
1000 INJECTION, SOLUTION INTRAVENOUS CONTINUOUS
Status: DISCONTINUED | OUTPATIENT
Start: 2023-12-28 | End: 2023-12-28 | Stop reason: HOSPADM

## 2023-12-28 RX ORDER — ONDANSETRON 2 MG/ML
4 INJECTION INTRAMUSCULAR; INTRAVENOUS ONCE AS NEEDED
Status: DISCONTINUED | OUTPATIENT
Start: 2023-12-28 | End: 2023-12-28 | Stop reason: HOSPADM

## 2023-12-28 RX ORDER — LIDOCAINE HYDROCHLORIDE 10 MG/ML
0.5 INJECTION, SOLUTION INFILTRATION; PERINEURAL ONCE AS NEEDED
Status: DISCONTINUED | OUTPATIENT
Start: 2023-12-28 | End: 2023-12-28 | Stop reason: HOSPADM

## 2023-12-28 RX ORDER — ALBUTEROL SULFATE 2.5 MG/3ML
2.5 SOLUTION RESPIRATORY (INHALATION) ONCE AS NEEDED
Status: DISCONTINUED | OUTPATIENT
Start: 2023-12-28 | End: 2023-12-28 | Stop reason: HOSPADM

## 2023-12-28 RX ORDER — PROPOFOL 10 MG/ML
INJECTION, EMULSION INTRAVENOUS AS NEEDED
Status: DISCONTINUED | OUTPATIENT
Start: 2023-12-28 | End: 2023-12-28 | Stop reason: SURG

## 2023-12-28 RX ORDER — PROCHLORPERAZINE EDISYLATE 5 MG/ML
10 INJECTION INTRAMUSCULAR; INTRAVENOUS ONCE AS NEEDED
Status: DISCONTINUED | OUTPATIENT
Start: 2023-12-28 | End: 2023-12-28 | Stop reason: HOSPADM

## 2023-12-28 RX ORDER — LIDOCAINE HYDROCHLORIDE 20 MG/ML
INJECTION, SOLUTION EPIDURAL; INFILTRATION; INTRACAUDAL; PERINEURAL AS NEEDED
Status: DISCONTINUED | OUTPATIENT
Start: 2023-12-28 | End: 2023-12-28 | Stop reason: SURG

## 2023-12-28 RX ORDER — FENTANYL CITRATE 50 UG/ML
INJECTION, SOLUTION INTRAMUSCULAR; INTRAVENOUS AS NEEDED
Status: DISCONTINUED | OUTPATIENT
Start: 2023-12-28 | End: 2023-12-28 | Stop reason: SURG

## 2023-12-28 RX ORDER — HYDROCODONE BITARTRATE AND ACETAMINOPHEN 5; 325 MG/1; MG/1
1 TABLET ORAL EVERY 6 HOURS PRN
Qty: 26 TABLET | Refills: 0 | Status: SHIPPED | OUTPATIENT
Start: 2023-12-28

## 2023-12-28 RX ORDER — EPHEDRINE SULFATE 5 MG/ML
INJECTION INTRAVENOUS AS NEEDED
Status: DISCONTINUED | OUTPATIENT
Start: 2023-12-28 | End: 2023-12-28 | Stop reason: SURG

## 2023-12-28 RX ORDER — PHENYLEPHRINE HCL IN 0.9% NACL 1 MG/10 ML
SYRINGE (ML) INTRAVENOUS AS NEEDED
Status: DISCONTINUED | OUTPATIENT
Start: 2023-12-28 | End: 2023-12-28 | Stop reason: SURG

## 2023-12-28 RX ORDER — DIPHENHYDRAMINE HYDROCHLORIDE 50 MG/ML
12.5 INJECTION INTRAMUSCULAR; INTRAVENOUS
Status: DISCONTINUED | OUTPATIENT
Start: 2023-12-28 | End: 2023-12-28 | Stop reason: HOSPADM

## 2023-12-28 RX ORDER — SUCCINYLCHOLINE CHLORIDE 20 MG/ML
INJECTION INTRAMUSCULAR; INTRAVENOUS AS NEEDED
Status: DISCONTINUED | OUTPATIENT
Start: 2023-12-28 | End: 2023-12-28 | Stop reason: SURG

## 2023-12-28 RX ORDER — OXYCODONE HYDROCHLORIDE 5 MG/1
5 TABLET ORAL ONCE
Status: COMPLETED | OUTPATIENT
Start: 2023-12-28 | End: 2023-12-28

## 2023-12-28 RX ORDER — ACETAMINOPHEN 500 MG
1000 TABLET ORAL ONCE
Status: COMPLETED | OUTPATIENT
Start: 2023-12-28 | End: 2023-12-28

## 2023-12-28 RX ORDER — HYDRALAZINE HYDROCHLORIDE 20 MG/ML
5 INJECTION INTRAMUSCULAR; INTRAVENOUS
Status: DISCONTINUED | OUTPATIENT
Start: 2023-12-28 | End: 2023-12-28 | Stop reason: HOSPADM

## 2023-12-28 RX ORDER — NALOXONE HCL 0.4 MG/ML
0.4 VIAL (ML) INJECTION AS NEEDED
Status: DISCONTINUED | OUTPATIENT
Start: 2023-12-28 | End: 2023-12-28 | Stop reason: HOSPADM

## 2023-12-28 RX ORDER — LABETALOL HYDROCHLORIDE 5 MG/ML
5 INJECTION, SOLUTION INTRAVENOUS
Status: DISCONTINUED | OUTPATIENT
Start: 2023-12-28 | End: 2023-12-28 | Stop reason: HOSPADM

## 2023-12-28 RX ORDER — METHYLPREDNISOLONE ACETATE 40 MG/ML
INJECTION, SUSPENSION INTRA-ARTICULAR; INTRALESIONAL; INTRAMUSCULAR; SOFT TISSUE AS NEEDED
Status: DISCONTINUED | OUTPATIENT
Start: 2023-12-28 | End: 2023-12-28 | Stop reason: HOSPADM

## 2023-12-28 RX ORDER — FENTANYL CITRATE 50 UG/ML
25 INJECTION, SOLUTION INTRAMUSCULAR; INTRAVENOUS
Status: DISCONTINUED | OUTPATIENT
Start: 2023-12-28 | End: 2023-12-28 | Stop reason: HOSPADM

## 2023-12-28 RX ORDER — REMIFENTANIL HYDROCHLORIDE 1 MG/ML
INJECTION, POWDER, LYOPHILIZED, FOR SOLUTION INTRAVENOUS CONTINUOUS PRN
Status: DISCONTINUED | OUTPATIENT
Start: 2023-12-28 | End: 2023-12-28 | Stop reason: SURG

## 2023-12-28 RX ORDER — FENTANYL CITRATE 50 UG/ML
50 INJECTION, SOLUTION INTRAMUSCULAR; INTRAVENOUS
Status: DISCONTINUED | OUTPATIENT
Start: 2023-12-28 | End: 2023-12-28 | Stop reason: HOSPADM

## 2023-12-28 RX ORDER — ACETAMINOPHEN 650 MG/1
325 SUPPOSITORY RECTAL EVERY 4 HOURS PRN
Status: DISCONTINUED | OUTPATIENT
Start: 2023-12-28 | End: 2023-12-28 | Stop reason: HOSPADM

## 2023-12-28 RX ORDER — SODIUM CHLORIDE 0.9 % (FLUSH) 0.9 %
10 SYRINGE (ML) INJECTION AS NEEDED
Status: DISCONTINUED | OUTPATIENT
Start: 2023-12-28 | End: 2023-12-28 | Stop reason: HOSPADM

## 2023-12-28 RX ORDER — HYDROCODONE BITARTRATE AND ACETAMINOPHEN 7.5; 325 MG/1; MG/1
1 TABLET ORAL EVERY 4 HOURS PRN
Status: DISCONTINUED | OUTPATIENT
Start: 2023-12-28 | End: 2023-12-28 | Stop reason: HOSPADM

## 2023-12-28 RX ORDER — ACETAMINOPHEN 325 MG/1
650 TABLET ORAL ONCE AS NEEDED
Status: DISCONTINUED | OUTPATIENT
Start: 2023-12-28 | End: 2023-12-28 | Stop reason: HOSPADM

## 2023-12-28 RX ORDER — ROCURONIUM BROMIDE 10 MG/ML
INJECTION, SOLUTION INTRAVENOUS AS NEEDED
Status: DISCONTINUED | OUTPATIENT
Start: 2023-12-28 | End: 2023-12-28 | Stop reason: SURG

## 2023-12-28 RX ORDER — ONDANSETRON 2 MG/ML
INJECTION INTRAMUSCULAR; INTRAVENOUS AS NEEDED
Status: DISCONTINUED | OUTPATIENT
Start: 2023-12-28 | End: 2023-12-28 | Stop reason: SURG

## 2023-12-28 RX ORDER — CYCLOBENZAPRINE HCL 10 MG
10 TABLET ORAL 3 TIMES DAILY PRN
Qty: 60 TABLET | Refills: 0 | Status: SHIPPED | OUTPATIENT
Start: 2023-12-28

## 2023-12-28 RX ORDER — DEXAMETHASONE SODIUM PHOSPHATE 4 MG/ML
INJECTION, SOLUTION INTRA-ARTICULAR; INTRALESIONAL; INTRAMUSCULAR; INTRAVENOUS; SOFT TISSUE AS NEEDED
Status: DISCONTINUED | OUTPATIENT
Start: 2023-12-28 | End: 2023-12-28 | Stop reason: SURG

## 2023-12-28 RX ADMIN — Medication 100 MCG: at 07:59

## 2023-12-28 RX ADMIN — CEFAZOLIN 2 G: 2 INJECTION, POWDER, FOR SOLUTION INTRAMUSCULAR; INTRAVENOUS at 07:42

## 2023-12-28 RX ADMIN — HYDROCODONE BITARTRATE AND ACETAMINOPHEN 1 TABLET: 7.5; 325 TABLET ORAL at 10:38

## 2023-12-28 RX ADMIN — OXYCODONE 5 MG: 5 TABLET ORAL at 06:53

## 2023-12-28 RX ADMIN — SUGAMMADEX 200 MG: 100 INJECTION, SOLUTION INTRAVENOUS at 09:08

## 2023-12-28 RX ADMIN — ROCURONIUM BROMIDE 20 MG: 10 INJECTION, SOLUTION INTRAVENOUS at 07:35

## 2023-12-28 RX ADMIN — ONDANSETRON 4 MG: 2 INJECTION INTRAMUSCULAR; INTRAVENOUS at 09:08

## 2023-12-28 RX ADMIN — PROPOFOL 200 MG: 10 INJECTION, EMULSION INTRAVENOUS at 07:35

## 2023-12-28 RX ADMIN — ACETAMINOPHEN 1000 MG: 500 TABLET ORAL at 06:52

## 2023-12-28 RX ADMIN — EPHEDRINE SULFATE 5 MG: 5 INJECTION INTRAVENOUS at 09:13

## 2023-12-28 RX ADMIN — HYDROMORPHONE HYDROCHLORIDE 0.5 MG: 1 INJECTION, SOLUTION INTRAMUSCULAR; INTRAVENOUS; SUBCUTANEOUS at 10:27

## 2023-12-28 RX ADMIN — SODIUM CHLORIDE, POTASSIUM CHLORIDE, SODIUM LACTATE AND CALCIUM CHLORIDE 1000 ML: 600; 310; 30; 20 INJECTION, SOLUTION INTRAVENOUS at 06:52

## 2023-12-28 RX ADMIN — LIDOCAINE HYDROCHLORIDE 100 MG: 20 INJECTION, SOLUTION EPIDURAL; INFILTRATION; INTRACAUDAL; PERINEURAL at 07:35

## 2023-12-28 RX ADMIN — DEXAMETHASONE SODIUM PHOSPHATE 8 MG: 4 INJECTION, SOLUTION INTRAMUSCULAR; INTRAVENOUS at 08:02

## 2023-12-28 RX ADMIN — PHENYLEPHRINE HYDROCHLORIDE 0.3 MCG/KG/MIN: 10 INJECTION INTRAVENOUS at 07:46

## 2023-12-28 RX ADMIN — REMIFENTANIL HYDROCHLORIDE 0.1 MCG/KG/MIN: 5 INJECTION, POWDER, LYOPHILIZED, FOR SOLUTION INTRAVENOUS at 07:38

## 2023-12-28 RX ADMIN — PROPOFOL 150 MCG/KG/MIN: 10 INJECTION, EMULSION INTRAVENOUS at 07:38

## 2023-12-28 RX ADMIN — FENTANYL CITRATE 50 MCG: 50 INJECTION, SOLUTION INTRAMUSCULAR; INTRAVENOUS at 07:35

## 2023-12-28 RX ADMIN — SUCCINYLCHOLINE CHLORIDE 150 MG: 20 INJECTION, SOLUTION INTRAMUSCULAR; INTRAVENOUS at 07:35

## 2023-12-28 RX ADMIN — Medication 100 MCG: at 07:49

## 2023-12-28 RX ADMIN — HYDROMORPHONE HYDROCHLORIDE 0.25 MG: 1 INJECTION, SOLUTION INTRAMUSCULAR; INTRAVENOUS; SUBCUTANEOUS at 09:05

## 2023-12-28 RX ADMIN — HYDROMORPHONE HYDROCHLORIDE 0.5 MG: 1 INJECTION, SOLUTION INTRAMUSCULAR; INTRAVENOUS; SUBCUTANEOUS at 10:02

## 2023-12-28 RX ADMIN — Medication 200 MCG: at 08:05

## 2023-12-28 RX ADMIN — HYDROMORPHONE HYDROCHLORIDE 0.25 MG: 1 INJECTION, SOLUTION INTRAMUSCULAR; INTRAVENOUS; SUBCUTANEOUS at 09:33

## 2023-12-28 NOTE — OP NOTE
CERVICAL DISCECTOMY ANTERIOR FUSION WITH INSTRUMENTATION  Procedure Report    Patient Name:  Jim Malik  YOB: 1950    Date of Surgery:  12/28/2023     Indications: 73-year-old male with a history of severe neck pain refractory to conservative measures.  Imaging demonstrated spondylolisthesis at C2-3 with dynamic instability as well as spondylolisthesis at C3-4.  He did get significant improvement especially with injections and ablations at C2-3 but these were not lasting.  Given this patient was taken to the OR for C2-C4 ACDF.  Patient understood the risks of surgery including bleeding infection CSF leak nerve damage spinal cord injury esophageal or tracheal injury injury to other neck structure vascular injury stroke hardware failure pseudoarthrosis need for future operation among many other risks and he agreed to undergo the procedure    Pre-op Diagnosis:   Acquired spondylolisthesis of cervical vertebra [M43.12]       Post-Op Diagnosis Codes:     * Acquired spondylolisthesis of cervical vertebra [M43.12]    Procedure/CPT® Codes:      Procedure(s):  C2-3 and C3-4 anterior cervical discectomy for arthrodesis  C2-3 and C3-4 placement of globus peek interbody cage  C2-C4 anterior instrumentation with globus plates and fixation pins        Spinal Surgery Levels Completed:2 Levels      Staff:  Surgeon(s):  Jim Lombardo IV, MD    Assistant: Nupur Zamora CSA    Anesthesia: General    Estimated Blood Loss:  30cc    Implants:    Implant Name Type Inv. Item Serial No.  Lot No. LRB No. Used Action   DEV CONTRL TISS STRATAFIX SPIRAL MNCRYL [id number] 4/0 70CM UD - [id number] Implant DEV CONTRL TISS STRATAFIX SPIRAL MNCRYL PLSPS2 4/0 70CM UD  [employer]  DIV [employer] J [employer] J [id number] N/A 1 Implanted   KT SEAL HEMOS ABS FLOSEAL MATRX 1.5/FAST/PREP 5000/IU 10ML - [id number] Implant KT SEAL HEMOS ABS FLOSEAL MATRX 1.5/FAST/PREP 5000/IU 10ML  [employer] [id number] N/A 1 Implanted   GRFT BONE IFACTOR PUTTY  2.5ML - [id number] Implant GRFT BONE IFACTOR PUTTY 2.5ML  [employer] [id number] N/A 1 Implanted   ALLOGRFT FIBR OSTEOAMP SELECT 2.5CC - [id number] - [id number] Implant ALLOGRFT FIBR OSTEOAMP SELECT 2.5CC [id number] amcure . N/A 1 Implanted   KT SEAL HEMOS ABS FLOSEAL MATRX 1.5/FAST/PREP 5000/IU 10ML - [id number] Implant KT SEAL HEMOS ABS FLOSEAL MATRX 1.5/FAST/PREP 5000/IU 10ML  [facility] [id number] N/A 1 Implanted   SPACR ANT CERV COALITIONMIS TI 7DEG [id number] - [id number] Implant SPACR ANT CERV COALITIONMIS TI 7DEG [id number]  [employer] . N/A 2 Implanted   ANCHR COALITIONMIS 12MM - [id number] Implant ANCHR COALITIONMIS 12MM  [employer] . N/A 4 Implanted       Specimen:          None        Findings: None    Complications: None    Description of Procedure: Patient was brought to the OR and placed under general endotracheal anesthesia.  He was placed supine on regular OR table with his neck slightly extended.  Patient was prepped and draped in the usual fashion.  Incision was planned over the C3 vertebral body under AP and lateral fluoroscopy.  Incision was made and carried down to the deep dermal and fat layers with monopolar cautery.  Self-retaining retractor was placed exposing platysma.  Platysma was  from underlying neck structures and cut sharply with Metzenbaum scissors.  Planes of the neck were followed down to the anterior cervical spine.  The C3-4 disc space was identified on lateral fluoroscopy, and the longus coli muscles were  from the anterior cervical spine from C2-C4.  Corwin retractor system was placed at C2-3 and Ancram pins were placed in C2 and C3 vertebral bodies under fluoroscopic guidance.  Calcimar retractor system was placed opening the disc space.  Curette was used to remove soft disc and cartilaginous endplate.  Anterior osteophyte was removed with 3 Kerrison.  Microscope was brought in the field.  Remaining cartilaginous endplate was removed with  high-speed drill.  Posterior osteophyte was removed with a high-speed drill.  Hemostasis was achieved with bipolar cautery and Gelfoam powder.  PLL was not open given the fact that there was no nerve compression at this level.  Globus peek interbody cage filled with iFactor and allograft was passed in the disc space under fluoroscopic guidance.  Anterior instrumentation was performed with globus titanium plate and fixation pins.  Locking Was final tightened.  The retractor systems were then moved down to the C3-4 disc space.  The disc base was quite collapsed and high-speed drill was used to remove bone and fibrous remaining disc material.  Microscope was brought in the field.  Remaining posterior osteophytes were removed exposing the PLL.  PLL was opened with a 2 Kerrison and removed.  Foraminotomy was performed on the right.  Hemostasis was achieved with bipolar cautery and Gelfoam powder and the disc space was thoroughly irrigated.  Globus peek interbody cage was passed into the disc space under fluoroscopic guidance.  Anterior instrumentation was performed with globus titanium plate and fixation pins under fluoroscopic guidance and locking caps final tightened.  Final x-rays demonstrated good positioning of all hardware.  The wound was then thoroughly irrigated and hemostasis achieved with bipolar cautery and Gelfoam powder.  Steroid was placed over the esophagus.  Platysma was closed with 3-0 Vicryl sutures, the deep dermal layer with 3-0 Vicryl sutures and the skin was closed with a running subcuticular Monocryl.  Dermabond was placed over the wound.  There were no changes in neuromonitoring throughout the case                Assistant: Nupur Zamora CSA  was responsible for performing the following activities: Retraction, Suction, Irrigation, Suturing, Closing, and Placing Dressing and their skilled assistance was necessary for the success of this case.    Jim Lombardo IV, MD     Date: 12/28/2023  Time:  09:34 EST

## 2023-12-28 NOTE — ANESTHESIA PREPROCEDURE EVALUATION
Anesthesia Evaluation     Patient summary reviewed and Nursing notes reviewed   NPO Solid Status: > 8 hours  NPO Liquid Status: > 8 hours           Airway   Mallampati: II  TM distance: >3 FB  Neck ROM: limited  Dental - normal exam     Pulmonary - normal exam   (+) asthma,  (-) not a smoker  Cardiovascular - normal exam    ECG reviewed    (+) hyperlipidemia  (-) past MI, angina    ROS comment: Negative stress, normal EF and normal Valves in 2021 TTE    Neuro/Psych  GI/Hepatic/Renal/Endo    (+) obesity, thyroid problem hypothyroidism    Musculoskeletal     (+) neck pain  Abdominal    Substance History      OB/GYN          Other   arthritis,                   Anesthesia Plan    ASA 3     general and ERAS Protocol   total IV anesthesia  intravenous induction     Anesthetic plan, risks, benefits, and alternatives have been provided, discussed and informed consent has been obtained with: patient.    Use of blood products discussed with patient .    Plan discussed with CRNA and CAA.    CODE STATUS:

## 2023-12-28 NOTE — DISCHARGE SUMMARY
Date of Discharge:  12/28/2023    Discharge Diagnosis: Cervical spondylolisthesis    Presenting Problem/History of Present Illness  Active Hospital Problems    Diagnosis  POA    **Acquired spondylolisthesis of cervical vertebra [M43.12]  Unknown      Resolved Hospital Problems   No resolved problems to display.        Hospital Course  Patient is a 73 y.o. male presented with cervical spondylolisthesis and severe neck pain refractory to conservative measures.  He underwent C2-4 ACDF.  He was brought to the PACU following surgery.  Once he met PACU criteria he was discharged home.      Procedures Performed    Procedure(s):  Cervical 2-4 anterior cervical discectomy and fusion  -------------------       Consults:   Consults       No orders found from 11/29/2023 to 12/29/2023.            n  Home or Self Care    Discharge Medications     Discharge Medications        New Medications        Instructions Start Date   cyclobenzaprine 10 MG tablet  Commonly known as: FLEXERIL   10 mg, Oral, 3 Times Daily PRN      HYDROcodone-acetaminophen 5-325 MG per tablet  Commonly known as: Norco   1 tablet, Oral, Every 6 Hours PRN             Changes to Medications        Instructions Start Date   montelukast 10 MG tablet  Commonly known as: SINGULAIR  What changed: when to take this   TAKE 1 TABLET BY MOUTH EVERY DAY             Continue These Medications        Instructions Start Date   albuterol sulfate  (90 Base) MCG/ACT inhaler  Commonly known as: Proventil HFA   2 puffs, Inhalation, Every 4 Hours PRN      budesonide-formoterol 160-4.5 MCG/ACT inhaler  Commonly known as: Symbicort   2 puffs, Inhalation, 2 Times Daily      naproxen 500 MG tablet  Commonly known as: NAPROSYN   TAKE 1 TABLET BY MOUTH TWICE A DAY WITH MEALS      rosuvastatin 5 MG tablet  Commonly known as: CRESTOR   TAKE 1 TABLET BY MOUTH EVERY DAY      sildenafil 100 MG tablet  Commonly known as: VIAGRA   TAKE 1/2 - 1 TABLET BY MOUTH 1 HOUR PRIOR TO  INTERCOURSE             Stop These Medications      Aspirin Low Dose 81 MG EC tablet  Generic drug: aspirin              Discharge Diet: Soft advance to regular    Activity at Discharge: No bending or twisting neck    Follow-up Appointments  Future Appointments   Date Time Provider Department Center   1/9/2024 10:15 AM Jaden Chris, PA [facility]         Test Results Pending at Discharge       Jim Lombardo IV, MD  12/28/23  09:44 EST

## 2023-12-28 NOTE — INTERVAL H&P NOTE
H&P reviewed. The patient was examined and there are no changes to the H&P.  Patient was stands a risk of surgery as well as increased risk due to medical comorbidities including hyperlipidemia and asthma among other medical comorbidities and is agreed to undergo the procedure

## 2023-12-28 NOTE — ANESTHESIA POSTPROCEDURE EVALUATION
Patient: Jim Malik    Procedure Summary       Date: 12/28/23 Room / Location: [facility] OR 12 / [facility] MAIN OR    Anesthesia Start: 0727 Anesthesia Stop: 0937    Procedure: Cervical 2-4 anterior cervical discectomy and fusion (Spine Cervical) Diagnosis:       Acquired spondylolisthesis of cervical vertebra      (Acquired spondylolisthesis of cervical vertebra [M43.12])    Surgeons: Jim Lombardo IV, MD Provider: Emiliana Sosa MD    Anesthesia Type: general, ERAS Protocol ASA Status: 3            Anesthesia Type: general, ERAS Protocol    Vitals  Vitals Value Taken Time   /93 12/28/23 1040   Temp 97.8 °F (36.6 °C) 12/28/23 1040   Pulse 82 12/28/23 1044   Resp 15 12/28/23 1040   SpO2 92 % 12/28/23 1044   Vitals shown include unfiled device data.        Post Anesthesia Care and Evaluation    Patient location during evaluation: PACU  Patient participation: complete - patient participated  Level of consciousness: awake and alert  Pain management: satisfactory to patient    Airway patency: patent  Anesthetic complications: No anesthetic complications  PONV Status: none  Cardiovascular status: acceptable  Respiratory status: acceptable  Hydration status: acceptable

## 2023-12-28 NOTE — ANESTHESIA PROCEDURE NOTES
Airway  Urgency: elective    Date/Time: 12/28/2023 7:38 AM  Airway not difficult    General Information and Staff    Patient location during procedure: OR  Anesthesiologist: Emiliana Sosa MD  CRNA/CAA: Silvia Hearn CRNA    Indications and Patient Condition  Indications for airway management: airway protection    Preoxygenated: yes  MILS maintained throughout  Mask difficulty assessment: 1 - vent by mask    Final Airway Details  Final airway type: endotracheal airway      Successful airway: ETT  Cuffed: yes   Successful intubation technique: video laryngoscopy  Facilitating devices/methods: intubating stylet  Endotracheal tube insertion site: oral  Blade: Matt  Blade size: 4  ETT size (mm): 7.5  Cormack-Lehane Classification: grade I - full view of glottis  Placement verified by: chest auscultation and capnometry   Measured from: lips  ETT/EBT  to lips (cm): 23  Number of attempts at approach: 1  Assessment: lips, teeth, and gum same as pre-op and atraumatic intubation

## 2023-12-29 ENCOUNTER — TELEPHONE (OUTPATIENT)
Dept: NEUROSURGERY | Facility: CLINIC | Age: 73
End: 2023-12-29
Payer: MEDICARE

## 2023-12-29 RX ORDER — METHYLPREDNISOLONE 4 MG/1
TABLET ORAL
Qty: 1 EACH | Refills: 0 | Status: SHIPPED | OUTPATIENT
Start: 2023-12-29

## 2023-12-29 NOTE — TELEPHONE ENCOUNTER
Returned the patient's call and talked him through how to try to swallow better and that is to gently push his head/ neck forward and then swallowing, it often opens up his throat and can swallow better. Also suggested thicker things such as applesauce or pudding, mashed potatoes and taking very small bites. He has already started on the MDP and will call the office or go to the ER if it continues. I told him someone is always on call for our practice, he can just call our office number. He verbalized understanding.

## 2023-12-29 NOTE — TELEPHONE ENCOUNTER
PATIENT CALLED AND STATED THAT HE IS HAVING TROUBLE EATING OR DRINKING . HE STATED THAT BOTH LIQUID OR SOLID FOOD IS GOING DOWN BOTH TUBES AND HE IS CHOKING/ASPIRATING IT BACK UP AND NEEDS TO KNOW WHAT TO DO. PATIENT HAD SURGERY YESTERDAY ON HIS CERVICAL SPINE. I DID TELL HIM HE COULD GO TO THE ED IF HE FEELS LIKE HE NEEDS TO, BUT THAT I WILL SEND THIS TO THE CLINICAL POOL ASAP AND HE SHOULD GET A RESPONSE RIGHT AWAY. PLEASE ADVISE

## 2023-12-29 NOTE — TELEPHONE ENCOUNTER
PATIENT CALLED BACK AND SAID HE'S GOING TO GO GET THE MEDROL DOSEPACK, BUT STATES HE CAN'T EAT OR DRINK ANYTHING WIYHOUT GOING DOWN HIS TRACHIA . I TOLD HIM MAIN OUR /MEDICAL ASSISTANT WOULD CALL A DISCUSS THIS WITH HIM. HE UNDERSTOOD

## 2023-12-29 NOTE — TELEPHONE ENCOUNTER
Dr. Lombardo called and spoke with this patient as well. He wants him be NPO tonight and then try to eat or drink tomorrow and if the swallowing difficulty persists he should go to the ER tomorrow.

## 2024-01-03 NOTE — PROGRESS NOTES
Subjective     Chief Complaint   Patient presents with    Post-op     Follow up          Previous Treatment: Cervical 2-4 anterior cervical discectomy and fusion on 12/28/23. Hydrocodone,Flexeril,Naproxen,Medrol Dosepack    HPI: Jim Malik is a 73 y.o. male here for postoperative follow-up after undergoing C2-4 ACDF with Dr. Lombardo.  Patient reports his headaches have improved since surgery.  He does still have some general neck pain, but this is also somewhat improved since surgery.  Patient experienced some postoperative dysphagia.  This has improved mildly since surgery, but is still bothering him.  He is still able to eat, albeit slowly and purposefully.  Patient denies new weakness in his extremities, sensory deficits, fever, and chills.    He does report that he has experienced some episodic dizziness since surgery.  This primarily occurs when he goes from sitting to standing or looks upward.  He sometimes will get this when he first lays down as well.  He reports this sensation of lightheadedness as if he would pass out if it continue to worsen.  The symptoms spontaneously daryl after several seconds following position change.  The symptoms have not improved since surgery.  He does not have any associated vision loss, speech deficits, lateralizing weakness, or memory loss.        PMH:  Past Medical History:   Diagnosis Date    Allergic rhinitis     Arthritis     Asthma     DDD (degenerative disc disease), cervical     Fracture     Goiter     History of transfusion     Hyperlipidemia     Neck pain     Testicular hypofunction          Current Outpatient Medications:     albuterol sulfate HFA (Proventil HFA) 108 (90 Base) MCG/ACT inhaler, Inhale 2 puffs Every 4 (Four) Hours As Needed for Wheezing., Disp: 8 g, Rfl: 5    budesonide-formoterol (Symbicort) 160-4.5 MCG/ACT inhaler, Inhale 2 puffs 2 (Two) Times a Day., Disp: 90 each, Rfl: 3    cyclobenzaprine (FLEXERIL) 10 MG tablet, Take 1 tablet by mouth 3 (Three)  Times a Day As Needed for Muscle Spasms., Disp: 60 tablet, Rfl: 0    HYDROcodone-acetaminophen (Norco) 5-325 MG per tablet, Take 1 tablet by mouth Every 6 (Six) Hours As Needed for Moderate Pain., Disp: 26 tablet, Rfl: 0    montelukast (SINGULAIR) 10 MG tablet, TAKE 1 TABLET BY MOUTH EVERY DAY (Patient taking differently: Take 1 tablet by mouth Every Night.), Disp: 90 tablet, Rfl: 1    naproxen (NAPROSYN) 500 MG tablet, TAKE 1 TABLET BY MOUTH TWICE A DAY WITH MEALS, Disp: 60 tablet, Rfl: 5    rosuvastatin (CRESTOR) 5 MG tablet, TAKE 1 TABLET BY MOUTH EVERY DAY (Patient taking differently: Take 1 tablet by mouth Daily.), Disp: 90 tablet, Rfl: 1    sildenafil (VIAGRA) 100 MG tablet, TAKE 1/2 - 1 TABLET BY MOUTH 1 HOUR PRIOR TO INTERCOURSE, Disp: 12 tablet, Rfl: 7    methylPREDNISolone (MEDROL) 4 MG dose pack, Take as directed on package instructions. (Patient not taking: Reported on 1/9/2024), Disp: 1 each, Rfl: 0     No Known Allergies     Past Surgical History:   Procedure Laterality Date    ANTERIOR CERVICAL DISCECTOMY W/ FUSION N/A 12/28/2023    Procedure: Cervical 2-4 anterior cervical discectomy and fusion;  Surgeon: Jim Lombardo IV, MD;  Location: [facility] MAIN OR;  Service: Neurosurgery;  Laterality: N/A;    CARPAL TUNNEL RELEASE Left 2010    COLONOSCOPY      COLONOSCOPY N/A 10/11/2021    Procedure: COLONOSCOPY;  Surgeon: Radha Leon MD;  Location: [facility] ENDOSCOPY;  Service: Gastroenterology;  Laterality: N/A;    ENDOSCOPY      KNEE ARTHROSCOPY Right     SHOULDER ARTHROSCOPY Right     SKIN LESION EXCISION N/A 07/08/2019    Procedure: SKIN LESION EXCISION  Posterior scalp x2 and left lower extremity;  Surgeon: Stephen Zamora MD;  Location: [facility] MAIN OR;  Service: General    SKIN LESION EXCISION Left 07/15/2019    Procedure: SKIN LESION EXCISION;  Surgeon: Stephen Zamora MD;  Location: [facility] MAIN OR;  Service: General    SPINE SURGERY  1990    STABALATIONS    TONSILLECTOMY      UPPER ENDOSCOPIC  "ULTRASOUND W/ FNA N/A 12/30/2021    Procedure: ENDOSCOPIC ULTRASOUND WITH FINE NEEDLE ASPIRATION OF PANCREATIC CYST;  Surgeon: Jennifer Balderas MD;  Location: [facility] ENDOSCOPY;  Service: Gastroenterology;  Laterality: N/A;  Post: PANCREATIC CYST    UPPER ENDOSCOPIC ULTRASOUND W/ FNA N/A 11/18/2022    Procedure: ENDOSCOPIC ULTRASOUND aspiration of pancreatic cyst;  Surgeon: Jennifer Balderas MD;  Location: [facility] ENDOSCOPY;  Service: Gastroenterology;  Laterality: N/A;  pancreatic cyst        Family History   Problem Relation Age of Onset    No Known Problems Mother     Diabetes Brother     Hypertension Brother     Hyperlipidemia Brother          Social Hx:  Social History     Tobacco Use   Smoking Status Never   Smokeless Tobacco Never      Alcohol Use: Not At Risk (6/13/2019)    AUDIT-C     Frequency of Alcohol Consumption: Never     Average Number of Drinks: Not on file     Frequency of Binge Drinking: Not on file      Social History     Substance and Sexual Activity   Drug Use No          Review of Systems   Constitutional:  Positive for activity change.   HENT: Negative.     Eyes:  Positive for visual disturbance.   Respiratory: Negative.     Cardiovascular: Negative.    Gastrointestinal: Negative.    Endocrine: Negative.    Musculoskeletal:  Positive for neck stiffness (soreness).   Skin: Negative.    Allergic/Immunologic: Negative.    Neurological:  Positive for dizziness, syncope (once after surgery) and light-headedness.        Tingling in body   Hematological: Negative.    Psychiatric/Behavioral: Negative.           Objective     /84   Pulse 91   Ht 182.9 cm (72\")   Wt 105 kg (232 lb 3.2 oz)   BMI 31.49 kg/m²    Body mass index is 31.49 kg/m².      Physical Exam  Vitals reviewed.   Constitutional:       General: He is not in acute distress.     Appearance: Normal appearance.   Eyes:      General: Lids are normal.      Extraocular Movements: Extraocular movements intact.      Pupils: Pupils are equal, " round, and reactive to light.   Cardiovascular:      Rate and Rhythm: Normal rate and regular rhythm.      Pulses: Normal pulses.   Pulmonary:      Effort: Pulmonary effort is normal. No respiratory distress.   Musculoskeletal:         General: No swelling or tenderness. Normal range of motion.      Cervical back: Neck supple. No edema or erythema.      Right lower leg: No edema.      Left lower leg: No edema.   Skin:     General: Skin is warm and dry.      Findings: No bruising, erythema or rash.      Comments: Surgical incision CDI w/ no swelling redness or drainage   Neurological:      General: No focal deficit present.      Mental Status: He is oriented to person, place, and time.      Sensory: Sensation is intact.      Motor: Motor strength is normal.Motor function is intact. No weakness.        Neurological Exam  Mental Status  Awake, alert and oriented to person, place and time. Oriented to person, place, and time.    Cranial Nerves  CN II: Visual acuity is normal. Visual fields full to confrontation.  CN III, IV, VI: Extraocular movements intact bilaterally. Normal lids and orbits bilaterally. Pupils equal round and reactive to light bilaterally.  CN V: Facial sensation is normal.  CN VII: Full and symmetric facial movement.  CN IX, X: Palate elevates symmetrically. Normal gag reflex.  CN XI: Shoulder shrug strength is normal.  CN XII: Tongue midline without atrophy or fasciculations.    Motor  Normal muscle bulk throughout. Strength is 5/5 throughout all four extremities.    Sensory  Normal sensation.Sensation is intact to light touch, pinprick, vibration and proprioception in all four extremities.    Gait  Casual gait is normal including stance, stride, and arm swing.            Assessment & Plan     MDM: Jim Malik is a 73 y.o. male being seen for postoperative follow-up after undergoing C2-4 ACDF with Dr. Lombardo.  Patient is neurologically stable with no new deficits.  Incision is healing nicely with no  signs of infection or wound breakdown.  He reports some initial improvement of his preoperative headaches and neck pain, with expected levels of postoperative soreness and chronic pain still.  He does have some postoperative dysphagia which slows down his eating, but this is mildly improved since surgery with the help of a Medrol Dosepak.  Encourage patient that this should continue to improve, however if it does not, or if it gets worse that he should call our office.      Additionally, patient reports symptoms of dizziness and lightheadedness that started shortly after surgery. The symptoms are episodic, short-lived, and consistently associated with position change.  Symptoms spontaneously daryl shortly after completing his position change.  He has no associated symptoms such as vision loss, dysarthria, or lateralizing weakness. Symptoms are most consistent with orthostatic hypotension, though he may also be dealing with some positional vertigo.  It is odd that they began shortly after surgery, but it could be a side effect of 1 or more of his medications.  I recommend patient follow-up with his PCP and/or cardiologist for further evaluation.  I discussed patient's symptoms with Dr. Lombardo who agrees with this assessment and course of action.    At this point patient should begin outpatient PT and advance their level of activity w/ PT guidance. No lifting >30 lbs. They may shower but should refrain from submerging the incision underwater for the next 4 weeks. I will have them follow-up with Dr. Lombardo 3 months postoperatively with a new CT at that time.  Patient is agreeable to this plan.         Diagnosis Plan   1. S/P spinal fusion  Ambulatory Referral to Physical Therapy POST OP    CT Cervical Spine Without Contrast      2. Acquired spondylolisthesis of cervical vertebra  Ambulatory Referral to Physical Therapy POST OP    CT Cervical Spine Without Contrast      3. DDD (degenerative disc disease), cervical   Ambulatory Referral to Physical Therapy POST OP    CT Cervical Spine Without Contrast          Return in about 3 months (around 3/28/2024) for 3-month postoperative follow-up with Dr. Lombardo.                This patient was examined wearing appropriate personal protective equipment.            Jaden Chris PA-C    01/17/24  09:15 EST      Part of this note may be an electronic transcription/translation of spoken language to printed text using the Dragon Dictation System.

## 2024-01-05 ENCOUNTER — TELEPHONE (OUTPATIENT)
Dept: NEUROSURGERY | Facility: CLINIC | Age: 74
End: 2024-01-05
Payer: MEDICARE

## 2024-01-05 NOTE — TELEPHONE ENCOUNTER
Caller: PATIENT    Relationship: SELF    Best call back number: [phone]    What is the best time to reach you: ANYTIME    Who are you requesting to speak with (clinical staff, provider,  specific staff member): CLINICAL STAFF    Do you know the name of the person who called: NA    What was the call regarding: PATIENT CALLED AND STATES THAT HIS WORK SENT OVER PAPERWORK ON 01/02/24 FROM [facility]-PT IS WANTING TO MAKE SURE WE RECEIVED IT BY FAX-IF NOT PATIENT HAS A COPY OF THAT PAPERWORK HE CAN BRING WITH HIS ON HIS NEXT APPT. PT IS ASKING FOR A CALL BACK TO ADVISE THANK YOU    Is it okay if the provider responds through [employer]: NO

## 2024-01-09 ENCOUNTER — OFFICE VISIT (OUTPATIENT)
Dept: NEUROSURGERY | Facility: CLINIC | Age: 74
End: 2024-01-09
Payer: MEDICARE

## 2024-01-09 ENCOUNTER — TELEPHONE (OUTPATIENT)
Dept: NEUROSURGERY | Facility: CLINIC | Age: 74
End: 2024-01-09

## 2024-01-09 VITALS
DIASTOLIC BLOOD PRESSURE: 84 MMHG | BODY MASS INDEX: 31.45 KG/M2 | WEIGHT: 232.2 LBS | HEART RATE: 91 BPM | SYSTOLIC BLOOD PRESSURE: 138 MMHG | HEIGHT: 72 IN

## 2024-01-09 DIAGNOSIS — M43.12 ACQUIRED SPONDYLOLISTHESIS OF CERVICAL VERTEBRA: ICD-10-CM

## 2024-01-09 DIAGNOSIS — Z98.1 S/P SPINAL FUSION: Primary | ICD-10-CM

## 2024-01-09 DIAGNOSIS — M50.30 DDD (DEGENERATIVE DISC DISEASE), CERVICAL: ICD-10-CM

## 2024-01-09 PROCEDURE — 99024 POSTOP FOLLOW-UP VISIT: CPT

## 2024-01-09 NOTE — TELEPHONE ENCOUNTER
Called patient to inform him the dizziness and light headedness he is feeling is not due to the surgery that he has had. that it sound like orthostatic hypertension he needs to follow up with his pcp about this. He stated that the he contests to that and says the symptoms didn't start until after the surgery and that will follow up with cardio about it.

## 2024-01-22 ENCOUNTER — TREATMENT (OUTPATIENT)
Dept: PHYSICAL THERAPY | Facility: CLINIC | Age: 74
End: 2024-01-22
Payer: MEDICARE

## 2024-01-22 DIAGNOSIS — M54.2 PAIN, NECK: ICD-10-CM

## 2024-01-22 DIAGNOSIS — Z98.1 S/P SPINAL FUSION: Primary | ICD-10-CM

## 2024-01-22 DIAGNOSIS — H81.12 BPPV (BENIGN PAROXYSMAL POSITIONAL VERTIGO), LEFT: ICD-10-CM

## 2024-01-22 NOTE — PROGRESS NOTES
Physical Therapy Initial Evaluation and Plan of Care  [address] [place] [address] [place] [address]    Patient: Jim Malik   : 1950  Diagnosis/ICD-10 Code:  S/P spinal fusion [Z98.1]  Referring practitioner: DEMOND Rodriguez  Date of Initial Visit: 2024  Today's Date: 2024  Patient seen for 1 sessions           Subjective Questionnaire: PT Functional Test: NDI = 17/50, indicating 34% impairment      Subjective Evaluation    History of Present Illness  Mechanism of injury: Pt underwent C2-C4 ACDF on 2023. States his incision is not feeling too bad. Having pain and tightness in B UT. States he has been trying to keep his head still. Having pressure at back of neck when he lays on his back and takes a bit to find comfortable position when laying on his side. Sleeping in bed or recliner. Able to keep head straighter when in recliner. Denies any UE radicular symptoms. Denies any weakness B UE. Pt works full time and is currently off s/p surgery. Not currently taking any pain med or muscle relaxer. Has tried heat but did not get much relief. Pt wants to get back to working on his hot jessica which is up on a lift and pt needs to be able to look up when working. At full-time job, pt states his tool back weighs ~40#.    Pt's wife currently caring for her mom 3.5 hr away. Pt states he has driven there 2x since surgery. Having difficulty turning his head when driving.    Pt having dizziness with sit to supine since the day after surgery. States it feels like he is spinning when he lays down.          Patient Occupation: full time (3rd shift), general maintaince at [employer]   Precautions and Work Restrictions: No lifting >30#Quality of life: excellent    Pain  Current pain rating: 3  At best pain ratin  At worst pain ratin  Location: B UT  Quality: dull ache, discomfort and tight  Relieving factors: change in position  Aggravating factors: lifting, movement, sleeping, prolonged positioning and  overhead activity  Progression: no change    Social Support  Lives with: spouse (wife currently in [place] caring for her mother)    Hand dominance: right    Patient Goals  Patient goals for therapy: decreased pain, increased motion, increased strength, independence with ADLs/IADLs, return to work and return to sport/leisure activities  Patient goal: working on/building hot jessica           Objective          Active Range of Motion   Cervical/Thoracic Spine   Cervical    Flexion: 24 degrees WFL  Extension: 30 degrees with pain  Left rotation: 42 degrees   Right rotation: 40 degrees     Strength/Myotome Testing     Left Shoulder     Planes of Motion   Flexion: 4+   Abduction: 4+   External rotation at 0°: 4+   Internal rotation at 0°: 5     Right Shoulder     Planes of Motion   Flexion: 5   Abduction: 5   External rotation at 0°: 5   Internal rotation at 0°: 5     Left Elbow   Flexion: 5  Extension: 5    Right Elbow   Flexion: 5  Extension: 5    Left Wrist/Hand   Wrist extension: 5  Wrist flexion: 5    Right Wrist/Hand   Wrist extension: 5  Wrist flexion: 5      Sharon Hallpike = L positive with nystagmus, R negative  Performed L Epley    Assessment & Plan       Assessment  Impairments: abnormal muscle firing, abnormal muscle tone, abnormal or restricted ROM, activity intolerance, impaired physical strength, lacks appropriate home exercise program and pain with function   Functional limitations: carrying objects, lifting, sleeping, pulling, pushing, uncomfortable because of pain, moving in bed, reaching behind back, reaching overhead and unable to perform repetitive tasks   Assessment details: Pt is 72 yo male ~3.5 wk s/p C2-C4 ACDF with c/o B UT pain and tightness. Pt is having difficulty turning his head when driving. Difficulty sleeping at night due to unable to get comfortable. Difficulty with pain. Pt also with c/o dizziness with positional changes. Pt with positive L BPPV. NDI indicates 34% impairment.    Patient  presents with the impairments listed above and based on the objective findings and the physical therapy evaluation, the patient's condition has the potential to improve in response to therapy.   The patient's condition and/or services required are at a level of complexity that necessitates the skill & supervision of a physical therapist.    Prognosis: good    Goals  Plan Goals: STG to be met in 3 wk:  - Pt to report 50% improvement in B UT tightness.  - Increase B cervical rotation to 45 deg.  - Pt to demonstrated improved posture with min verbal cues.  LTG to be met in 12 wk:  - Improve NDI to 15% or less impairment.  - Increase B cervical rotation to 55 deg for improved ease turning his head while driving.  - Pt to report 75% improvement in sleep in regards to neck and B UT tightness.  - Pt to be independent with HEP.    Plan  Therapy options: will be seen for skilled therapy services  Planned modality interventions: thermotherapy (hydrocollator packs), electrical stimulation/Russian stimulation and dry needling  Other planned modality interventions: modalities as indicated  Planned therapy interventions: body mechanics training, flexibility, home exercise program, functional ROM exercises, manual therapy, postural training, soft tissue mobilization, strengthening, stretching, therapeutic activities and neuromuscular re-education  Frequency: 2x week  Duration in weeks: 12  Treatment plan discussed with: patient        Timed:         Manual Therapy:    15     mins  00237;     Therapeutic Exercise:    10     mins  18330;     Neuromuscular Jody:        mins  72031;    Therapeutic Activity:          mins  99389;     Gait Training:           mins  17791;     Ultrasound:          mins  60249;    Ionto                                   mins   29242  Self - Care                          mins  80508    Un-Timed:  Electrical Stimulation:         mins  62942 ( );  Dry Needling          20561/20560  Traction           mins 37655  Can Repos     10     mins 39841  Low Eval          Mins  52035  Mod Eval     25     Mins  30282  High Eval                            Mins  85212      Timed Treatment:   25   mins   Total Treatment:     60   mins      PT SIGNATURE: Catrina Jain PT, CLT  [place] Lic # [id number]  Electronically signed by Catrina Jain PT, 01/22/24, 8:07 AM EST    Medicare Initial Certification  Certification Period: 1/22/2024 thru 4/20/2024  I certify that the therapy services are furnished while this patient is under my care.  The services outlined above are required by this patient, and will be reviewed every 90 days.     PHYSICIAN: Jaden Chris PA _____________________________________________________  NPI: [id number]                                      DATE:    Please sign and return via fax to [phone]. Thank you, [facility].

## 2024-01-25 ENCOUNTER — TREATMENT (OUTPATIENT)
Dept: PHYSICAL THERAPY | Facility: CLINIC | Age: 74
End: 2024-01-25
Payer: MEDICARE

## 2024-01-25 DIAGNOSIS — H81.12 BPPV (BENIGN PAROXYSMAL POSITIONAL VERTIGO), LEFT: ICD-10-CM

## 2024-01-25 DIAGNOSIS — M54.2 PAIN, NECK: ICD-10-CM

## 2024-01-25 DIAGNOSIS — Z98.1 S/P SPINAL FUSION: Primary | ICD-10-CM

## 2024-01-25 PROCEDURE — 97140 MANUAL THERAPY 1/> REGIONS: CPT | Performed by: PHYSICAL THERAPIST

## 2024-01-25 PROCEDURE — G0283 ELEC STIM OTHER THAN WOUND: HCPCS | Performed by: PHYSICAL THERAPIST

## 2024-01-25 PROCEDURE — 97110 THERAPEUTIC EXERCISES: CPT | Performed by: PHYSICAL THERAPIST

## 2024-01-25 NOTE — PROGRESS NOTES
Physical Therapy Daily Treatment Note      Patient: Jim Malik   : 1950  Diagnosis/ICD-10 Code:  S/P spinal fusion [Z98.1]  Referring practitioner: DEMOND Rodriguez  Date of Initial Visit: Type: THERAPY  Noted: 2024  Today's Date: 2024  Patient seen for 2 sessions         Jim Malik reports: it feels good to stretch and have his neck massaged but states everything he manages to get loose tightens back up severely by the next day. Pt. States it has just been a lot of tension in his neck and upper back.    Objective   See Exercise, Manual, and Modality Logs for complete treatment.     Assessment/Plan  Pt. Tolerates treatment well this visit and continues to benefit from progression cervical ROM to encourages stretch and mobility. Pt. Has apparent trigger point throughout trapezius musculature and paraspinal musculature alike. Pt. Was shown good scap mechanics and activities to improve overall posture and muscular reinforcement.     Goals  Plan Goals: STG to be met in 3 wk:  - Pt to report 50% improvement in B UT tightness.  - Increase B cervical rotation to 45 deg.  - Pt to demonstrated improved posture with min verbal cues.  LTG to be met in 12 wk:  - Improve NDI to 15% or less impairment.  - Increase B cervical rotation to 55 deg for improved ease turning his head while driving.  - Pt to report 75% improvement in sleep in regards to neck and B UT tightness.  - Pt to be independent with HEP.    Progress strengthening /stabilization /functional activity           Timed:         Manual Therapy:    15     mins  88948;     Therapeutic Exercise:    15     mins  34456;     Un-Timed:  Electrical Stimulation:    15     mins  72227 ( );        Timed Treatment:   30   mins   Total Treatment:     45   mins    Madeline Quinonez PTA  Physical Therapist Assistant License #[id number]

## 2024-01-29 ENCOUNTER — TREATMENT (OUTPATIENT)
Dept: PHYSICAL THERAPY | Facility: CLINIC | Age: 74
End: 2024-01-29
Payer: MEDICARE

## 2024-01-29 DIAGNOSIS — M54.2 PAIN, NECK: ICD-10-CM

## 2024-01-29 DIAGNOSIS — H81.12 BPPV (BENIGN PAROXYSMAL POSITIONAL VERTIGO), LEFT: ICD-10-CM

## 2024-01-29 DIAGNOSIS — Z98.1 S/P SPINAL FUSION: Primary | ICD-10-CM

## 2024-01-29 PROCEDURE — 97140 MANUAL THERAPY 1/> REGIONS: CPT | Performed by: PHYSICAL THERAPIST

## 2024-01-29 PROCEDURE — G0283 ELEC STIM OTHER THAN WOUND: HCPCS | Performed by: PHYSICAL THERAPIST

## 2024-01-29 PROCEDURE — 97110 THERAPEUTIC EXERCISES: CPT | Performed by: PHYSICAL THERAPIST

## 2024-01-29 NOTE — PROGRESS NOTES
Physical Therapy Daily Treatment Note      Patient: Jim Malik   : 1950  Diagnosis/ICD-10 Code:  S/P spinal fusion [Z98.1]  Referring practitioner: DEMOND Rodriguez  Date of Initial Visit: Type: THERAPY  Noted: 2024  Today's Date: 2024  Patient seen for 3 sessions         Jim Malik reports: the same as last time the exercises are going well and the stiffness is able to be worked out but he has stiffness in his neck and shoulders return within a very brief period.    Objective   See Exercise, Manual, and Modality Logs for complete treatment.     Assessment/Plan  Pt. Tolerates treatment well this date and techniques were repeated with good form and response this date next progression will be focused on progressing scap strengthening and cervical mechanics as well.    Goals  Plan Goals: STG to be met in 3 wk:  - Pt to report 50% improvement in B UT tightness.  - Increase B cervical rotation to 45 deg.  - Pt to demonstrated improved posture with min verbal cues.  LTG to be met in 12 wk:  - Improve NDI to 15% or less impairment.  - Increase B cervical rotation to 55 deg for improved ease turning his head while driving.  - Pt to report 75% improvement in sleep in regards to neck and B UT tightness.  - Pt to be independent with HEP.    Progress strengthening /stabilization /functional activity           Timed:         Manual Therapy:    15     mins  27398;     Therapeutic Exercise:    15     mins  30941;       Un-Timed:  Electrical Stimulation:    15     mins  39840 ( );    Timed Treatment:   30   mins   Total Treatment:     45   mins    Madeline Quinonez PTA  Physical Therapist Assistant License #[id number]

## 2024-02-01 ENCOUNTER — TREATMENT (OUTPATIENT)
Dept: PHYSICAL THERAPY | Facility: CLINIC | Age: 74
End: 2024-02-01
Payer: MEDICARE

## 2024-02-01 DIAGNOSIS — H81.12 BPPV (BENIGN PAROXYSMAL POSITIONAL VERTIGO), LEFT: ICD-10-CM

## 2024-02-01 DIAGNOSIS — Z98.1 S/P SPINAL FUSION: Primary | ICD-10-CM

## 2024-02-01 DIAGNOSIS — M54.2 PAIN, NECK: ICD-10-CM

## 2024-02-01 PROCEDURE — 97110 THERAPEUTIC EXERCISES: CPT | Performed by: PHYSICAL THERAPIST

## 2024-02-01 PROCEDURE — 97140 MANUAL THERAPY 1/> REGIONS: CPT | Performed by: PHYSICAL THERAPIST

## 2024-02-01 PROCEDURE — G0283 ELEC STIM OTHER THAN WOUND: HCPCS | Performed by: PHYSICAL THERAPIST

## 2024-02-01 NOTE — PROGRESS NOTES
Physical Therapy Daily Treatment Note      Patient: Jim Malik   : 1950  Diagnosis/ICD-10 Code:  S/P spinal fusion [Z98.1]  Referring practitioner: DEMOND Rodriguez  Date of Initial Visit: Type: THERAPY  Noted: 2024  Today's Date: 2024  Patient seen for 4 sessions         Jim Malik reports: he has the same response each visit thus far having tension remain better after stretching and while moving but after brief rest has tightness and tension return in neck and B UT's.    Objective   See Exercise, Manual, and Modality Logs for complete treatment.     Assessment/Plan  Pt. Tolerates exercise and stretch well this date and dry needling was discussed as an option to relieve tension that returns and pt. Would like to try it next session.    Goals  Plan Goals: STG to be met in 3 wk:  - Pt to report 50% improvement in B UT tightness.  - Increase B cervical rotation to 45 deg.  - Pt to demonstrated improved posture with min verbal cues.  LTG to be met in 12 wk:  - Improve NDI to 15% or less impairment.  - Increase B cervical rotation to 55 deg for improved ease turning his head while driving.  - Pt to report 75% improvement in sleep in regards to neck and B UT tightness.  - Pt to be independent with HEP.    Progress strengthening /stabilization /functional activity           Timed:         Manual Therapy:    15     mins  40827;     Therapeutic Exercise:    15     mins  57084;     Un-Timed:  Electrical Stimulation:    15     mins  24702 ( );        Timed Treatment:   30   mins   Total Treatment:     45   mins    Madeline Quinonez PTA  Physical Therapist Assistant License #[id number]

## 2024-02-05 ENCOUNTER — TREATMENT (OUTPATIENT)
Dept: PHYSICAL THERAPY | Facility: CLINIC | Age: 74
End: 2024-02-05
Payer: MEDICARE

## 2024-02-05 DIAGNOSIS — Z98.1 S/P SPINAL FUSION: Primary | ICD-10-CM

## 2024-02-05 DIAGNOSIS — M54.2 PAIN, NECK: ICD-10-CM

## 2024-02-05 PROCEDURE — 97110 THERAPEUTIC EXERCISES: CPT | Performed by: PHYSICAL THERAPIST

## 2024-02-05 PROCEDURE — 97140 MANUAL THERAPY 1/> REGIONS: CPT | Performed by: PHYSICAL THERAPIST

## 2024-02-05 PROCEDURE — G0283 ELEC STIM OTHER THAN WOUND: HCPCS | Performed by: PHYSICAL THERAPIST

## 2024-02-05 PROCEDURE — DRYNDLTRIAL DRY NEEDLING TRIAL: Performed by: PHYSICAL THERAPIST

## 2024-02-05 NOTE — PROGRESS NOTES
Physical Therapy Daily Treatment Note      Patient: Jim Malik   : 1950  Diagnosis/ICD-10 Code:  S/P spinal fusion [Z98.1]   Problems Addressed this Visit    None  Visit Diagnoses       S/P spinal fusion    -  Primary    Pain, neck              Diagnoses         Codes Comments    S/P spinal fusion    -  Primary ICD-10-CM: Z98.1  ICD-9-CM: V45.4     Pain, neck     ICD-10-CM: M54.2  ICD-9-CM: 723.1           Referring practitioner: DEMOND Rodriguez  Date of Initial Visit: Type: THERAPY  Noted: 2024  Today's Date: 2024    VISIT#: 5    Subjective : Pt states he had a very rough weekend with pain in neck and out to B shoulders. Wanting to do dry needling today. Reports that over the weekend when he was chewing, he got an ache at side of neck and down side of arm. This happened only at one meal over the weekend.       Objective : Dry needling completed this date with written consent to the following areas: B suboccipitals, B cervical paraspinals, B infraspinatus, B scapular fossa, B UT in prone, anterior aspect B UT in supine, and proximal insertion of B SCM.    Applied Kinesiotape to B UT in stretched position. Instructed pt on safe removal and to remove if he experiences any skin irritation. Pt verbalized good understanding.  Instructed pt in doorway pec stretch with good return demonstration.    See Exercise, Manual, and Modality Logs for complete treatment.     Assessment/Plan : Continued persistent pain and tightness in B UT and intermittent R UT radicular symptoms. Good tolerance to initial dry needling. Pt will benefit from dry needling next week also to decrease increased tone and decrease pain. Pt responded well to manual techniques with slight decrease in pain reported post-tx. Most ther ex held this date due to time constraints. Will resume ther ex next visit.     Goals  Plan Goals: STG to be met in 3 wk:  - Pt to report 50% improvement in B UT tightness.  - Increase B cervical rotation to  45 deg.  - Pt to demonstrated improved posture with min verbal cues.  LTG to be met in 12 wk:  - Improve NDI to 15% or less impairment.  - Increase B cervical rotation to 55 deg for improved ease turning his head while driving.  - Pt to report 75% improvement in sleep in regards to neck and B UT tightness.  - Pt to be independent with HEP.    Progress per Plan of Care and Progress strengthening /stabilization /functional activity         Timed:         Manual Therapy:    20     mins  34911;     Therapeutic Exercise:    8     mins  35231;     Neuromuscular Jody:        mins  33181;    Therapeutic Activity:          mins  49618;     Gait Training:           mins  06190;     Ultrasound:          mins  06754;    Ionto                                   mins   61121  Self Care                            mins   15274    Un-Timed:  Electrical Stimulation:    15     mins  47031 ( );  Dry Needling     15     mins 45526/45010  Traction          mins 41725  Canalith Repos                   mins  10924  Low Eval          Mins  68508  Mod Eval          Mins  71759  High Eval                            Mins  52141  Re-Eval                               mins  62677    Timed Treatment:   28   mins   Total Treatment:     58   mins    Catrina Jain, PT, CLT, Cert DN  Physical Therapist  [place] Lic # [id number]

## 2024-02-15 ENCOUNTER — TREATMENT (OUTPATIENT)
Dept: PHYSICAL THERAPY | Facility: CLINIC | Age: 74
End: 2024-02-15
Payer: MEDICARE

## 2024-02-15 DIAGNOSIS — M54.2 PAIN, NECK: ICD-10-CM

## 2024-02-15 DIAGNOSIS — Z98.1 S/P SPINAL FUSION: Primary | ICD-10-CM

## 2024-02-15 DIAGNOSIS — H81.12 BPPV (BENIGN PAROXYSMAL POSITIONAL VERTIGO), LEFT: ICD-10-CM

## 2024-02-19 ENCOUNTER — TREATMENT (OUTPATIENT)
Dept: PHYSICAL THERAPY | Facility: CLINIC | Age: 74
End: 2024-02-19
Payer: MEDICARE

## 2024-02-19 DIAGNOSIS — H81.12 BPPV (BENIGN PAROXYSMAL POSITIONAL VERTIGO), LEFT: ICD-10-CM

## 2024-02-19 DIAGNOSIS — Z98.1 S/P SPINAL FUSION: Primary | ICD-10-CM

## 2024-02-19 DIAGNOSIS — M54.2 PAIN, NECK: ICD-10-CM

## 2024-02-19 PROCEDURE — 97110 THERAPEUTIC EXERCISES: CPT | Performed by: PHYSICAL THERAPIST

## 2024-02-19 PROCEDURE — 97140 MANUAL THERAPY 1/> REGIONS: CPT | Performed by: PHYSICAL THERAPIST

## 2024-02-19 PROCEDURE — G0283 ELEC STIM OTHER THAN WOUND: HCPCS | Performed by: PHYSICAL THERAPIST

## 2024-02-19 NOTE — PROGRESS NOTES
Physical Therapy Daily Treatment Note      Patient: Jim Malik   : 1950  Diagnosis/ICD-10 Code:  S/P spinal fusion [Z98.1]  Referring practitioner: DEMOND Rodriguez  Date of Initial Visit: Type: THERAPY  Noted: 2024  Today's Date: 2024  Patient seen for 7 sessions         Jim Malik reports: he is doing well this date and states over the weekend he didn't do a whole lot mostly cared for a sick dog and remained home bound due to the cold temperatures. Pt. States at the end of his session that he really felt his neck mobility particularly rotation has significantly improved since starting therapy.    Objective   See Exercise, Manual, and Modality Logs for complete treatment.     Assessment/Plan  Pt. Tolerates treatment well and responds well to TPR in B UT's some grimacing is noted throughout but ultimately good response from Pt. Pt. continues to benefit form Estim for relaxation and appears more confident and capable with cervical mobility at this time current exercises cause no pain and continue to be progressed.    Goals  Plan Goals: STG to be met in 3 wk:  - Pt to report 50% improvement in B UT tightness.  - Increase B cervical rotation to 45 deg.  - Pt to demonstrated improved posture with min verbal cues.  LTG to be met in 12 wk:  - Improve NDI to 15% or less impairment.  - Increase B cervical rotation to 55 deg for improved ease turning his head while driving.  - Pt to report 75% improvement in sleep in regards to neck and B UT tightness.  - Pt to be independent with HEP.    Progress strengthening /stabilization /functional activity           Timed:         Manual Therapy:    15     mins  61480;     Therapeutic Exercise:    15     mins  09888;       Electrical Stimulation:    15     mins  21353 ( );     Timed Treatment:   30   mins   Total Treatment:     30   mins    Madeline Quinonez PTA  Physical Therapist Assistant License #[id number]

## 2024-02-20 DIAGNOSIS — Z98.1 S/P SPINAL FUSION: ICD-10-CM

## 2024-02-20 DIAGNOSIS — M43.12 ACQUIRED SPONDYLOLISTHESIS OF CERVICAL VERTEBRA: ICD-10-CM

## 2024-02-20 DIAGNOSIS — M50.30 DDD (DEGENERATIVE DISC DISEASE), CERVICAL: ICD-10-CM

## 2024-02-22 ENCOUNTER — TREATMENT (OUTPATIENT)
Dept: PHYSICAL THERAPY | Facility: CLINIC | Age: 74
End: 2024-02-22
Payer: MEDICARE

## 2024-02-22 DIAGNOSIS — Z98.1 S/P SPINAL FUSION: Primary | ICD-10-CM

## 2024-02-22 DIAGNOSIS — M54.2 PAIN, NECK: ICD-10-CM

## 2024-02-22 DIAGNOSIS — H81.12 BPPV (BENIGN PAROXYSMAL POSITIONAL VERTIGO), LEFT: ICD-10-CM

## 2024-02-22 PROCEDURE — 97110 THERAPEUTIC EXERCISES: CPT | Performed by: PHYSICAL THERAPIST

## 2024-02-22 PROCEDURE — 97140 MANUAL THERAPY 1/> REGIONS: CPT | Performed by: PHYSICAL THERAPIST

## 2024-02-22 PROCEDURE — G0283 ELEC STIM OTHER THAN WOUND: HCPCS | Performed by: PHYSICAL THERAPIST

## 2024-02-22 NOTE — PROGRESS NOTES
Physical Therapy Daily Treatment Note      Patient: Jim Malik   : 1950  Diagnosis/ICD-10 Code:  S/P spinal fusion [Z98.1]  Referring practitioner: DEMOND Rodriguez  Date of Initial Visit: Type: THERAPY  Noted: 2024  Today's Date: 2024  Patient seen for 8 sessions         Jim Malik reports: he is doing a little worse today and states he continues to be tight in B UT's and lats night he barley slept at all turning over multiple times because the base of his head his neck were so sore with no position providing relief.    Objective   See Exercise, Manual, and Modality Logs for complete treatment.     Assessment/Plan  Pt. Tolerates treatment well this date responding best to SOR and gentle UT stretching to release tense muscles in neck and upper back. Pt. Completes TB activities without reports of pain or discomfort.    Goals  Plan Goals: STG to be met in 3 wk:  - Pt to report 50% improvement in B UT tightness.  - Increase B cervical rotation to 45 deg.  - Pt to demonstrated improved posture with min verbal cues.  LTG to be met in 12 wk:  - Improve NDI to 15% or less impairment.  - Increase B cervical rotation to 55 deg for improved ease turning his head while driving.  - Pt to report 75% improvement in sleep in regards to neck and B UT tightness.  - Pt to be independent with HEP.    Progress strengthening /stabilization /functional activity           Timed:         Manual Therapy:    15     mins  01538;     Therapeutic Exercise:    15     mins  63588;       Un-Timed:  Electrical Stimulation:    15     mins  89015 ( );        Timed Treatment:   30   mins   Total Treatment:     45   mins    Madeline Quinonez PTA  Physical Therapist Assistant License #[id number]

## 2024-02-23 NOTE — PROGRESS NOTES
"Subjective   History of Present Illness: Jim Malik is a 74 y.o. male is here today for surgical follow-up with a new CT. his neck pain and headaches are significantly better following surgery.  Unfortunately he continues to have swallowing issues    Chief Complaint   Patient presents with    Post-op     Sx on 12/28/23  New CT           Previous treatment: Physical therapy post-op Hydrocodone, Flexeril,Naproxen,Medrol Dosepack     Previous neurosurgery:  Cervical 2-4 anterior cervical discectomy and fusion on 12/28/23.      Previous injections:     The following portions of the patient's history were reviewed and updated as appropriate: allergies, current medications, past family history, past medical history, past social history, past surgical history, and problem list.    Review of Systems   Constitutional:  Positive for activity change.   HENT: Negative.     Eyes: Negative.    Respiratory:  Positive for cough, choking, shortness of breath and wheezing.    Cardiovascular: Negative.    Gastrointestinal: Negative.    Endocrine: Negative.    Genitourinary: Negative.    Musculoskeletal: Negative.    Skin: Negative.    Allergic/Immunologic: Negative.    Neurological: Negative.    Hematological: Negative.    Psychiatric/Behavioral: Negative.         Objective      /87   Pulse 80   Resp 18   Ht 182.9 cm (72\")   Wt 103 kg (226 lb)   SpO2 98%   BMI 30.65 kg/m²    Body mass index is 30.65 kg/m².  Vitals:    02/28/24 0911   PainSc: 0-No pain           Neurologic Exam    Assessment & Plan   Independent Review of Radiographic Studies:      I personally reviewed and interpreted the images from the following studies.    CT cervical spine: Hardware intact and in good positioning with evidence of developing fusion across the disc space    Medical Decision Making:      Jim Malik is a 74 y.o. male status post C2-4 ACDF now 2 months postop overall doing well in terms of his preoperative symptoms.  He should continue " working with physical therapy doing home exercises.  I would not expect to release him to full duty for work until a minimum of 6 months postop.  In terms of his swallowing issues we will refer him to speech.  I reassured him that swallowing generally does get better.  Fortunately he does not have any hoarseness which would be indicative of recurrent laryngeal nerve damage.  I will see him back in 3 months to evaluate his progress      Diagnoses and all orders for this visit:    1. S/P spinal fusion (Primary)      No follow-ups on file.    This patient was examined wearing appropriate personal protective equipment.                      Dr. Jim Lombardo IV    02/28/24  09:40 EST

## 2024-02-28 ENCOUNTER — OFFICE VISIT (OUTPATIENT)
Dept: NEUROSURGERY | Facility: CLINIC | Age: 74
End: 2024-02-28
Payer: MEDICARE

## 2024-02-28 VITALS
HEIGHT: 72 IN | DIASTOLIC BLOOD PRESSURE: 87 MMHG | WEIGHT: 226 LBS | RESPIRATION RATE: 18 BRPM | SYSTOLIC BLOOD PRESSURE: 131 MMHG | BODY MASS INDEX: 30.61 KG/M2 | HEART RATE: 80 BPM | OXYGEN SATURATION: 98 %

## 2024-02-28 DIAGNOSIS — Z98.1 S/P SPINAL FUSION: Primary | ICD-10-CM

## 2024-02-28 DIAGNOSIS — R13.10 DYSPHAGIA, UNSPECIFIED TYPE: ICD-10-CM

## 2024-02-28 PROCEDURE — 99024 POSTOP FOLLOW-UP VISIT: CPT | Performed by: NEUROLOGICAL SURGERY

## 2024-02-28 PROCEDURE — 1160F RVW MEDS BY RX/DR IN RCRD: CPT | Performed by: NEUROLOGICAL SURGERY

## 2024-02-28 PROCEDURE — 1159F MED LIST DOCD IN RCRD: CPT | Performed by: NEUROLOGICAL SURGERY

## 2024-02-29 ENCOUNTER — TREATMENT (OUTPATIENT)
Dept: PHYSICAL THERAPY | Facility: CLINIC | Age: 74
End: 2024-02-29
Payer: MEDICARE

## 2024-02-29 DIAGNOSIS — M54.2 PAIN, NECK: ICD-10-CM

## 2024-02-29 DIAGNOSIS — Z98.1 S/P SPINAL FUSION: Primary | ICD-10-CM

## 2024-02-29 NOTE — PROGRESS NOTES
Physical Therapy Progress Note/Reassessment  [address] [place] [address] , [address], [place] [address]    Patient: Jim Malik   : 1950  Diagnosis/ICD-10 Code:  S/P spinal fusion [Z98.1]  Referring practitioner: DEMOND Rodriguez  Date of Initial Evaluation:  Type: THERAPY  Noted: 2024  Patient seen for 9 sessions      Subjective:   Visit Diagnoses:    ICD-10-CM ICD-9-CM   1. S/P spinal fusion  Z98.1 V45.4   2. Pain, neck  M54.2 723.1         Jim Malik reports his neck is feeling stiff today. Wants to do dry needling today. Pt saw MD yesterday and goes back in May. Was told he could back to work limited duty but that is not an option as there is no light duty at work. 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 Pt asking if he can start lifting weights at home.  Subjective Questionnaire: NDI:, indicating 24% impairment  Clinical Progress: improved  Home Program Compliance: Yes  Treatment has included: therapeutic exercise, neuromuscular re-education, manual therapy, therapeutic activity, electrical stimulation, dry needling, and moist heat      Objective : Dry needling completed this date with written consent to the following areas: B suboccipitals, B cervical paraspinals, B infraspinatus, B scapular fossa, B UT in prone, and anterior aspect B UT in supine.    Cervical rotation: R = 45 deg, L = 47 deg    Initiated B shoulder strengthening with free weights this date. Pt encouraged to add these to HEP. He verbalized good understanding and declined copy of exercises.    See Exercise, Manual, and Modality Logs for complete treatment.     Assessment/Plan : Pt has made gains in cervical rotation ROM. Progressed ther ex this date with good tolerance and ability. Plan to add overhead press next visit. NDI has improved from 34% impairment to 24% impairment. Pt will benefit from continued B shoulder, scapular, and core strengthening for successful return to work in the near future.    Goals  Plan  Goals: STG to be met in 3 wk:  - Pt to report 50% improvement in B UT tightness. -   - Increase B cervical rotation to 45 deg. - MET  - Pt to demonstrated improved posture with min verbal cues. - MET  LTG to be met in 12 wk:  - Improve NDI to 15% or less impairment. - Not met, currently at 24% impairment  - Increase B cervical rotation to 55 deg for improved ease turning his head while driving. - Progressing  - Pt to report 75% improvement in sleep in regards to neck and B UT tightness. - Not met  - Pt to be independent with HEP. - Progressing     Recommendations: Continue as planned  Timeframe: 2 months  Prognosis to achieve goals: good      Timed:         Manual Therapy:    15     mins  40015;     Therapeutic Exercise:    10     mins  12135;     Neuromuscular Jody:        mins  73541;    Therapeutic Activity:          mins  29922;     Gait Training:           mins  36734;     Ultrasound:          mins  79607;    Ionto                                   mins   98080  Self Care                            mins   88315      Un-Timed:  Electrical Stimulation:    15     mins  30987 ( );  Dry Needling     15     mins self-pay  Traction          mins 69307  Canalith Repos         mins 98298      Timed Treatment:   25   mins   Total Treatment:     55   mins    PT Signature: Catrina Jain, PT, CLT  PT License: [place] Lic # [id number]

## 2024-03-04 ENCOUNTER — TREATMENT (OUTPATIENT)
Dept: PHYSICAL THERAPY | Facility: CLINIC | Age: 74
End: 2024-03-04
Payer: MEDICARE

## 2024-03-04 DIAGNOSIS — Z98.1 S/P SPINAL FUSION: Primary | ICD-10-CM

## 2024-03-04 DIAGNOSIS — M54.2 PAIN, NECK: ICD-10-CM

## 2024-03-04 DIAGNOSIS — H81.12 BPPV (BENIGN PAROXYSMAL POSITIONAL VERTIGO), LEFT: ICD-10-CM

## 2024-03-04 PROCEDURE — G0283 ELEC STIM OTHER THAN WOUND: HCPCS | Performed by: PHYSICAL THERAPIST

## 2024-03-04 PROCEDURE — 97140 MANUAL THERAPY 1/> REGIONS: CPT | Performed by: PHYSICAL THERAPIST

## 2024-03-04 PROCEDURE — 97110 THERAPEUTIC EXERCISES: CPT | Performed by: PHYSICAL THERAPIST

## 2024-03-04 PROCEDURE — 97112 NEUROMUSCULAR REEDUCATION: CPT | Performed by: PHYSICAL THERAPIST

## 2024-03-04 NOTE — PROGRESS NOTES
Physical Therapy Daily Treatment Note      Patient: Jim Malik   : 1950  Diagnosis/ICD-10 Code:  S/P spinal fusion [Z98.1]  Referring practitioner: DEMOND Rodriguez  Date of Initial Visit: Type: THERAPY  Noted: 2024  Today's Date: 3/4/2024  Patient seen for 10 sessions         Jim Malik reports: he is doing better at this point but still tends to tense up daily so he intends to begin gradually returning to his garage and home work and take what he can get out of that and hope it builds him up for when he returns to work.    Objective   See Exercise, Manual, and Modality Logs for complete treatment.     Assessment/Plan  Pt. Tolerates treatment well especially benefiting form increase of weight to 5 lbs and enhancing form with exercise. Pt. Is doing well with progression however remains very tense in B UT's att this time showing good response to manual treatment and Estim for relief.    Goals  Plan Goals: STG to be met in 3 wk:  - Pt to report 50% improvement in B UT tightness. -   - Increase B cervical rotation to 45 deg. - MET  - Pt to demonstrated improved posture with min verbal cues. - MET  LTG to be met in 12 wk:  - Improve NDI to 15% or less impairment. - Not met, currently at 24% impairment  - Increase B cervical rotation to 55 deg for improved ease turning his head while driving. - Progressing  - Pt to report 75% improvement in sleep in regards to neck and B UT tightness. - Not met  - Pt to be independent with HEP. - Progressing    Progress strengthening /stabilization /functional activity           Timed:         Manual Therapy:    15     mins  89094;     Therapeutic Exercise:    15     mins  18110;     Neuromuscular Jody:    10    mins  35019;      Un-Timed:  Electrical Stimulation:    15     mins  64010 ( );    Timed Treatment:   40   mins   Total Treatment:     55   mins    Madeline Quinonez PTA  Physical Therapist Assistant License #[id number]

## 2024-03-07 ENCOUNTER — TREATMENT (OUTPATIENT)
Dept: PHYSICAL THERAPY | Facility: CLINIC | Age: 74
End: 2024-03-07
Payer: MEDICARE

## 2024-03-07 DIAGNOSIS — M54.2 PAIN, NECK: ICD-10-CM

## 2024-03-07 DIAGNOSIS — Z98.1 S/P SPINAL FUSION: Primary | ICD-10-CM

## 2024-03-07 NOTE — PROGRESS NOTES
Physical Therapy Daily Treatment Note      Patient: Jim Malik   : 1950  Diagnosis/ICD-10 Code:  S/P spinal fusion [Z98.1]   Problems Addressed this Visit    None  Visit Diagnoses       S/P spinal fusion    -  Primary    Pain, neck              Diagnoses         Codes Comments    S/P spinal fusion    -  Primary ICD-10-CM: Z98.1  ICD-9-CM: V45.4     Pain, neck     ICD-10-CM: M54.2  ICD-9-CM: 723.1           Referring practitioner: DEMOND Rodriguez  Date of Initial Visit: Type: THERAPY  Noted: 2024  Today's Date: 3/7/2024    VISIT#: 11    Subjective : Pt reports having increased neck pain this morning and states he first noticed it during the night. Indicates pain is at top of neck and behind ears. No HA. Denies doing any extra work or anything heavy yesterday. Wants to do dry needling today.    Objective : Dry needling completed this date with written consent to the following areas: B suboccipitals, B cervical paraspinals, B infraspinatus, B scapular fossa, B UT in prone, and anterior aspect B UT and proximal SCM in supine.     Added high rows and B bicep curls.    See Exercise, Manual, and Modality Logs for complete treatment.     Assessment/Plan : increased tension noted  proximal B UT and B SCM. Good response to dry needling. Pt has tolerated strengthening progression over the past couple visits and will benefit from continued B shoulder, scapular, and core strengthening for successful return to work in the near future.     Goals  Plan Goals: STG to be met in 3 wk:  - Pt to report 50% improvement in B UT tightness. -   - Increase B cervical rotation to 45 deg. - MET  - Pt to demonstrated improved posture with min verbal cues. - MET  LTG to be met in 12 wk:  - Improve NDI to 15% or less impairment. - Not met, currently at 24% impairment  - Increase B cervical rotation to 55 deg for improved ease turning his head while driving. - Progressing  - Pt to report 75% improvement in sleep in regards to  neck and B UT tightness. - Not met  - Pt to be independent with HEP. - Progressing    Progress per Plan of Care and Progress strengthening /stabilization /functional activity         Timed:         Manual Therapy:    10     mins  46659;     Therapeutic Exercise:    15     mins  91269;     Neuromuscular Jody:    5    mins  40159;    Therapeutic Activity:          mins  81068;     Gait Training:           mins  05121;     Ultrasound:          mins  53033;    Ionto                                   mins   47202  Self Care                            mins   00179    Un-Timed:  Electrical Stimulation:    15     mins  93523 ( );  Dry Needling     10     mins 20561/20560  Traction          mins 88420  Canalith Repos                   mins  10074  Low Eval          Mins  32566  Mod Eval          Mins  05209  High Eval                            Mins  38200  Re-Eval                               mins  94056    Timed Treatment:   30   mins   Total Treatment:     55   mins    Catrina Jain PT, CLT, Cert DN  Physical Therapist  [place] Lic # [id number]

## 2024-03-15 ENCOUNTER — HOSPITAL ENCOUNTER (OUTPATIENT)
Dept: GENERAL RADIOLOGY | Facility: HOSPITAL | Age: 74
Discharge: HOME | End: 2024-03-15
Payer: MEDICARE

## 2024-03-15 ENCOUNTER — TELEPHONE (OUTPATIENT)
Dept: NEUROSURGERY | Facility: CLINIC | Age: 74
End: 2024-03-15
Payer: MEDICARE

## 2024-03-15 DIAGNOSIS — R13.10 DYSPHAGIA, UNSPECIFIED TYPE: ICD-10-CM

## 2024-03-15 PROCEDURE — 92611 MOTION FLUOROSCOPY/SWALLOW: CPT

## 2024-03-15 PROCEDURE — A9270 NON-COVERED ITEM OR SERVICE: HCPCS | Performed by: NEUROLOGICAL SURGERY

## 2024-03-15 PROCEDURE — 74230 X-RAY XM SWLNG FUNCJ C+: CPT

## 2024-03-15 PROCEDURE — 63710000001 BARIUM SULFATE 40 % SUSPENSION: Performed by: NEUROLOGICAL SURGERY

## 2024-03-15 RX ADMIN — BARIUM SULFATE 50 ML: 400 SUSPENSION ORAL at 10:29

## 2024-03-15 NOTE — TELEPHONE ENCOUNTER
Patient called and needs an updated idea sent to his insurance provider when  he will be released back to work he stated that  has done this before. Claim number [id number]  Sent to Tracey Penaloza   I stated I would put this message in and send it to  and as soon as he says something back I will give an update. Patient stated he understood.

## 2024-03-15 NOTE — MBS/VFSS/FEES
Outpatient Speech Language Pathology   Adult Swallow Initial Evaluation   [facility]     Patient Name: Jim Malik  : 1950  MRN: [id number]  Today's Date: 3/15/2024         Visit Date: 03/15/2024   Patient Active Problem List   Diagnosis    Multinodular goiter    Hyperlipidemia    Asthma    Allergic rhinitis    Basal cell carcinoma, scalp/neck    Primary osteoarthritis involving multiple joints    IPMN (intraductal papillary mucinous neoplasm)    Acquired spondylolisthesis of cervical vertebra        Past Medical History:   Diagnosis Date    Allergic rhinitis     Arthritis     Asthma     DDD (degenerative disc disease), cervical     Fracture     MUTIPLE ARMS FXS, ARMS, FINGERS,TOES,LEGS, AND COLLARBONE ELEVATOR ACCIDENT    Goiter     MULTI-NODULAR, no change for 2 years    History of transfusion     self donated    Hyperlipidemia     Neck pain     Testicular hypofunction         Past Surgical History:   Procedure Laterality Date    ANTERIOR CERVICAL DISCECTOMY W/ FUSION N/A 2023    Procedure: Cervical 2-4 anterior cervical discectomy and fusion;  Surgeon: Jim Lombardo IV, MD;  Location: [facility] MAIN OR;  Service: Neurosurgery;  Laterality: N/A;    CARPAL TUNNEL RELEASE Left     COLONOSCOPY      COLONOSCOPY N/A 10/11/2021    Procedure: COLONOSCOPY;  Surgeon: Radha Leon MD;  Location: [facility] ENDOSCOPY;  Service: Gastroenterology;  Laterality: N/A;    ENDOSCOPY      KNEE ARTHROSCOPY Right     SHOULDER ARTHROSCOPY Right     SKIN LESION EXCISION N/A 2019    Procedure: SKIN LESION EXCISION  Posterior scalp x2 and left lower extremity;  Surgeon: Stephen Zamora MD;  Location: [facility] MAIN OR;  Service: General    SKIN LESION EXCISION Left 07/15/2019    Procedure: SKIN LESION EXCISION;  Surgeon: Stephen Zamora MD;  Location: [facility] MAIN OR;  Service: General    SPINE SURGERY      STABALATIONS    TONSILLECTOMY      UPPER ENDOSCOPIC ULTRASOUND W/ FNA N/A 2021    Procedure: ENDOSCOPIC  ULTRASOUND WITH FINE NEEDLE ASPIRATION OF PANCREATIC CYST;  Surgeon: Jennifer Balderas MD;  Location: [facility] ENDOSCOPY;  Service: Gastroenterology;  Laterality: N/A;  Post: PANCREATIC CYST    UPPER ENDOSCOPIC ULTRASOUND W/ FNA N/A 11/18/2022    Procedure: ENDOSCOPIC ULTRASOUND aspiration of pancreatic cyst;  Surgeon: Jennifer Balderas MD;  Location: [facility] ENDOSCOPY;  Service: Gastroenterology;  Laterality: N/A;  pancreatic cyst         Visit Dx:     ICD-10-CM ICD-9-CM   1. Dysphagia, unspecified type  R13.10 787.20                SLP Adult Swallow Evaluation       Row Name 03/15/24 1100       Rehab Evaluation    Document Type evaluation  -CP    Subjective Information complains of  choking frequently  -CP    Patient Observations alert;cooperative  -CP    Patient Effort good  -CP       General Information    Patient Profile Reviewed yes  -CP    Pertinent History Of Current Problem Pt presents today for VFSS due to complaints of frequent choking during meals since ACDF surgery on 12/28/2023. Pt reports coughing/choking immediately after the surgery while hospitalized but was assured the swallow would improve. he went home and reports that he has continued to have coughing/choking on all PO daily and that he even will cough excessively and a piece of sandwich or whatever food he was eating prior will be coughed into his hand. Pt reports severe choking incident last week on potato chips, to the point of feeling as if he could not breathe. Pt has become fearful of eating. Pt reports no esophageal concerns that he is aware of and reports no history of reflux. He presents today for further evaluation of swallow, risk for aspiration and least restrictive diet.  -CP    Current Method of Nutrition regular textures;thin liquids  -CP    Prior Level of Function-Swallowing no diet consistency restrictions;regular textures;thin liquids;other (see comments)  weight loss  -CP    Plans/Goals Discussed with patient  -CP    Barriers to Rehab  none identified  -CP       Pain    Additional Documentation Pain Scale: FACES Pre/Post-Treatment (Group)  -CP       Pain Scale: FACES Pre/Post-Treatment    Pain: FACES Scale, Pretreatment 0-->no hurt  -CP    Posttreatment Pain Rating 0-->no hurt  -CP       Oral Motor Structure and Function    Dentition Assessment natural, present and adequate  -CP    Secretion Management WNL/WFL  -CP    Mucosal Quality moist, healthy  -CP       Oral Musculature and Cranial Nerve Assessment    Oral Motor General Assessment WFL  -CP       General Eating/Swallowing Observations    Respiratory Support Currently in Use room air  -CP    Eating/Swallowing Skills self-fed;appropriate self-feeding skills observed  -CP    Positioning During Eating other (see comments)  Standing  -CP       MBS/VFSS    Utensils Used spoon;cup  -CP    Consistencies Trialed nectar/syrup-thick liquids;pureed;soft to chew textures;regular textures;thin liquids  -CP       MBS/VFSS Interpretation    VFSS Summary Pt was seen for video swallow study. Pt was seen standing in the lateral position. Pt fed himself trials of NT barium by cup x2,  barium coated applesauce x2, thin barium by cup x1,barium coated peaches x2, barium coated cracker x1, thin barium by cup x2 and an esophageal scan to complete the study. Pt has natural dentition in good condition and mastication was timely and efficient.  Oral transit timely for puree and liquids. Bolus control slightly reduced with mild premature spillage of all trials over the BOT to the valleculae. Swallow was timely.  Pt had adequate laryngeal elevation and anterior hyoid excursion. Epiglottic inversion and laryngeal vestibular closure also adequate. There was no penetration or aspiration of any consistency. There was no residue remaining in the pharyngeal area after the swallow, however pt was observed to have a prominent cricopharyngeous with retention of barium above this. This retention occurred on all consistencies and pt  sensated this retention and used multiple swallows to attempt to clear. A liquid wash and multiple swallows cleared some, but not all of the retained material. There was retrograde flow above the UES into the pharyngeal area from this retained material. This caused pt to cough. Further esophageal scan revealed lower esophageal retention with retrograde flow. Suspect esophageal involvement contributing to pt's complaints.     Pt presents functional oral and pharyngeal phase of swallow.     Esophageal dysphagia should be a consideration based on above results.     It is recommended that pt continue a regular diet with thin liquids as tolerated.     Pt should have a GI consult to address above esophageal concerns.     Pt should be upright at 90 degrees for all PO and remain upright for 30-60 minutes after all PO. Pt should take small single sips of liquid and small bites of food. Pt should eat at a slow rate and avoid dry, sticky foods.  Education was given to pt regarding the full results and recommendations from this study and he was shown images from the [facility]. Pt also given BOT strengthening exercises due to mild premature spillage. Pt expressed understanding of all results and recommendations. Thank you for referring this patient. If you have any questions regarding this study, feel free to contact this department at [phone].  -CP       SLP Evaluation Clinical Impression    SLP Swallowing Diagnosis functional oral phase;functional pharyngeal phase;suspected esophageal dysphagia  -CP    Functional Impact risk of aspiration/pneumonia;risk of malnutrition  -CP       SLP Treatment Clinical Impressions    Care Plan Review evaluation/treatment results reviewed  -CP       Recommendations    SLP Diet Recommendation regular textures;thin liquids  -CP    Recommended Precautions and Strategies upright posture during/after eating;small bites of food and sips of liquid;alternate between small bites of food and sips of  liquid;general aspiration precautions;reflux precautions  -CP    Oral Care Recommendations Oral Care before breakfast, after meals and PRN  -CP    SLP Rec. for Method of Medication Administration meds whole;meds crushed;as tolerated  -CP    Demonstrates Need for Referral to Another Service gastroenterology;dedicated esophageal assessment  -CP              User Key  (r) = Recorded By, (t) = Taken By, (c) = Cosigned By      Initials Name Provider Type    Manda Navas, SLP Speech and Language Pathologist                                               Time Calculation:                     STEVEN Spears  3/15/2024

## 2024-03-19 ENCOUNTER — TREATMENT (OUTPATIENT)
Dept: PHYSICAL THERAPY | Facility: CLINIC | Age: 74
End: 2024-03-19
Payer: MEDICARE

## 2024-03-19 DIAGNOSIS — M54.2 PAIN, NECK: ICD-10-CM

## 2024-03-19 DIAGNOSIS — H81.12 BPPV (BENIGN PAROXYSMAL POSITIONAL VERTIGO), LEFT: ICD-10-CM

## 2024-03-19 DIAGNOSIS — Z98.1 S/P SPINAL FUSION: Primary | ICD-10-CM

## 2024-03-19 PROCEDURE — 97140 MANUAL THERAPY 1/> REGIONS: CPT | Performed by: PHYSICAL THERAPIST

## 2024-03-19 PROCEDURE — G0283 ELEC STIM OTHER THAN WOUND: HCPCS | Performed by: PHYSICAL THERAPIST

## 2024-03-19 PROCEDURE — 97110 THERAPEUTIC EXERCISES: CPT | Performed by: PHYSICAL THERAPIST

## 2024-03-19 NOTE — PROGRESS NOTES
Physical Therapy Daily Treatment Note      Patient: Jim Malik   : 1950  Diagnosis/ICD-10 Code:  S/P spinal fusion [Z98.1]  Referring practitioner: DEMOND Rodriguez  Date of Initial Visit: Type: THERAPY  Noted: 2024  Today's Date: 3/19/2024  Patient seen for 12 sessions         Jim Malik reports: he had a 3.5 hr drive prior to therapy today and his neck and B UT's are very sore and tense from it he states they never like the long drives. Pt. States all the exercise is going well and does not cause pain.    Objective   See Exercise, Manual, and Modality Logs for complete treatment.     Assessment/Plan  Pt. Tolerates treatment well and shows good posture and strength with exercise this date and having no complaints of pain throughout Pt. does present tense in B UT's this day and as STM were performed trigger points were noted along tight musculature overall. Pt. Has relaxation response to STM and stretch followed by Estim this date.    Goals  Plan Goals: STG to be met in 3 wk:  - Pt to report 50% improvement in B UT tightness. -   - Increase B cervical rotation to 45 deg. - MET  - Pt to demonstrated improved posture with min verbal cues. - MET  LTG to be met in 12 wk:  - Improve NDI to 15% or less impairment. - Not met, currently at 24% impairment  - Increase B cervical rotation to 55 deg for improved ease turning his head while driving. - Progressing  - Pt to report 75% improvement in sleep in regards to neck and B UT tightness. - Not met  - Pt to be independent with HEP. - Progressing    Progress strengthening /stabilization /functional activity           Timed:         Manual Therapy:    15     mins  29933;     Therapeutic Exercise:    15     mins  80070;       Un-Timed:  Electrical Stimulation:    15     mins  06344 ( );    Timed Treatment:   30   mins   Total Treatment:     45   mins    Madeline Quinonez PTA  Physical Therapist Assistant License #[id number]

## 2024-03-21 ENCOUNTER — TREATMENT (OUTPATIENT)
Dept: PHYSICAL THERAPY | Facility: CLINIC | Age: 74
End: 2024-03-21
Payer: MEDICARE

## 2024-03-21 DIAGNOSIS — M54.2 PAIN, NECK: ICD-10-CM

## 2024-03-21 DIAGNOSIS — Z98.1 S/P SPINAL FUSION: Primary | ICD-10-CM

## 2024-03-21 NOTE — PROGRESS NOTES
Physical Therapy Daily Treatment Note      Patient: Jim Malik   : 1950  Diagnosis/ICD-10 Code:  S/P spinal fusion [Z98.1]   Problems Addressed this Visit    None  Visit Diagnoses       S/P spinal fusion    -  Primary    Pain, neck              Diagnoses         Codes Comments    S/P spinal fusion    -  Primary ICD-10-CM: Z98.1  ICD-9-CM: V45.4     Pain, neck     ICD-10-CM: M54.2  ICD-9-CM: 723.1           Referring practitioner: DEMOND Rodriguez  Date of Initial Visit: Type: THERAPY  Noted: 2024  Today's Date: 3/21/2024    VISIT#: 13    Subjective : Pt reports he is starting to feel stronger. Wants to do dry needling today.      Objective : added shoulder carries to ther ex.     Dry needling completed this date with written consent to the following areas: B suboccipitals, B cervical paraspinals, B infraspinatus, B scapular fossa, B UT in prone, and anterior aspect B UT and proximal SCM in supine.       See Exercise, Manual, and Modality Logs for complete treatment.     Assessment/Plan : Pt is progressing well with improving strength. Continues to have B UT tightness and pain. Good tolerance to dry needling, manual techniques, and use of modalities with decreased pain post-tx. Pt has tolerated strengthening progression over the past couple visits and will benefit from continued B shoulder, scapular, and core strengthening for successful return to work in the near future.      Goals  Plan Goals: STG to be met in 3 wk:  - Pt to report 50% improvement in B UT tightness. -   - Increase B cervical rotation to 45 deg. - MET  - Pt to demonstrated improved posture with min verbal cues. - MET  LTG to be met in 12 wk:  - Improve NDI to 15% or less impairment. - Not met, currently at 24% impairment  - Increase B cervical rotation to 55 deg for improved ease turning his head while driving. - Progressing  - Pt to report 75% improvement in sleep in regards to neck and B UT tightness. - Not met  - Pt to be  independent with HEP. - Progressing    Progress per Plan of Care and Progress strengthening /stabilization /functional activity         Timed:         Manual Therapy:    15     mins  75951;     Therapeutic Exercise:    10     mins  69310;     Neuromuscular Jody:        mins  00436;    Therapeutic Activity:          mins  18558;     Gait Training:           mins  21641;     Ultrasound:          mins  44454;    Ionto                                   mins   21370  Self Care                            mins   00371    Un-Timed:  Electrical Stimulation:    15     mins  50993 ( );  Dry Needling     15     mins 91234/83037  Traction          mins 51235  Canalith Repos                   mins  08517  Low Eval          Mins  44217  Mod Eval          Mins  14146  High Eval                            Mins  38032  Re-Eval                               mins  00034    Timed Treatment:   25   mins   Total Treatment:     55   mins    Catrina Jain, PT, CLT, Cert DN  Physical Therapist  [place] Lic # [id number]

## 2024-03-29 ENCOUNTER — HOSPITAL ENCOUNTER (EMERGENCY)
Facility: HOSPITAL | Age: 74
Discharge: HOME | End: 2024-03-29
Attending: EMERGENCY MEDICINE
Payer: MEDICARE

## 2024-03-29 VITALS
SYSTOLIC BLOOD PRESSURE: 159 MMHG | HEIGHT: 72 IN | TEMPERATURE: 98 F | OXYGEN SATURATION: 98 % | WEIGHT: 223.55 LBS | RESPIRATION RATE: 19 BRPM | DIASTOLIC BLOOD PRESSURE: 84 MMHG | HEART RATE: 93 BPM | BODY MASS INDEX: 30.28 KG/M2

## 2024-03-29 DIAGNOSIS — L03.317 CELLULITIS OF BUTTOCK: Primary | ICD-10-CM

## 2024-03-29 DIAGNOSIS — L03.311 ABDOMINAL WALL CELLULITIS: ICD-10-CM

## 2024-03-29 DIAGNOSIS — L03.116 CELLULITIS OF LEFT THIGH: ICD-10-CM

## 2024-03-29 PROCEDURE — 87070 CULTURE OTHR SPECIMN AEROBIC: CPT | Performed by: NURSE PRACTITIONER

## 2024-03-29 PROCEDURE — 99283 EMERGENCY DEPT VISIT LOW MDM: CPT

## 2024-03-29 PROCEDURE — 87205 SMEAR GRAM STAIN: CPT | Performed by: NURSE PRACTITIONER

## 2024-03-29 PROCEDURE — 87186 SC STD MICRODIL/AGAR DIL: CPT | Performed by: NURSE PRACTITIONER

## 2024-03-29 PROCEDURE — 87147 CULTURE TYPE IMMUNOLOGIC: CPT | Performed by: NURSE PRACTITIONER

## 2024-03-29 RX ORDER — SULFAMETHOXAZOLE AND TRIMETHOPRIM 800; 160 MG/1; MG/1
1 TABLET ORAL 2 TIMES DAILY
Qty: 14 TABLET | Refills: 0 | Status: SHIPPED | OUTPATIENT
Start: 2024-03-29

## 2024-03-29 RX ORDER — SULFAMETHOXAZOLE AND TRIMETHOPRIM 800; 160 MG/1; MG/1
1 TABLET ORAL ONCE
Status: COMPLETED | OUTPATIENT
Start: 2024-03-29 | End: 2024-03-29

## 2024-03-29 RX ADMIN — SULFAMETHOXAZOLE AND TRIMETHOPRIM 1 TABLET: 800; 160 TABLET ORAL at 17:08

## 2024-03-29 NOTE — DISCHARGE INSTRUCTIONS
Keep the areas clean dry and covered continue to apply warm compresses take antibiotics till gone    See your primary care provider for recheck in 3 to 5 days if not improving or return to the ER if worse    You should get rid of all your razors at home as they are contaminated and could cause repeat infections

## 2024-03-29 NOTE — ED PROVIDER NOTES
Subjective   History of Present Illness  Patient is a 74-year-old gentleman who comes in with multiple boils    He states that he periodically shaves his pubic hair and has developed an abscess on his abdomen as well as his right buttocks and his left thigh.      Review of Systems   Constitutional:  Negative for chills, fatigue and fever.   HENT:  Negative for congestion, tinnitus and trouble swallowing.    Eyes:  Negative for photophobia, discharge and redness.   Respiratory:  Negative for cough and shortness of breath.    Cardiovascular:  Negative for chest pain and palpitations.   Gastrointestinal:  Negative for abdominal pain, diarrhea, nausea and vomiting.   Genitourinary:  Negative for dysuria, frequency and urgency.   Musculoskeletal:  Negative for back pain, joint swelling and myalgias.   Skin:  Positive for color change and wound. Negative for rash.   Neurological:  Negative for dizziness and headaches.   Psychiatric/Behavioral:  Negative for confusion.    All other systems reviewed and are negative.      Past Medical History:   Diagnosis Date    Allergic rhinitis     Arthritis     Asthma     DDD (degenerative disc disease), cervical     Fracture     MUTIPLE ARMS FXS, ARMS, FINGERS,TOES,LEGS, AND COLLARBONE ELEVATOR ACCIDENT    Goiter     MULTI-NODULAR, no change for 2 years    History of transfusion     self donated    Hyperlipidemia     Neck pain     Testicular hypofunction        No Known Allergies    Past Surgical History:   Procedure Laterality Date    ANTERIOR CERVICAL DISCECTOMY W/ FUSION N/A 12/28/2023    Procedure: Cervical 2-4 anterior cervical discectomy and fusion;  Surgeon: Jim Lombardo IV, MD;  Location: [facility] MAIN OR;  Service: Neurosurgery;  Laterality: N/A;    CARPAL TUNNEL RELEASE Left 2010    COLONOSCOPY      COLONOSCOPY N/A 10/11/2021    Procedure: COLONOSCOPY;  Surgeon: Radha Leon MD;  Location: [facility] ENDOSCOPY;  Service: Gastroenterology;  Laterality: N/A;    ENDOSCOPY       KNEE ARTHROSCOPY Right     SHOULDER ARTHROSCOPY Right     SKIN LESION EXCISION N/A 07/08/2019    Procedure: SKIN LESION EXCISION  Posterior scalp x2 and left lower extremity;  Surgeon: Stephen Zamora MD;  Location: [facility] MAIN OR;  Service: General    SKIN LESION EXCISION Left 07/15/2019    Procedure: SKIN LESION EXCISION;  Surgeon: Stephen Zamora MD;  Location: [facility] MAIN OR;  Service: General    SPINE SURGERY  1990    STABALATIONS    TONSILLECTOMY      UPPER ENDOSCOPIC ULTRASOUND W/ FNA N/A 12/30/2021    Procedure: ENDOSCOPIC ULTRASOUND WITH FINE NEEDLE ASPIRATION OF PANCREATIC CYST;  Surgeon: Jennifer Balderas MD;  Location: [facility] ENDOSCOPY;  Service: Gastroenterology;  Laterality: N/A;  Post: PANCREATIC CYST    UPPER ENDOSCOPIC ULTRASOUND W/ FNA N/A 11/18/2022    Procedure: ENDOSCOPIC ULTRASOUND aspiration of pancreatic cyst;  Surgeon: Jennifer Balderas MD;  Location: [facility] ENDOSCOPY;  Service: Gastroenterology;  Laterality: N/A;  pancreatic cyst       Family History   Problem Relation Age of Onset    No Known Problems Mother     Diabetes Brother     Hypertension Brother     Hyperlipidemia Brother        Social History     Socioeconomic History    Marital status:    Tobacco Use    Smoking status: Never    Smokeless tobacco: Never   Vaping Use    Vaping status: Never Used   Substance and Sexual Activity    Alcohol use: Yes     Alcohol/week: 1.0 standard drink of alcohol     Types: 1 Cans of beer per week     Comment: Occ.    Drug use: No    Sexual activity: Defer     Partners: Female           Objective   Physical Exam  Vitals reviewed.   Constitutional:       Appearance: Normal appearance. He is well-developed.   HENT:      Head: Normocephalic and atraumatic.   Eyes:      Conjunctiva/sclera: Conjunctivae normal.      Pupils: Pupils are equal, round, and reactive to light.   Cardiovascular:      Rate and Rhythm: Normal rate and regular rhythm.      Heart sounds: Normal heart sounds.   Pulmonary:       "Breath sounds: Normal breath sounds.   Abdominal:      Palpations: Abdomen is soft.   Musculoskeletal:         General: Normal range of motion.      Cervical back: Normal range of motion and neck supple.   Skin:     General: Skin is warm and dry.          Neurological:      Mental Status: He is alert and oriented to person, place, and time.      GCS: GCS eye subscore is 4. GCS verbal subscore is 5. GCS motor subscore is 6.   Psychiatric:         Mood and Affect: Mood normal.         Behavior: Behavior normal.         Procedures       The left thigh wound had small amount of drainage and wound culture was obtained    ED Course                                 /84   Pulse 93   Temp 98 °F (36.7 °C) (Temporal)   Resp 19   Ht 182.9 cm (72\")   Wt 101 kg (223 lb 8.7 oz)   SpO2 98%   BMI 30.32 kg/m²   Labs Reviewed   WOUND CULTURE     Medications   sulfamethoxazole-trimethoprim (BACTRIM DS,SEPTRA DS) 800-160 MG per tablet 1 tablet (1 tablet Oral Given 3/29/24 1708)     No radiology results for the last day              Medical Decision Making  Patient was started on Bactrim and was advised that the areas are not ready to be opened as there is still induration with no fluctuance on any sites.  He was advised to throw away his razors at home to talk to his primary care provider about the decolonization protocol for MRSA as this is probably the source of his infections today.  And that he should return for any worsening symptoms or if they become soft in the middle as today they could not be opened as they were not ready.  He verbalized understood discharge instruction    Problems Addressed:  Abdominal wall cellulitis: complicated acute illness or injury  Cellulitis of buttock: complicated acute illness or injury  Cellulitis of left thigh: complicated acute illness or injury    Amount and/or Complexity of Data Reviewed  Labs: ordered.    Risk  Prescription drug management.        Final diagnoses:   Cellulitis of " buttock   Abdominal wall cellulitis   Cellulitis of left thigh       ED Disposition  ED Disposition       ED Disposition   Discharge    Condition   Stable    Comment   --               Bob Garcia PA-C  [address] [facility] [address]  [address]  [place] [address]  [phone]    In 3 days  If symptoms worsen, As needed    Fer Mejia MD  [address]  [address]  [place] [address]  [phone]    In 5 days           Medication List        New Prescriptions      sulfamethoxazole-trimethoprim 800-160 MG per tablet  Commonly known as: BACTRIM DS,SEPTRA DS  Take 1 tablet by mouth 2 (Two) Times a Day.            Changed      montelukast 10 MG tablet  Commonly known as: SINGULAIR  TAKE 1 TABLET BY MOUTH EVERY DAY  What changed: when to take this               Where to Get Your Medications        These medications were sent to [facility]/pharmacy #[id number] - [place], [place] - [address] [place] [address] [phone] [place] [phone] [place] [address] [place] [address]      Phone: [phone]   sulfamethoxazole-trimethoprim 800-160 MG per tablet            Josephine Arnold, APRN  03/29/24 3129

## 2024-04-01 LAB
BACTERIA SPEC AEROBE CULT: ABNORMAL
GRAM STN SPEC: ABNORMAL

## 2024-04-01 NOTE — PROGRESS NOTES
Patient wound culture resulted with MRSA. Susceptible to Sulfonamides. Patient was given Rx for sulfamethoxazole/ trimethoprim. Therapy is appropriate coverage. No further follow-up required..    Microbiology Results (last 10 days)       Procedure Component Value - Date/Time    Wound Culture - Drainage, Thigh, Left [[id number]]  (Abnormal)  (Susceptibility) Collected: 03/29/24 1700    Lab Status: Final result Specimen: Drainage from Thigh, Left Updated: 04/01/24 0880     Wound Culture Light growth (2+) Staphylococcus aureus, MRSA     Comment:   Methicillin resistant Staphylococcus aureus, Patient may be an isolation risk.        Gram Stain Few (2+) WBCs per low power field      Few (2+) Gram positive cocci      --     Comment: The previously reported stain No WBCs or organisms seen is no longer being reported.       Susceptibility        Staphylococcus aureus, MRSA      GLENROY      Clindamycin 0.25 ug/ml Susceptible      Erythromycin >=8 ug/ml Resistant      Oxacillin >=4 ug/ml Resistant      Rifampin <=0.5 ug/ml Susceptible      Tetracycline <=1 ug/ml Susceptible      Trimethoprim + Sulfamethoxazole <=10 ug/ml Susceptible      Vancomycin 1 ug/ml Susceptible                                   Linnea Sparks, PharmD  4/1/2024 09:56 EDT

## 2024-05-03 ENCOUNTER — TRANSCRIBE ORDERS (OUTPATIENT)
Dept: ADMINISTRATIVE | Facility: HOSPITAL | Age: 74
End: 2024-05-03
Payer: MEDICARE

## 2024-05-03 DIAGNOSIS — K86.2 PANCREAS CYST: Primary | ICD-10-CM

## 2024-05-23 ENCOUNTER — TELEPHONE (OUTPATIENT)
Dept: NEUROSURGERY | Facility: CLINIC | Age: 74
End: 2024-05-23
Payer: MEDICARE

## 2024-05-23 NOTE — TELEPHONE ENCOUNTER
PROVIDER: DR. DREW    PATIENT: RONN MCRAE    CALL BACK REQUESTED @ [phone]    TO DISCUSS VISIT WE WERE ATTEMPTING TO SCHEDULE EARLIER BEFORE THE CALL WAS ENDED.    CALL BACK ASAP. THANKS.

## 2024-05-24 NOTE — TELEPHONE ENCOUNTER
CALLED, SPOKE WITH PATIENT. INFORMED HIM I HAD FAXED UPDATED REQUEST FORM WITH NEW APPOINTMENT DATE T MICHAEL YESTERDAY. SENT LETTER TODAY TO ALEX ROSE STATING UPDATED APPOINTMENT DATE. HE VERBALIZED UNDERSTANDING.

## 2024-05-28 NOTE — PROGRESS NOTES
"Subjective   History of Present Illness: Jim Malik is a 74 y.o. male is here today for follow-up on sx 12/28/23 cervical 2-4 anterior cervical discectomy and fusion. Today patient reports his headaches has resolved. Patient feels good.  Swallowing issues also resolved    Chief Complaint   Patient presents with    Follow-up          Previous treatment: Physical therapy post-op Hydrocodone, Flexeril,Naproxen,Medrol Dosepack     Previous neurosurgery:  Cervical 2-4 anterior cervical discectomy and fusion on 12/28/23.     Previous injections:     The following portions of the patient's history were reviewed and updated as appropriate: allergies, current medications, past family history, past medical history, past social history, past surgical history, and problem list.    Review of Systems   Constitutional:  Positive for activity change.   Respiratory:  Negative for chest tightness and shortness of breath.    Cardiovascular:  Negative for chest pain.   Musculoskeletal:  Negative for neck pain.   Neurological:  Negative for weakness and headaches.       Objective      /97   Pulse 84   Resp 18   Ht 182.9 cm (72\")   Wt 101 kg (223 lb)   BMI 30.24 kg/m²    Body mass index is 30.24 kg/m².  Vitals:    06/03/24 1601   PainSc: 0-No pain           Neurologic Exam    Assessment & Plan   Independent Review of Radiographic Studies:      I personally reviewed and interpreted the images from the following studies.    No new image    Medical Decision Making:      Jim Malik is a 74 y.o. male status post C2-4 ACDF overall doing well at 6 months postop.  Patient has no limitations from my perspective and can ramp up to normal activity.  He can follow-up as needed      Diagnoses and all orders for this visit:    1. S/P spinal fusion (Primary)      No follow-ups on file.    This patient was examined wearing appropriate personal protective equipment.                      Dr. Jim Lombardo IV    06/03/24  16:24 EDT  "

## 2024-06-03 ENCOUNTER — OFFICE VISIT (OUTPATIENT)
Dept: NEUROSURGERY | Facility: CLINIC | Age: 74
End: 2024-06-03
Payer: MEDICARE

## 2024-06-03 VITALS
WEIGHT: 223 LBS | HEART RATE: 84 BPM | RESPIRATION RATE: 18 BRPM | DIASTOLIC BLOOD PRESSURE: 97 MMHG | HEIGHT: 72 IN | BODY MASS INDEX: 30.2 KG/M2 | SYSTOLIC BLOOD PRESSURE: 149 MMHG

## 2024-06-03 DIAGNOSIS — Z98.1 S/P SPINAL FUSION: Primary | ICD-10-CM

## 2024-06-03 PROCEDURE — 1160F RVW MEDS BY RX/DR IN RCRD: CPT | Performed by: NEUROLOGICAL SURGERY

## 2024-06-03 PROCEDURE — 1159F MED LIST DOCD IN RCRD: CPT | Performed by: NEUROLOGICAL SURGERY

## 2024-06-03 PROCEDURE — 99212 OFFICE O/P EST SF 10 MIN: CPT | Performed by: NEUROLOGICAL SURGERY

## 2024-06-08 RX ORDER — MONTELUKAST SODIUM 10 MG/1
TABLET ORAL
Qty: 30 TABLET | Refills: 5 | OUTPATIENT
Start: 2024-06-08

## 2024-06-19 ENCOUNTER — HOSPITAL ENCOUNTER (OUTPATIENT)
Dept: CT IMAGING | Facility: HOSPITAL | Age: 74
Discharge: HOME | End: 2024-06-19
Admitting: INTERNAL MEDICINE
Payer: MEDICARE

## 2024-06-19 DIAGNOSIS — K86.2 PANCREAS CYST: ICD-10-CM

## 2024-06-19 PROCEDURE — 25510000001 IOPAMIDOL PER 1 ML: Performed by: INTERNAL MEDICINE

## 2024-06-19 PROCEDURE — 74170 CT ABD WO CNTRST FLWD CNTRST: CPT

## 2024-06-19 RX ADMIN — IOPAMIDOL 100 ML: 755 INJECTION, SOLUTION INTRAVENOUS at 15:44

## 2025-01-07 ENCOUNTER — DOCUMENTATION (OUTPATIENT)
Dept: PHYSICAL THERAPY | Facility: CLINIC | Age: 75
End: 2025-01-07
Payer: MEDICARE

## 2025-01-07 NOTE — PROGRESS NOTES
Discharge Summary  Discharge Summary from Physical Therapy Report      Dates  PT visit:  1/22/2024 - 3/21/2024  Number of Visits: 13     Discharge Status of Patient: See Progress Note dated 3/21/2024    Goals: Partially Met    Discharge Plan: Continue with current home exercise program as instructed    Comments : Pt made steady gains with PT. He responded well to dry needling to address muscle tension. Last few visits were cancelled with last one being due to having the flu. Pt did not return to PT.    Date of Discharge 1/7/2025        Catrina Jain, PT  Physical Therapist  [place] Lic# [id number]

## 2025-01-10 ENCOUNTER — OFFICE (OUTPATIENT)
Dept: URBAN - METROPOLITAN AREA CLINIC 64 | Facility: CLINIC | Age: 75
End: 2025-01-10
Payer: MEDICARE

## 2025-01-10 VITALS
HEART RATE: 79 BPM | SYSTOLIC BLOOD PRESSURE: 163 MMHG | WEIGHT: 239 LBS | HEIGHT: 72 IN | DIASTOLIC BLOOD PRESSURE: 95 MMHG

## 2025-01-10 DIAGNOSIS — K86.2 CYST OF PANCREAS: ICD-10-CM

## 2025-01-10 PROCEDURE — 99213 OFFICE O/P EST LOW 20 MIN: CPT | Performed by: NURSE PRACTITIONER

## 2025-02-12 ENCOUNTER — OFFICE VISIT (OUTPATIENT)
Dept: FAMILY MEDICINE CLINIC | Facility: CLINIC | Age: 75
End: 2025-02-12
Payer: MEDICARE

## 2025-02-12 VITALS
RESPIRATION RATE: 18 BRPM | DIASTOLIC BLOOD PRESSURE: 86 MMHG | OXYGEN SATURATION: 95 % | SYSTOLIC BLOOD PRESSURE: 136 MMHG | WEIGHT: 238.6 LBS | HEART RATE: 83 BPM | TEMPERATURE: 98.4 F | BODY MASS INDEX: 32.32 KG/M2 | HEIGHT: 72 IN

## 2025-02-12 DIAGNOSIS — R03.0 ELEVATED BLOOD PRESSURE READING: ICD-10-CM

## 2025-02-12 DIAGNOSIS — I70.0 ATHEROSCLEROSIS OF AORTA: ICD-10-CM

## 2025-02-12 DIAGNOSIS — Z23 INFLUENZA VACCINE NEEDED: ICD-10-CM

## 2025-02-12 DIAGNOSIS — J45.20 MILD INTERMITTENT ASTHMA WITHOUT COMPLICATION: Primary | Chronic | ICD-10-CM

## 2025-02-12 DIAGNOSIS — E78.5 HYPERLIPIDEMIA, UNSPECIFIED HYPERLIPIDEMIA TYPE: Chronic | ICD-10-CM

## 2025-02-12 DIAGNOSIS — J30.89 NON-SEASONAL ALLERGIC RHINITIS DUE TO FUNGAL SPORES: Chronic | ICD-10-CM

## 2025-02-12 DIAGNOSIS — Z23 NEED FOR COVID-19 VACCINE: ICD-10-CM

## 2025-02-12 DIAGNOSIS — E04.2 MULTINODULAR GOITER: Chronic | ICD-10-CM

## 2025-02-12 DIAGNOSIS — Z13.1 SCREENING FOR DIABETES MELLITUS: ICD-10-CM

## 2025-02-12 DIAGNOSIS — Z12.5 SCREENING FOR PROSTATE CANCER: ICD-10-CM

## 2025-02-12 DIAGNOSIS — E66.09 CLASS 1 OBESITY DUE TO EXCESS CALORIES WITH SERIOUS COMORBIDITY AND BODY MASS INDEX (BMI) OF 32.0 TO 32.9 IN ADULT: ICD-10-CM

## 2025-02-12 DIAGNOSIS — K86.2 PANCREATIC CYST: ICD-10-CM

## 2025-02-12 DIAGNOSIS — G89.29 CHRONIC BILATERAL LOW BACK PAIN WITHOUT SCIATICA: ICD-10-CM

## 2025-02-12 DIAGNOSIS — M54.50 CHRONIC BILATERAL LOW BACK PAIN WITHOUT SCIATICA: ICD-10-CM

## 2025-02-12 DIAGNOSIS — E66.811 CLASS 1 OBESITY DUE TO EXCESS CALORIES WITH SERIOUS COMORBIDITY AND BODY MASS INDEX (BMI) OF 32.0 TO 32.9 IN ADULT: ICD-10-CM

## 2025-02-12 PROCEDURE — 99214 OFFICE O/P EST MOD 30 MIN: CPT | Performed by: NURSE PRACTITIONER

## 2025-02-12 PROCEDURE — 1160F RVW MEDS BY RX/DR IN RCRD: CPT | Performed by: NURSE PRACTITIONER

## 2025-02-12 PROCEDURE — G0008 ADMIN INFLUENZA VIRUS VAC: HCPCS | Performed by: NURSE PRACTITIONER

## 2025-02-12 PROCEDURE — 1126F AMNT PAIN NOTED NONE PRSNT: CPT | Performed by: NURSE PRACTITIONER

## 2025-02-12 PROCEDURE — 1159F MED LIST DOCD IN RCRD: CPT | Performed by: NURSE PRACTITIONER

## 2025-02-12 PROCEDURE — 90480 ADMN SARSCOV2 VAC 1/ONLY CMP: CPT | Performed by: NURSE PRACTITIONER

## 2025-02-12 PROCEDURE — 91320 SARSCV2 VAC 30MCG TRS-SUC IM: CPT | Performed by: NURSE PRACTITIONER

## 2025-02-12 PROCEDURE — 90662 IIV NO PRSV INCREASED AG IM: CPT | Performed by: NURSE PRACTITIONER

## 2025-02-12 RX ORDER — CYCLOBENZAPRINE HCL 10 MG
10 TABLET ORAL 3 TIMES DAILY PRN
Qty: 60 TABLET | Refills: 0 | Status: SHIPPED | OUTPATIENT
Start: 2025-02-12

## 2025-02-12 RX ORDER — SODIUM PHOSPHATE,MONO-DIBASIC 19G-7G/118
1 ENEMA (ML) RECTAL
COMMUNITY

## 2025-02-12 RX ORDER — ALBUTEROL SULFATE 90 UG/1
2 INHALANT RESPIRATORY (INHALATION) EVERY 4 HOURS PRN
Qty: 8 G | Refills: 5 | Status: SHIPPED | OUTPATIENT
Start: 2025-02-12

## 2025-02-12 RX ORDER — BUDESONIDE AND FORMOTEROL FUMARATE DIHYDRATE 160; 4.5 UG/1; UG/1
2 AEROSOL RESPIRATORY (INHALATION) 2 TIMES DAILY
Qty: 90 EACH | Refills: 3 | Status: SHIPPED | OUTPATIENT
Start: 2025-02-12

## 2025-02-12 RX ORDER — MONTELUKAST SODIUM 10 MG/1
10 TABLET ORAL NIGHTLY
Qty: 90 TABLET | Refills: 1 | Status: SHIPPED | OUTPATIENT
Start: 2025-02-12

## 2025-02-12 RX ORDER — ROSUVASTATIN CALCIUM 10 MG/1
10 TABLET, COATED ORAL DAILY
Qty: 90 TABLET | Refills: 1 | Status: SHIPPED | OUTPATIENT
Start: 2025-02-12

## 2025-02-12 NOTE — PATIENT INSTRUCTIONS
Call office for lab results if you have not received a call from our office or [employer] message in 1-2 days.    Continue current medications and treatment.   Please take blood pressure 2-3 times per week, keep record, bring with you to follow up. Call clinic if systolic bp consistently >140 or diastolic consistently >90.  Call clinic if you decide you would like vascular screening bundle/cardiac calcium score.

## 2025-02-12 NOTE — PROGRESS NOTES
Subjective        Jim Malik is a 75 y.o. male who presents to [facility].     Chief Complaint   Patient presents with    Med Refill    Establish Care       History of Present Illness    Patient presents for evaluation of allergies/asthma.  He wishes to establish care with new provider, hasn't seen pcp since Dr. Mehta retired.  This is my first time evaluating patient.     Asthma/environmental/seasonal alleriges - stable - not currently followed by pulmonology, was seen in remote past.  Taking symbicort 160-4.5 2 puffs twice daily and singulair 10mg at night.  He states mid/late winter into spring is when he has symptoms.  Cold air exacerbates symptoms.  No recent wheezing, denies chronic cough. He reports having pulmonary function tests in past but not recently.   Pancreatic cyst -  followed by GI Dr. Balderas.  EUS previously showed small cystic lesion 4mm in body, 2.2 cm oval cystic lesion at uncinate process of pancreas.  Per Dr. Balderas's notes, most recent ct 6/24 showed resolution of cyst.  Denies nausea, vomiting, abdominal pain, fatty stools, diarrhea, weight loss.   Hyperlipidemia - took crestor in past, not on meds.  No recent labs. CT abd/pelvis 6/24 showed atherosclerotic vascular calcifications in the abdominal aorta extending into the iliac arteries and visceral arteries.   Elevated blood pressure - states usually elevated in healthcare settings, is not usually high at home.  Denies chest pain, ankle swelling, dizziness, shortness of breath.    Chronic low back pain - stable - denies incontinence of bowel or bladder.  Takes flexeril 10mg tid prn, needs refill.  Goiter - Per chart notes, previously had goiter.  No recent thyroid labs, I do not see record of thyroid imaging in chart at this time.  He denies dysphagia.     10/21 colonoscopy Dr. Leon - diverticulosis, hyperplastic colon polyps.    The following portions of the patient's history were reviewed and updated as  "appropriate: allergies, current medications, past family history, past medical history, past social history, past surgical history and problem list.    No Known Allergies       Current Outpatient Medications:     albuterol sulfate HFA (Proventil HFA) 108 (90 Base) MCG/ACT inhaler, Inhale 2 puffs Every 4 (Four) Hours As Needed for Wheezing., Disp: 8 g, Rfl: 5    budesonide-formoterol (Symbicort) 160-4.5 MCG/ACT inhaler, Inhale 2 puffs 2 (Two) Times a Day., Disp: 90 each, Rfl: 3    cyclobenzaprine (FLEXERIL) 10 MG tablet, Take 1 tablet by mouth 3 (Three) Times a Day As Needed for Muscle Spasms., Disp: 60 tablet, Rfl: 0    glucosamine-chondroitin 500-400 MG capsule capsule, Take 1 capsule by mouth 3 (Three) Times a Day With Meals., Disp: , Rfl:     montelukast (SINGULAIR) 10 MG tablet, Take 1 tablet by mouth Every Night., Disp: 90 tablet, Rfl: 1    rosuvastatin (Crestor) 10 MG tablet, Take 1 tablet by mouth Daily., Disp: 90 tablet, Rfl: 1    Review of Systems     Objective     /86 (BP Location: Left arm, Patient Position: Sitting) Comment: manual  Pulse 83   Temp 98.4 °F (36.9 °C) (Oral)   Resp 18   Ht 182.9 cm (72.01\")   Wt 108 kg (238 lb 9.6 oz)   SpO2 95%   BMI 32.35 kg/m²   BMI is >= 30 and <35. (Class 1 Obesity). The following options were offered after discussion;: information on healthy weight added to patient's after visit summary      Physical Exam  Vitals and nursing note reviewed.   Constitutional:       General: He is not in acute distress.     Appearance: Normal appearance. He is obese. He is not ill-appearing or diaphoretic.   HENT:      Head: Normocephalic and atraumatic.      Right Ear: Tympanic membrane, ear canal and external ear normal.      Left Ear: Tympanic membrane, ear canal and external ear normal.      Nose: Nose normal.      Mouth/Throat:      Mouth: Mucous membranes are moist.   Eyes:      General: No scleral icterus.     Conjunctiva/sclera: Conjunctivae normal.      Pupils: " Pupils are equal, round, and reactive to light.   Neck:      Thyroid: No thyroid mass, thyromegaly or thyroid tenderness.   Cardiovascular:      Rate and Rhythm: Normal rate and regular rhythm.      Pulses: Normal pulses.      Heart sounds: Normal heart sounds.   Pulmonary:      Effort: Pulmonary effort is normal. No respiratory distress.      Breath sounds: Normal breath sounds. No wheezing.   Abdominal:      General: Bowel sounds are normal. There is no distension.      Palpations: Abdomen is soft. There is no mass.      Tenderness: There is no abdominal tenderness. There is no guarding.   Musculoskeletal:      Cervical back: Normal range of motion and neck supple. No rigidity.      Right lower leg: No edema.      Left lower leg: No edema.   Skin:     General: Skin is warm and dry.      Capillary Refill: Capillary refill takes less than 2 seconds.      Coloration: Skin is not jaundiced.      Findings: No bruising.   Neurological:      Mental Status: He is alert and oriented to person, place, and time.      Gait: Gait normal.   Psychiatric:         Mood and Affect: Mood normal.         Behavior: Behavior normal.         Thought Content: Thought content normal.         Judgment: Judgment normal.         PHQ-2 Depression Screening  Little interest or pleasure in doing things? Not at all   Feeling down, depressed, or hopeless? Not at all   PHQ-2 Total Score 0         Result Review    The following data was reviewed by: JOEY Perez on 02/12/2025:    Data reviewed : Radiologic studies      CT ABDOMEN W WO CONTRAST     Date of Exam: 6/19/2024 2:41 PM EDT     Indication: Pancreatic cyst, follow-up.     Comparison: 3/1/2022.     Technique: Axial CT images were obtained of the abdomen before and after the uneventful intravenous administration of iodinated contrast. Sagittal and coronal reconstructions were performed.  Automated exposure control and iterative reconstruction   methods were used.         Findings:  There is been interval resolution of the low-density cystic lesion in the uncinate process. There is fatty atrophy of the pancreas. The gallbladder, liver, spleen, and adrenal glands are normal. There are bilateral renal cortical cysts. There are no   enlarged lymph nodes. There are atherosclerotic vascular calcifications in the abdominal aorta extending into the iliac arteries and visceral arteries. There are no dilated loops of bowel to indicate an obstructive process. There is no abnormal bowel   wall thickening. The appendix was not included in the field-of-view. There is mild subsegmental atelectasis in the lower lobes. There are no suspicious osteolytic or sclerotic lesions within the bony structures. There is a grade 1 anterior   spondylolisthesis of L4 on L5. There are underlying degenerative changes.     IMPRESSION:  Impression:     1. Interval resolution of the low-density cystic lesion in the uncinate process of the pancreas. There is fatty atrophy of the pancreas.  2. Bilateral renal cortical cysts.  3. Additional incidental findings as noted above.           Electronically Signed: Garo Duran MD    6/20/2024 4:56 PM EDT   MRI LUMBAR SPINE W WO CONTRAST-     Date of Exam: 10/28/2021 7:10 AM     Indication: lumbar radiculopathy; M89.49-Other hypertrophic  osteoarthropathy, multiple sites; M54.16-Radiculopathy, lumbar region.   Pain in both legs and low back. Back surgery 30 years ago.     Comparison: Lumbar spine radiograph 8/29/2021, lumbar spine MRI 1/9/2019     Technique: Multiplanar multisequence images of the lumbar spine were  performed prior to and after uneventful administration of 20 ml Prohance  IV contrast according to routine lumbar spine MRI with and without  contrast protocol.     FINDINGS:   Bone marrow signal intensity is normal. Degenerative endplate signal  changes at the L2-L3 level. Disc desiccation throughout the lumbar  spine. There is retrolisthesis L2 on L3 measuring 7  mm. There is  anterolisthesis L4 on L5 measuring 1.2 cm. These alignment abnormalities  have progressed since previous study. There is an unchanged low T1 and  low T2 signal intensity round focus of the superior endplate of T11.  Conus medullaris terminates at the L1 level. Conus size and signal  intensity are within normal. There is severe paraspinal muscle atrophy  from L3 and middle lobe. There are postoperative changes of the spinous  processes that appear nonacute. There is no pathologic enhancement  identified.     T12-L1: No significant central canal or neural foraminal narrowing. Mild  facet degeneration.         L1-L2: Mild diffuse broad-based disc bulge. Moderate facet degenerative  change. Mild ligament flavum hypertrophy. Moderate left and mild right  osseous neural foraminal narrowing.     L2-L3: Mild disc osteophyte complex. Moderate facet degenerative change  and ligamentum flavum hypertrophy. Mild central narrowing. Moderate  right osseous neural foraminal narrowing mild left osseous neural  foraminal narrowing.     L3-L4: Mild diffuse broad-based disc bulge. Severe facet degenerative  change with ligamentum flavum hypertrophy and fluid within the facet  joints. Mild central narrowing. Severe right neural foraminal narrowing  largely due to facet and osseous findings.     L4-L5: Anterolisthesis. Mild diffuse broad-based disc bulge. Severe  facet degenerative change. Pars defects. Moderate central narrowing  largely due to alignment abnormality. Severe right and moderate left  neural foraminal narrowing.     L5-S1: No significant central canal or neural foraminal narrowing.  No  significant facet degeneration.         IMPRESSION:  1.Bilateral pars defects causing grade 2 anterolisthesis L4 on L5. This  appears slightly progressed from previous study.  2.There is moderate degenerative disc disease with marginal osteophytes  and severe facet degenerative change in lumbar spine. This contributes  to  central canal or neural foraminal narrowing. There is moderate  central canal narrowing at the L4-L5 level. There is mild central canal  narrowing at other levels. There is neural foraminal narrowing at  multiple levels. It is most severe on the right at L3-L4 and L4-L5.  Please see findings for other details.  3.No pathologic enhancement identified.        Electronically Signed By-Trinidad Pena MD On:10/28/2021 9:07 AM  This report was finalized on [id number] by  Trinidad Pena MD.      Assessment & Plan    Diagnoses and all orders for this visit:    1. Mild intermittent asthma without complication (Primary)  -     CBC & Differential; Future  -     albuterol sulfate HFA (Proventil HFA) 108 (90 Base) MCG/ACT inhaler; Inhale 2 puffs Every 4 (Four) Hours As Needed for Wheezing.  Dispense: 8 g; Refill: 5  -     budesonide-formoterol (Symbicort) 160-4.5 MCG/ACT inhaler; Inhale 2 puffs 2 (Two) Times a Day.  Dispense: 90 each; Refill: 3  -     montelukast (SINGULAIR) 10 MG tablet; Take 1 tablet by mouth Every Night.  Dispense: 90 tablet; Refill: 1    2. Non-seasonal allergic rhinitis due to fungal spores  -     albuterol sulfate HFA (Proventil HFA) 108 (90 Base) MCG/ACT inhaler; Inhale 2 puffs Every 4 (Four) Hours As Needed for Wheezing.  Dispense: 8 g; Refill: 5  -     budesonide-formoterol (Symbicort) 160-4.5 MCG/ACT inhaler; Inhale 2 puffs 2 (Two) Times a Day.  Dispense: 90 each; Refill: 3  -     montelukast (SINGULAIR) 10 MG tablet; Take 1 tablet by mouth Every Night.  Dispense: 90 tablet; Refill: 1    3. Pancreatic cyst  -     CBC & Differential; Future    4. Hyperlipidemia, unspecified hyperlipidemia type  -     Comprehensive Metabolic Panel; Future  -     Lipid Panel; Future  -     rosuvastatin (Crestor) 10 MG tablet; Take 1 tablet by mouth Daily.  Dispense: 90 tablet; Refill: 1    5. Atherosclerosis of aorta  -     rosuvastatin (Crestor) 10 MG tablet; Take 1 tablet by mouth Daily.  Dispense: 90 tablet; Refill:  1    6. Multinodular goiter  -     TSH Rfx On Abnormal To Free T4; Future    7. Class 1 obesity due to excess calories with serious comorbidity and body mass index (BMI) of 32.0 to 32.9 in adult    8. Chronic bilateral low back pain without sciatica  -     cyclobenzaprine (FLEXERIL) 10 MG tablet; Take 1 tablet by mouth 3 (Three) Times a Day As Needed for Muscle Spasms.  Dispense: 60 tablet; Refill: 0    9. Elevated blood pressure reading  -     Comprehensive Metabolic Panel; Future  -     TSH Rfx On Abnormal To Free T4; Future    10. Screening for prostate cancer  -     PSA Screen; Future    11. Screening for diabetes mellitus  -     Hemoglobin A1c; Future    12. Need for COVID-19 vaccine  -     COVID-19 (Pfizer) 12yrs+ (COMIRNATY)    13. Influenza vaccine needed  -     Fluzone High-Dose 65+yrs ([id number])       Patient Instructions   Call office for lab results if you have not received a call from our office or [employer] message in 1-2 days.    Continue current medications and treatment.   Please take blood pressure 2-3 times per week, keep record, bring with you to follow up. Call clinic if systolic bp consistently >140 or diastolic consistently >90.  Call clinic if you decide you would like vascular screening bundle/cardiac calcium score.  Pt currently declines vascular screening.   Needs pneumococcal vaccine, recommend return to clinic in 1 month for vaccine or can go to pharmacy.       Follow Up   Return in about 6 months (around 8/12/2025) for Annual physical, Recheck, Hyperlipidemia, asthma , With labs 1 week prior to visit.    Patient was given instructions and counseling regarding his condition or for health maintenance advice. Please see specific information pulled into the AVS if appropriate.     Angelia Oden, APRN     02/12/25

## 2025-07-08 ENCOUNTER — TELEPHONE (OUTPATIENT)
Dept: FAMILY MEDICINE CLINIC | Facility: CLINIC | Age: 75
End: 2025-07-08
Payer: MEDICARE

## 2025-07-08 NOTE — TELEPHONE ENCOUNTER
Hub ok to relay   Left voicemail for patient to complete labs work that Angelia ordered then call office for results.

## 2025-08-13 ENCOUNTER — OFFICE VISIT (OUTPATIENT)
Dept: FAMILY MEDICINE CLINIC | Facility: CLINIC | Age: 75
End: 2025-08-13
Payer: MEDICARE

## 2025-08-13 ENCOUNTER — PATIENT ROUNDING (BHMG ONLY) (OUTPATIENT)
Dept: FAMILY MEDICINE CLINIC | Facility: CLINIC | Age: 75
End: 2025-08-13
Payer: MEDICARE

## 2025-08-13 VITALS
RESPIRATION RATE: 17 BRPM | SYSTOLIC BLOOD PRESSURE: 154 MMHG | HEART RATE: 81 BPM | HEIGHT: 72 IN | WEIGHT: 237 LBS | TEMPERATURE: 97.6 F | DIASTOLIC BLOOD PRESSURE: 88 MMHG | BODY MASS INDEX: 32.1 KG/M2 | OXYGEN SATURATION: 97 %

## 2025-08-13 DIAGNOSIS — G89.29 CHRONIC BILATERAL LOW BACK PAIN WITHOUT SCIATICA: ICD-10-CM

## 2025-08-13 DIAGNOSIS — J30.89 ENVIRONMENTAL AND SEASONAL ALLERGIES: ICD-10-CM

## 2025-08-13 DIAGNOSIS — M54.50 CHRONIC BILATERAL LOW BACK PAIN WITHOUT SCIATICA: ICD-10-CM

## 2025-08-13 DIAGNOSIS — E78.00 PURE HYPERCHOLESTEROLEMIA: ICD-10-CM

## 2025-08-13 DIAGNOSIS — Z98.890 HISTORY OF NECK SURGERY: ICD-10-CM

## 2025-08-13 DIAGNOSIS — K86.2 PANCREATIC CYST: ICD-10-CM

## 2025-08-13 DIAGNOSIS — Z23 NEED FOR PNEUMOCOCCAL VACCINE: ICD-10-CM

## 2025-08-13 DIAGNOSIS — R03.0 ELEVATED BLOOD PRESSURE READING: ICD-10-CM

## 2025-08-13 DIAGNOSIS — J45.20 MILD INTERMITTENT ASTHMA WITHOUT COMPLICATION: Chronic | ICD-10-CM

## 2025-08-13 DIAGNOSIS — Z00.00 ENCOUNTER FOR SUBSEQUENT ANNUAL WELLNESS VISIT IN MEDICARE PATIENT: Primary | ICD-10-CM

## (undated) DEVICE — SHEET, DRAPE, SPLIT, STERILE: Brand: MEDLINE

## (undated) DEVICE — TUBING, SUCTION, 1/4" X 12', STRAIGHT: Brand: MEDLINE

## (undated) DEVICE — GLV SURG BIOGEL LTX PF 7

## (undated) DEVICE — GOWN,REINFORCE,POLY,SIRUS,BREATH SLV,XLG: Brand: MEDLINE

## (undated) DEVICE — UNDERGLV SURG BIOGEL INDICATOR LTX PF 7

## (undated) DEVICE — 3.0MM PRECISION NEURO (MATCH HEAD)

## (undated) DEVICE — PK PROC TURNOVER

## (undated) DEVICE — DRP C/ARMOR

## (undated) DEVICE — SLV SCD CALF HEMOFORCE DVT THERP REPROC MD

## (undated) DEVICE — SUT SILK 2/0 FS BLK 18IN 685G

## (undated) DEVICE — BITEBLOCK ENDO W/STRAP 60F A/ LF DISP

## (undated) DEVICE — DEV NDL BIOP EXPECT ENDO ULTRASND SLIM/LN/HNDL 22G

## (undated) DEVICE — SOL IRRIG H2O 1000ML STRL

## (undated) DEVICE — DRSNG WND BORDR/ADHS NONADHR/GZ LF 4X4IN STRL

## (undated) DEVICE — SMOKE EVACUATION TUBING WITH 7/8 IN TO 1/4 IN REDUCER: Brand: BUFFALO FILTER

## (undated) DEVICE — TRAP WIDEEYE POLYP

## (undated) DEVICE — DRAPE,INSTRUMENT,MAGNETIC,10X16: Brand: MEDLINE

## (undated) DEVICE — SOLUTION,WATER,IRRIGATION,1000ML,STERILE: Brand: MEDLINE

## (undated) DEVICE — SOL IRRIG NACL 1000ML

## (undated) DEVICE — ELECTRD BLD EZ CLN MOD 4IN

## (undated) DEVICE — DISTRACT SCRW 12MM STRL

## (undated) DEVICE — DECANTER: Brand: UNBRANDED

## (undated) DEVICE — ANTIBACTERIAL UNDYED BRAIDED (POLYGLACTIN 910), SYNTHETIC ABSORBABLE SUTURE: Brand: COATED VICRYL

## (undated) DEVICE — UNDYED BRAIDED (POLYGLACTIN 910), SYNTHETIC ABSORBABLE SUTURE: Brand: COATED VICRYL

## (undated) DEVICE — PAPR PRNT PK SONY W RIBN UPC55

## (undated) DEVICE — SPONGE,NEURO,1"X1",XR,STRL,LF,10/PK: Brand: MEDLINE

## (undated) DEVICE — DRP OPMI 326071

## (undated) DEVICE — UNDERGLV SURG BIOGEL/PI PF SYNTH SURG SZ8.5 BLU 50/BX

## (undated) DEVICE — SNAR POLYP HOTSNARE/BRAIDED OVL/MINI 7F 2.8X10MM 230CM 1P/U

## (undated) DEVICE — PK BASIC SPINE 50

## (undated) DEVICE — GLV SURG BIOGEL LTX PF 8

## (undated) DEVICE — PK ENDO GI 50

## (undated) DEVICE — PROXIMATE RH ROTATING HEAD SKIN STAPLERS (35 WIDE) CONTAINS 35 STAINLESS STEEL STAPLES: Brand: PROXIMATE

## (undated) DEVICE — HEAD SUPPORT 1358225 BIOFLEK SURGICAL: Brand: BIO-FLEK

## (undated) DEVICE — SUT MNCRYL 4/0 PS2 27IN UD MCP426H

## (undated) DEVICE — KT SURG TURNOVER 050

## (undated) DEVICE — DRAPE SHEET ULTRAGARD: Brand: MEDLINE